# Patient Record
Sex: MALE | Race: WHITE | NOT HISPANIC OR LATINO | Employment: FULL TIME | URBAN - METROPOLITAN AREA
[De-identification: names, ages, dates, MRNs, and addresses within clinical notes are randomized per-mention and may not be internally consistent; named-entity substitution may affect disease eponyms.]

---

## 2015-03-17 LAB — CRC RECOMMENDATION EXT: NORMAL

## 2017-03-23 ENCOUNTER — HISTORICAL (OUTPATIENT)
Dept: ADMINISTRATIVE | Facility: HOSPITAL | Age: 67
End: 2017-03-23

## 2017-07-06 ENCOUNTER — HISTORICAL (OUTPATIENT)
Dept: RADIOLOGY | Facility: HOSPITAL | Age: 67
End: 2017-07-06

## 2017-07-06 LAB
ABS NEUT (OLG): 4.13 X10(3)/MCL (ref 2.1–9.2)
ALBUMIN SERPL-MCNC: 3.8 GM/DL (ref 3.4–5)
ALBUMIN/GLOB SERPL: 1.3 RATIO (ref 1.1–2)
ALP SERPL-CCNC: 29 UNIT/L (ref 50–136)
ALT SERPL-CCNC: 30 UNIT/L (ref 12–78)
AMYLASE SERPL-CCNC: 42 UNIT/L (ref 25–115)
AST SERPL-CCNC: 22 UNIT/L (ref 15–37)
BASOPHILS # BLD AUTO: 0 X10(3)/MCL (ref 0–0.2)
BASOPHILS NFR BLD AUTO: 0 %
BILIRUB SERPL-MCNC: 0.4 MG/DL (ref 0.2–1)
BILIRUBIN DIRECT+TOT PNL SERPL-MCNC: 0.1 MG/DL (ref 0–0.5)
BILIRUBIN DIRECT+TOT PNL SERPL-MCNC: 0.3 MG/DL (ref 0–0.8)
BUN SERPL-MCNC: 19 MG/DL (ref 7–18)
CALCIUM SERPL-MCNC: 9.2 MG/DL (ref 8.5–10.1)
CHLORIDE SERPL-SCNC: 105 MMOL/L (ref 98–107)
CO2 SERPL-SCNC: 29 MMOL/L (ref 21–32)
CREAT SERPL-MCNC: 0.84 MG/DL (ref 0.7–1.3)
EOSINOPHIL # BLD AUTO: 0.3 X10(3)/MCL (ref 0–0.9)
EOSINOPHIL NFR BLD AUTO: 4 %
ERYTHROCYTE [DISTWIDTH] IN BLOOD BY AUTOMATED COUNT: 13.2 % (ref 11.5–17)
GLOBULIN SER-MCNC: 3 GM/DL (ref 2.4–3.5)
GLUCOSE SERPL-MCNC: 122 MG/DL (ref 74–106)
HCT VFR BLD AUTO: 39.3 % (ref 42–52)
HGB BLD-MCNC: 12.8 GM/DL (ref 14–18)
LIPASE SERPL-CCNC: 159 UNIT/L (ref 73–393)
LYMPHOCYTES # BLD AUTO: 1.4 X10(3)/MCL (ref 0.6–4.6)
LYMPHOCYTES NFR BLD AUTO: 22 %
MCH RBC QN AUTO: 29.3 PG (ref 27–31)
MCHC RBC AUTO-ENTMCNC: 32.6 GM/DL (ref 33–36)
MCV RBC AUTO: 89.9 FL (ref 80–94)
MONOCYTES # BLD AUTO: 0.4 X10(3)/MCL (ref 0.1–1.3)
MONOCYTES NFR BLD AUTO: 6 %
NEUTROPHILS # BLD AUTO: 4.13 X10(3)/MCL (ref 1.4–7.9)
NEUTROPHILS NFR BLD AUTO: 66 %
PLATELET # BLD AUTO: 162 X10(3)/MCL (ref 130–400)
PMV BLD AUTO: 9.6 FL (ref 9.4–12.4)
POTASSIUM SERPL-SCNC: 4.4 MMOL/L (ref 3.5–5.1)
PROT SERPL-MCNC: 6.8 GM/DL (ref 6.4–8.2)
RBC # BLD AUTO: 4.37 X10(6)/MCL (ref 4.7–6.1)
SODIUM SERPL-SCNC: 142 MMOL/L (ref 136–145)
WBC # SPEC AUTO: 6.2 X10(3)/MCL (ref 4.5–11.5)

## 2017-07-20 ENCOUNTER — HISTORICAL (OUTPATIENT)
Dept: ADMINISTRATIVE | Facility: HOSPITAL | Age: 67
End: 2017-07-20

## 2017-07-20 LAB
APPEARANCE, UA: CLEAR
BACTERIA SPEC CULT: NORMAL /HPF
BILIRUB UR QL STRIP: NEGATIVE
CHOLEST SERPL-MCNC: 133 MG/DL (ref 0–200)
CHOLEST/HDLC SERPL: 4 {RATIO} (ref 0–5)
COLOR UR: YELLOW
CREAT UR-MCNC: 166 MG/DL
EST. AVERAGE GLUCOSE BLD GHB EST-MCNC: 140 MG/DL
GLUCOSE (UA): NEGATIVE
HBA1C MFR BLD: 6.5 % (ref 4.2–6.3)
HDLC SERPL-MCNC: 33 MG/DL (ref 35–60)
HGB UR QL STRIP: NEGATIVE
KETONES UR QL STRIP: NEGATIVE
LDLC SERPL CALC-MCNC: 67 MG/DL (ref 0–129)
LEUKOCYTE ESTERASE UR QL STRIP: NEGATIVE
MICROALBUMIN UR-MCNC: 7.8 MG/DL
MICROALBUMIN/CREAT RATIO PNL UR: 47 MG/GM CR (ref 0–30)
NITRITE UR QL STRIP: NEGATIVE
PH UR STRIP: 5 [PH] (ref 5–9)
PROT UR QL STRIP: NEGATIVE
PSA SERPL-MCNC: 0.63 NG/ML (ref 0–4)
RBC #/AREA URNS HPF: NORMAL /[HPF]
SP GR UR STRIP: 1.02 (ref 1–1.03)
SQUAMOUS EPITHELIAL, UA: NORMAL
TRIGL SERPL-MCNC: 167 MG/DL (ref 30–150)
TSH SERPL-ACNC: 1.1 MIU/ML (ref 0.36–3.74)
URATE SERPL-MCNC: 5.7 MG/DL (ref 2.6–7.2)
UROBILINOGEN UR STRIP-ACNC: 0.2
VLDLC SERPL CALC-MCNC: 33 MG/DL
WBC #/AREA URNS HPF: NORMAL /HPF

## 2017-11-22 ENCOUNTER — HISTORICAL (OUTPATIENT)
Dept: ADMINISTRATIVE | Facility: HOSPITAL | Age: 67
End: 2017-11-22

## 2017-11-22 LAB
ABS NEUT (OLG): 3.87 X10(3)/MCL (ref 2.1–9.2)
ALBUMIN SERPL-MCNC: 3.6 GM/DL (ref 3.4–5)
ALBUMIN/GLOB SERPL: 1.2 RATIO (ref 1.1–2)
ALP SERPL-CCNC: 32 UNIT/L (ref 50–136)
ALT SERPL-CCNC: 31 UNIT/L (ref 12–78)
APPEARANCE, UA: CLEAR
AST SERPL-CCNC: 18 UNIT/L (ref 15–37)
BACTERIA SPEC CULT: ABNORMAL /HPF
BASOPHILS # BLD AUTO: 0 X10(3)/MCL (ref 0–0.2)
BASOPHILS NFR BLD AUTO: 1 %
BILIRUB SERPL-MCNC: 0.5 MG/DL (ref 0.2–1)
BILIRUB UR QL STRIP: NEGATIVE
BILIRUBIN DIRECT+TOT PNL SERPL-MCNC: 0.1 MG/DL (ref 0–0.5)
BILIRUBIN DIRECT+TOT PNL SERPL-MCNC: 0.4 MG/DL (ref 0–0.8)
BUN SERPL-MCNC: 20 MG/DL (ref 7–18)
CALCIUM SERPL-MCNC: 8.9 MG/DL (ref 8.5–10.1)
CHLORIDE SERPL-SCNC: 102 MMOL/L (ref 98–107)
CHOLEST SERPL-MCNC: 133 MG/DL (ref 0–200)
CHOLEST/HDLC SERPL: 4 {RATIO} (ref 0–5)
CO2 SERPL-SCNC: 30 MMOL/L (ref 21–32)
COLOR UR: YELLOW
CREAT SERPL-MCNC: 0.81 MG/DL (ref 0.7–1.3)
CREAT UR-MCNC: 145 MG/DL
EOSINOPHIL # BLD AUTO: 0.3 X10(3)/MCL (ref 0–0.9)
EOSINOPHIL NFR BLD AUTO: 5 %
ERYTHROCYTE [DISTWIDTH] IN BLOOD BY AUTOMATED COUNT: 13.4 % (ref 11.5–17)
EST. AVERAGE GLUCOSE BLD GHB EST-MCNC: 134 MG/DL
GLOBULIN SER-MCNC: 3.1 GM/DL (ref 2.4–3.5)
GLUCOSE (UA): NEGATIVE
GLUCOSE SERPL-MCNC: 139 MG/DL (ref 74–106)
HBA1C MFR BLD: 6.3 % (ref 4.2–6.3)
HCT VFR BLD AUTO: 39.7 % (ref 42–52)
HDLC SERPL-MCNC: 33 MG/DL (ref 35–60)
HGB BLD-MCNC: 12.5 GM/DL (ref 14–18)
HGB UR QL STRIP: NEGATIVE
KETONES UR QL STRIP: NEGATIVE
LDLC SERPL CALC-MCNC: 71 MG/DL (ref 0–129)
LEUKOCYTE ESTERASE UR QL STRIP: NEGATIVE
LYMPHOCYTES # BLD AUTO: 1.4 X10(3)/MCL (ref 0.6–4.6)
LYMPHOCYTES NFR BLD AUTO: 22 %
MCH RBC QN AUTO: 28.5 PG (ref 27–31)
MCHC RBC AUTO-ENTMCNC: 31.5 GM/DL (ref 33–36)
MCV RBC AUTO: 90.4 FL (ref 80–94)
MICROALBUMIN UR-MCNC: 12.2 MG/DL
MICROALBUMIN/CREAT RATIO PNL UR: 84.1 MG/GM CR (ref 0–30)
MONOCYTES # BLD AUTO: 0.5 X10(3)/MCL (ref 0.1–1.3)
MONOCYTES NFR BLD AUTO: 8 %
NEUTROPHILS # BLD AUTO: 3.87 X10(3)/MCL (ref 1.4–7.9)
NEUTROPHILS NFR BLD AUTO: 63 %
NITRITE UR QL STRIP: NEGATIVE
PH UR STRIP: 5.5 [PH] (ref 5–9)
PLATELET # BLD AUTO: 159 X10(3)/MCL (ref 130–400)
PMV BLD AUTO: 9.9 FL (ref 9.4–12.4)
POTASSIUM SERPL-SCNC: 4.4 MMOL/L (ref 3.5–5.1)
PROT SERPL-MCNC: 6.7 GM/DL (ref 6.4–8.2)
PROT UR QL STRIP: ABNORMAL
RBC # BLD AUTO: 4.39 X10(6)/MCL (ref 4.7–6.1)
RBC #/AREA URNS HPF: ABNORMAL /[HPF]
SODIUM SERPL-SCNC: 141 MMOL/L (ref 136–145)
SP GR UR STRIP: 1.02 (ref 1–1.03)
SQUAMOUS EPITHELIAL, UA: ABNORMAL
TRIGL SERPL-MCNC: 144 MG/DL (ref 30–150)
TSH SERPL-ACNC: 0.29 MIU/ML (ref 0.36–3.74)
UA WBC MAN: ABNORMAL
UROBILINOGEN UR STRIP-ACNC: 0.2
VLDLC SERPL CALC-MCNC: 29 MG/DL
WBC # SPEC AUTO: 6.2 X10(3)/MCL (ref 4.5–11.5)

## 2018-02-19 ENCOUNTER — HISTORICAL (OUTPATIENT)
Dept: ADMINISTRATIVE | Facility: HOSPITAL | Age: 68
End: 2018-02-19

## 2018-02-19 LAB
ABS NEUT (OLG): 4.68 X10(3)/MCL (ref 2.1–9.2)
ALBUMIN SERPL-MCNC: 3.8 GM/DL (ref 3.4–5)
ALBUMIN/GLOB SERPL: 1.3 RATIO (ref 1.1–2)
ALP SERPL-CCNC: 32 UNIT/L (ref 50–136)
ALT SERPL-CCNC: 28 UNIT/L (ref 12–78)
APPEARANCE, UA: CLEAR
AST SERPL-CCNC: 22 UNIT/L (ref 15–37)
BACTERIA SPEC CULT: NORMAL /HPF
BASOPHILS # BLD AUTO: 0 X10(3)/MCL (ref 0–0.2)
BASOPHILS NFR BLD AUTO: 1 %
BILIRUB SERPL-MCNC: 0.4 MG/DL (ref 0.2–1)
BILIRUB UR QL STRIP: NEGATIVE
BILIRUBIN DIRECT+TOT PNL SERPL-MCNC: 0.1 MG/DL (ref 0–0.5)
BILIRUBIN DIRECT+TOT PNL SERPL-MCNC: 0.3 MG/DL (ref 0–0.8)
BUN SERPL-MCNC: 22 MG/DL (ref 7–18)
CALCIUM SERPL-MCNC: 8.6 MG/DL (ref 8.5–10.1)
CHLORIDE SERPL-SCNC: 104 MMOL/L (ref 98–107)
CHOLEST SERPL-MCNC: 129 MG/DL (ref 0–200)
CHOLEST/HDLC SERPL: 4.3 {RATIO} (ref 0–5)
CO2 SERPL-SCNC: 30 MMOL/L (ref 21–32)
COLOR UR: YELLOW
CREAT SERPL-MCNC: 0.84 MG/DL (ref 0.7–1.3)
CREAT UR-MCNC: 149 MG/DL
EOSINOPHIL # BLD AUTO: 0.4 X10(3)/MCL (ref 0–0.9)
EOSINOPHIL NFR BLD AUTO: 5 %
ERYTHROCYTE [DISTWIDTH] IN BLOOD BY AUTOMATED COUNT: 13.8 % (ref 11.5–17)
EST. AVERAGE GLUCOSE BLD GHB EST-MCNC: 137 MG/DL
GLOBULIN SER-MCNC: 3 GM/DL (ref 2.4–3.5)
GLUCOSE (UA): NEGATIVE
GLUCOSE SERPL-MCNC: 162 MG/DL (ref 74–106)
HBA1C MFR BLD: 6.4 % (ref 4.2–6.3)
HCT VFR BLD AUTO: 38.3 % (ref 42–52)
HDLC SERPL-MCNC: 30 MG/DL (ref 35–60)
HGB BLD-MCNC: 12.4 GM/DL (ref 14–18)
HGB UR QL STRIP: NEGATIVE
KETONES UR QL STRIP: NEGATIVE
LDLC SERPL CALC-MCNC: 55 MG/DL (ref 0–129)
LEUKOCYTE ESTERASE UR QL STRIP: NEGATIVE
LYMPHOCYTES # BLD AUTO: 1.2 X10(3)/MCL (ref 0.6–4.6)
LYMPHOCYTES NFR BLD AUTO: 18 %
MCH RBC QN AUTO: 29.6 PG (ref 27–31)
MCHC RBC AUTO-ENTMCNC: 32.4 GM/DL (ref 33–36)
MCV RBC AUTO: 91.4 FL (ref 80–94)
MICROALBUMIN UR-MCNC: 8.3 MG/DL
MICROALBUMIN/CREAT RATIO PNL UR: 55.8 MG/GM CR (ref 0–30)
MONOCYTES # BLD AUTO: 0.5 X10(3)/MCL (ref 0.1–1.3)
MONOCYTES NFR BLD AUTO: 7 %
NEUTROPHILS # BLD AUTO: 4.68 X10(3)/MCL (ref 2.1–9.2)
NEUTROPHILS NFR BLD AUTO: 68 %
NITRITE UR QL STRIP: NEGATIVE
PH UR STRIP: 5 [PH] (ref 5–9)
PLATELET # BLD AUTO: 174 X10(3)/MCL (ref 130–400)
PMV BLD AUTO: 10 FL (ref 9.4–12.4)
POTASSIUM SERPL-SCNC: 4.4 MMOL/L (ref 3.5–5.1)
PROT SERPL-MCNC: 6.8 GM/DL (ref 6.4–8.2)
PROT UR QL STRIP: NEGATIVE
RBC # BLD AUTO: 4.19 X10(6)/MCL (ref 4.7–6.1)
RBC #/AREA URNS HPF: NORMAL /[HPF]
SODIUM SERPL-SCNC: 140 MMOL/L (ref 136–145)
SP GR UR STRIP: 1.02 (ref 1–1.03)
SQUAMOUS EPITHELIAL, UA: NORMAL
TRIGL SERPL-MCNC: 220 MG/DL (ref 30–150)
TSH SERPL-ACNC: 0.5 MIU/ML (ref 0.36–3.74)
URATE SERPL-MCNC: 4.4 MG/DL (ref 2.6–7.2)
UROBILINOGEN UR STRIP-ACNC: 0.2
VLDLC SERPL CALC-MCNC: 44 MG/DL
WBC # SPEC AUTO: 6.9 X10(3)/MCL (ref 4.5–11.5)
WBC #/AREA URNS HPF: NORMAL /HPF

## 2018-07-24 ENCOUNTER — HISTORICAL (OUTPATIENT)
Dept: ADMINISTRATIVE | Facility: HOSPITAL | Age: 68
End: 2018-07-24

## 2018-08-02 ENCOUNTER — HISTORICAL (OUTPATIENT)
Dept: ADMINISTRATIVE | Facility: HOSPITAL | Age: 68
End: 2018-08-02

## 2018-08-02 LAB
ABS NEUT (OLG): 4.22 X10(3)/MCL (ref 2.1–9.2)
ALBUMIN SERPL-MCNC: 3.5 GM/DL (ref 3.4–5)
ALBUMIN/GLOB SERPL: 1.1 RATIO (ref 1.1–2)
ALP SERPL-CCNC: 33 UNIT/L (ref 50–136)
ALT SERPL-CCNC: 29 UNIT/L (ref 12–78)
APPEARANCE, UA: CLEAR
AST SERPL-CCNC: 18 UNIT/L (ref 15–37)
BACTERIA SPEC CULT: NORMAL /HPF
BASOPHILS # BLD AUTO: 0 X10(3)/MCL (ref 0–0.2)
BASOPHILS NFR BLD AUTO: 1 %
BILIRUB SERPL-MCNC: 0.2 MG/DL (ref 0.2–1)
BILIRUB UR QL STRIP: NEGATIVE
BILIRUBIN DIRECT+TOT PNL SERPL-MCNC: 0.1 MG/DL (ref 0–0.5)
BILIRUBIN DIRECT+TOT PNL SERPL-MCNC: 0.1 MG/DL (ref 0–0.8)
BUN SERPL-MCNC: 23 MG/DL (ref 7–18)
CALCIUM SERPL-MCNC: 8.7 MG/DL (ref 8.5–10.1)
CHLORIDE SERPL-SCNC: 105 MMOL/L (ref 98–107)
CHOLEST SERPL-MCNC: 136 MG/DL (ref 0–200)
CHOLEST/HDLC SERPL: 4.5 {RATIO} (ref 0–5)
CO2 SERPL-SCNC: 30 MMOL/L (ref 21–32)
COLOR UR: YELLOW
CREAT SERPL-MCNC: 0.94 MG/DL (ref 0.7–1.3)
CREAT UR-MCNC: 157 MG/DL
EOSINOPHIL # BLD AUTO: 0.3 X10(3)/MCL (ref 0–0.9)
EOSINOPHIL NFR BLD AUTO: 5 %
ERYTHROCYTE [DISTWIDTH] IN BLOOD BY AUTOMATED COUNT: 13.8 % (ref 11.5–17)
EST. AVERAGE GLUCOSE BLD GHB EST-MCNC: 131 MG/DL
GLOBULIN SER-MCNC: 3.3 GM/DL (ref 2.4–3.5)
GLUCOSE (UA): NEGATIVE
GLUCOSE SERPL-MCNC: 157 MG/DL (ref 74–106)
HBA1C MFR BLD: 6.2 % (ref 4.2–6.3)
HCT VFR BLD AUTO: 38.5 % (ref 42–52)
HDLC SERPL-MCNC: 30 MG/DL (ref 35–60)
HGB BLD-MCNC: 12 GM/DL (ref 14–18)
HGB UR QL STRIP: NEGATIVE
IRON SATN MFR SERPL: 17.4 % (ref 20–50)
IRON SERPL-MCNC: 55 MCG/DL (ref 50–175)
KETONES UR QL STRIP: NEGATIVE
LDLC SERPL CALC-MCNC: 69 MG/DL (ref 0–129)
LEUKOCYTE ESTERASE UR QL STRIP: NEGATIVE
LYMPHOCYTES # BLD AUTO: 1.1 X10(3)/MCL (ref 0.6–4.6)
LYMPHOCYTES NFR BLD AUTO: 17 %
MCH RBC QN AUTO: 28.8 PG (ref 27–31)
MCHC RBC AUTO-ENTMCNC: 31.2 GM/DL (ref 33–36)
MCV RBC AUTO: 92.5 FL (ref 80–94)
MICROALBUMIN UR-MCNC: 8.2 MG/DL
MICROALBUMIN/CREAT RATIO PNL UR: 52.2 MG/GM CR (ref 0–30)
MONOCYTES # BLD AUTO: 0.5 X10(3)/MCL (ref 0.1–1.3)
MONOCYTES NFR BLD AUTO: 9 %
NEUTROPHILS # BLD AUTO: 4.22 X10(3)/MCL (ref 1.4–7.9)
NEUTROPHILS NFR BLD AUTO: 67 %
NITRITE UR QL STRIP: NEGATIVE
PH UR STRIP: 5 [PH] (ref 5–9)
PLATELET # BLD AUTO: 159 X10(3)/MCL (ref 130–400)
PMV BLD AUTO: 10.1 FL (ref 9.4–12.4)
POTASSIUM SERPL-SCNC: 4.3 MMOL/L (ref 3.5–5.1)
PROT SERPL-MCNC: 6.8 GM/DL (ref 6.4–8.2)
PROT UR QL STRIP: NEGATIVE
PSA SERPL-MCNC: 1 NG/ML (ref 0–4)
RBC # BLD AUTO: 4.16 X10(6)/MCL (ref 4.7–6.1)
RBC #/AREA URNS HPF: NORMAL /[HPF]
SODIUM SERPL-SCNC: 140 MMOL/L (ref 136–145)
SP GR UR STRIP: 1.02 (ref 1–1.03)
SQUAMOUS EPITHELIAL, UA: NORMAL
TIBC SERPL-MCNC: 316 MCG/DL (ref 250–450)
TRANSFERRIN SERPL-MCNC: 263 MG/DL (ref 200–360)
TRIGL SERPL-MCNC: 184 MG/DL (ref 30–150)
TSH SERPL-ACNC: 1.3 MIU/L (ref 0.36–3.74)
UA WBC MAN: NORMAL
URATE SERPL-MCNC: 5.1 MG/DL (ref 2.6–7.2)
UROBILINOGEN UR STRIP-ACNC: 0.2
VLDLC SERPL CALC-MCNC: 37 MG/DL
WBC # SPEC AUTO: 6.3 X10(3)/MCL (ref 4.5–11.5)

## 2018-08-03 ENCOUNTER — HISTORICAL (OUTPATIENT)
Dept: RADIOLOGY | Facility: HOSPITAL | Age: 68
End: 2018-08-03

## 2019-02-06 ENCOUNTER — HISTORICAL (OUTPATIENT)
Dept: ADMINISTRATIVE | Facility: HOSPITAL | Age: 69
End: 2019-02-06

## 2019-02-06 LAB
ABS NEUT (OLG): 4.08 X10(3)/MCL (ref 2.1–9.2)
ALBUMIN SERPL-MCNC: 3.8 GM/DL (ref 3.4–5)
ALBUMIN/GLOB SERPL: 1.3 {RATIO}
ALP SERPL-CCNC: 33 UNIT/L (ref 50–136)
ALT SERPL-CCNC: 28 UNIT/L (ref 12–78)
APPEARANCE, UA: CLEAR
AST SERPL-CCNC: 18 UNIT/L (ref 15–37)
BACTERIA SPEC CULT: ABNORMAL /HPF
BASOPHILS # BLD AUTO: 0 X10(3)/MCL (ref 0–0.2)
BASOPHILS NFR BLD AUTO: 1 %
BILIRUB SERPL-MCNC: 0.4 MG/DL (ref 0.2–1)
BILIRUB UR QL STRIP: NEGATIVE
BILIRUBIN DIRECT+TOT PNL SERPL-MCNC: 0.1 MG/DL (ref 0–0.2)
BILIRUBIN DIRECT+TOT PNL SERPL-MCNC: 0.3 MG/DL (ref 0–0.8)
BUN SERPL-MCNC: 15 MG/DL (ref 7–18)
CALCIUM SERPL-MCNC: 8.7 MG/DL (ref 8.5–10.1)
CHLORIDE SERPL-SCNC: 101 MMOL/L (ref 98–107)
CHOLEST SERPL-MCNC: 121 MG/DL (ref 0–200)
CHOLEST/HDLC SERPL: 3.8 {RATIO} (ref 0–5)
CO2 SERPL-SCNC: 30 MMOL/L (ref 21–32)
COLOR UR: YELLOW
CREAT SERPL-MCNC: 0.91 MG/DL (ref 0.7–1.3)
CREAT UR-MCNC: 205 MG/DL
EOSINOPHIL # BLD AUTO: 0.3 X10(3)/MCL (ref 0–0.9)
EOSINOPHIL NFR BLD AUTO: 4 %
ERYTHROCYTE [DISTWIDTH] IN BLOOD BY AUTOMATED COUNT: 13.5 % (ref 11.5–17)
EST. AVERAGE GLUCOSE BLD GHB EST-MCNC: 151 MG/DL
GLOBULIN SER-MCNC: 2.9 GM/DL (ref 2.4–3.5)
GLUCOSE (UA): NEGATIVE
GLUCOSE SERPL-MCNC: 149 MG/DL (ref 74–106)
HBA1C MFR BLD: 6.9 % (ref 4.2–6.3)
HCT VFR BLD AUTO: 39.5 % (ref 42–52)
HDLC SERPL-MCNC: 32 MG/DL (ref 35–60)
HGB BLD-MCNC: 12.4 GM/DL (ref 14–18)
HGB UR QL STRIP: NEGATIVE
KETONES UR QL STRIP: NEGATIVE
LDLC SERPL CALC-MCNC: 67 MG/DL (ref 0–129)
LEUKOCYTE ESTERASE UR QL STRIP: NEGATIVE
LYMPHOCYTES # BLD AUTO: 1.2 X10(3)/MCL (ref 0.6–4.6)
LYMPHOCYTES NFR BLD AUTO: 20 %
MCH RBC QN AUTO: 28.8 PG (ref 27–31)
MCHC RBC AUTO-ENTMCNC: 31.4 GM/DL (ref 33–36)
MCV RBC AUTO: 91.9 FL (ref 80–94)
MICROALBUMIN UR-MCNC: 31.3 MG/DL
MICROALBUMIN/CREAT RATIO PNL UR: 152.7 MG/GM CR (ref 0–30)
MONOCYTES # BLD AUTO: 0.4 X10(3)/MCL (ref 0.1–1.3)
MONOCYTES NFR BLD AUTO: 7 %
NEUTROPHILS # BLD AUTO: 4.08 X10(3)/MCL (ref 2.1–9.2)
NEUTROPHILS NFR BLD AUTO: 68 %
NITRITE UR QL STRIP: NEGATIVE
PH UR STRIP: 5.5 [PH] (ref 5–9)
PLATELET # BLD AUTO: 177 X10(3)/MCL (ref 130–400)
PMV BLD AUTO: 9.8 FL (ref 9.4–12.4)
POTASSIUM SERPL-SCNC: 4.2 MMOL/L (ref 3.5–5.1)
PROT SERPL-MCNC: 6.7 GM/DL (ref 6.4–8.2)
PROT UR QL STRIP: ABNORMAL
RBC # BLD AUTO: 4.3 X10(6)/MCL (ref 4.7–6.1)
RBC #/AREA URNS HPF: ABNORMAL /[HPF]
SODIUM SERPL-SCNC: 139 MMOL/L (ref 136–145)
SP GR UR STRIP: 1.02 (ref 1–1.03)
SQUAMOUS EPITHELIAL, UA: ABNORMAL
TRIGL SERPL-MCNC: 111 MG/DL (ref 30–150)
TSH SERPL-ACNC: 2.61 MIU/L (ref 0.36–3.74)
URATE SERPL-MCNC: 4.7 MG/DL (ref 2.6–7.2)
UROBILINOGEN UR STRIP-ACNC: 0.2
VLDLC SERPL CALC-MCNC: 22 MG/DL
WBC # SPEC AUTO: 6 X10(3)/MCL (ref 4.5–11.5)
WBC #/AREA URNS HPF: ABNORMAL /HPF

## 2019-08-08 ENCOUNTER — HISTORICAL (OUTPATIENT)
Dept: ADMINISTRATIVE | Facility: HOSPITAL | Age: 69
End: 2019-08-08

## 2019-08-08 LAB
ABS NEUT (OLG): 3.8 X10(3)/MCL (ref 2.1–9.2)
ALBUMIN SERPL-MCNC: 3.7 GM/DL (ref 3.4–5)
ALBUMIN/GLOB SERPL: 1.3 {RATIO}
ALP SERPL-CCNC: 30 UNIT/L (ref 50–136)
ALT SERPL-CCNC: 25 UNIT/L (ref 12–78)
APPEARANCE, UA: CLEAR
AST SERPL-CCNC: 15 UNIT/L (ref 15–37)
BACTERIA SPEC CULT: NORMAL /HPF
BASOPHILS # BLD AUTO: 0.1 X10(3)/MCL (ref 0–0.2)
BASOPHILS NFR BLD AUTO: 1 %
BILIRUB SERPL-MCNC: 0.6 MG/DL (ref 0.2–1)
BILIRUB UR QL STRIP: NEGATIVE
BILIRUBIN DIRECT+TOT PNL SERPL-MCNC: 0.2 MG/DL (ref 0–0.2)
BILIRUBIN DIRECT+TOT PNL SERPL-MCNC: 0.4 MG/DL (ref 0–0.8)
BUN SERPL-MCNC: 15 MG/DL (ref 7–18)
CALCIUM SERPL-MCNC: 8.7 MG/DL (ref 8.5–10.1)
CHLORIDE SERPL-SCNC: 104 MMOL/L (ref 98–107)
CHOLEST SERPL-MCNC: 128 MG/DL (ref 0–200)
CHOLEST/HDLC SERPL: 3.9 {RATIO} (ref 0–5)
CO2 SERPL-SCNC: 29 MMOL/L (ref 21–32)
COLOR UR: YELLOW
CREAT SERPL-MCNC: 0.91 MG/DL (ref 0.7–1.3)
CREAT UR-MCNC: 183 MG/DL
EOSINOPHIL # BLD AUTO: 0.3 X10(3)/MCL (ref 0–0.9)
EOSINOPHIL NFR BLD AUTO: 5 %
ERYTHROCYTE [DISTWIDTH] IN BLOOD BY AUTOMATED COUNT: 14 % (ref 11.5–17)
EST. AVERAGE GLUCOSE BLD GHB EST-MCNC: 148 MG/DL
GLOBULIN SER-MCNC: 2.8 GM/DL (ref 2.4–3.5)
GLUCOSE (UA): NEGATIVE
GLUCOSE SERPL-MCNC: 151 MG/DL (ref 74–106)
HBA1C MFR BLD: 6.8 % (ref 4.2–6.3)
HCT VFR BLD AUTO: 39.4 % (ref 42–52)
HDLC SERPL-MCNC: 33 MG/DL (ref 35–60)
HGB BLD-MCNC: 12.6 GM/DL (ref 14–18)
HGB UR QL STRIP: NEGATIVE
KETONES UR QL STRIP: NEGATIVE
LDLC SERPL CALC-MCNC: 66 MG/DL (ref 0–129)
LEUKOCYTE ESTERASE UR QL STRIP: NEGATIVE
LYMPHOCYTES # BLD AUTO: 1.2 X10(3)/MCL (ref 0.6–4.6)
LYMPHOCYTES NFR BLD AUTO: 21 %
MCH RBC QN AUTO: 29.6 PG (ref 27–31)
MCHC RBC AUTO-ENTMCNC: 32 GM/DL (ref 33–36)
MCV RBC AUTO: 92.7 FL (ref 80–94)
MICROALBUMIN UR-MCNC: 13.8 MG/DL
MICROALBUMIN/CREAT RATIO PNL UR: 75.4 MG/GM CR (ref 0–30)
MONOCYTES # BLD AUTO: 0.4 X10(3)/MCL (ref 0.1–1.3)
MONOCYTES NFR BLD AUTO: 7 %
NEUTROPHILS # BLD AUTO: 3.8 X10(3)/MCL (ref 2.1–9.2)
NEUTROPHILS NFR BLD AUTO: 65 %
NITRITE UR QL STRIP: NEGATIVE
PH UR STRIP: 5 [PH] (ref 5–9)
PLATELET # BLD AUTO: 168 X10(3)/MCL (ref 130–400)
PMV BLD AUTO: 9.9 FL (ref 9.4–12.4)
POTASSIUM SERPL-SCNC: 4.1 MMOL/L (ref 3.5–5.1)
PROT SERPL-MCNC: 6.5 GM/DL (ref 6.4–8.2)
PROT UR QL STRIP: NEGATIVE
PSA SERPL-MCNC: 0.78 NG/ML (ref 0–4)
RBC # BLD AUTO: 4.25 X10(6)/MCL (ref 4.7–6.1)
RBC #/AREA URNS HPF: NORMAL /[HPF]
SODIUM SERPL-SCNC: 141 MMOL/L (ref 136–145)
SP GR UR STRIP: 1.02 (ref 1–1.03)
SQUAMOUS EPITHELIAL, UA: NORMAL
TRIGL SERPL-MCNC: 145 MG/DL (ref 30–150)
TSH SERPL-ACNC: 2.05 MIU/L (ref 0.36–3.74)
URATE SERPL-MCNC: 5.1 MG/DL (ref 2.6–7.2)
UROBILINOGEN UR STRIP-ACNC: 0.2
VLDLC SERPL CALC-MCNC: 29 MG/DL
WBC # SPEC AUTO: 5.9 X10(3)/MCL (ref 4.5–11.5)
WBC #/AREA URNS HPF: NORMAL /HPF

## 2020-02-10 ENCOUNTER — HISTORICAL (OUTPATIENT)
Dept: ADMINISTRATIVE | Facility: HOSPITAL | Age: 70
End: 2020-02-10

## 2020-02-10 LAB
ABS NEUT (OLG): 4.32 X10(3)/MCL (ref 2.1–9.2)
ALBUMIN SERPL-MCNC: 3.6 GM/DL (ref 3.4–5)
ALBUMIN/GLOB SERPL: 1.2 RATIO (ref 1.1–2)
ALP SERPL-CCNC: 31 UNIT/L (ref 50–136)
ALT SERPL-CCNC: 26 UNIT/L (ref 12–78)
APPEARANCE, UA: CLEAR
AST SERPL-CCNC: 19 UNIT/L (ref 15–37)
BACTERIA SPEC CULT: ABNORMAL /HPF
BASOPHILS # BLD AUTO: 0 X10(3)/MCL (ref 0–0.2)
BASOPHILS NFR BLD AUTO: 1 %
BILIRUB SERPL-MCNC: 0.4 MG/DL (ref 0.2–1)
BILIRUB UR QL STRIP: NEGATIVE
BILIRUBIN DIRECT+TOT PNL SERPL-MCNC: 0.1 MG/DL (ref 0–0.5)
BILIRUBIN DIRECT+TOT PNL SERPL-MCNC: 0.3 MG/DL (ref 0–0.8)
BUN SERPL-MCNC: 16 MG/DL (ref 7–18)
CALCIUM SERPL-MCNC: 9 MG/DL (ref 8.5–10.1)
CHLORIDE SERPL-SCNC: 105 MMOL/L (ref 98–107)
CHOLEST SERPL-MCNC: 137 MG/DL (ref 0–200)
CHOLEST/HDLC SERPL: 4.7 {RATIO} (ref 0–5)
CO2 SERPL-SCNC: 30 MMOL/L (ref 21–32)
COLOR UR: YELLOW
CREAT SERPL-MCNC: 0.92 MG/DL (ref 0.7–1.3)
CREAT UR-MCNC: 201 MG/DL
EOSINOPHIL # BLD AUTO: 0.3 X10(3)/MCL (ref 0–0.9)
EOSINOPHIL NFR BLD AUTO: 4 %
ERYTHROCYTE [DISTWIDTH] IN BLOOD BY AUTOMATED COUNT: 13.5 % (ref 11.5–17)
EST. AVERAGE GLUCOSE BLD GHB EST-MCNC: 146 MG/DL
GLOBULIN SER-MCNC: 3.1 GM/DL (ref 2.4–3.5)
GLUCOSE (UA): NEGATIVE
GLUCOSE SERPL-MCNC: 161 MG/DL (ref 74–106)
HBA1C MFR BLD: 6.7 % (ref 4.2–6.3)
HCT VFR BLD AUTO: 40.5 % (ref 42–52)
HDLC SERPL-MCNC: 29 MG/DL (ref 35–60)
HGB BLD-MCNC: 12.9 GM/DL (ref 14–18)
HGB UR QL STRIP: NEGATIVE
KETONES UR QL STRIP: NEGATIVE
LDLC SERPL CALC-MCNC: 75 MG/DL (ref 0–129)
LEUKOCYTE ESTERASE UR QL STRIP: NEGATIVE
LYMPHOCYTES # BLD AUTO: 1.3 X10(3)/MCL (ref 0.6–4.6)
LYMPHOCYTES NFR BLD AUTO: 21 %
MCH RBC QN AUTO: 29.2 PG (ref 27–31)
MCHC RBC AUTO-ENTMCNC: 31.9 GM/DL (ref 33–36)
MCV RBC AUTO: 91.6 FL (ref 80–94)
MICROALBUMIN UR-MCNC: 15.8 MG/DL
MICROALBUMIN/CREAT RATIO PNL UR: 78.6 MG/GM CR (ref 0–30)
MONOCYTES # BLD AUTO: 0.5 X10(3)/MCL (ref 0.1–1.3)
MONOCYTES NFR BLD AUTO: 7 %
NEUTROPHILS # BLD AUTO: 4.32 X10(3)/MCL (ref 2.1–9.2)
NEUTROPHILS NFR BLD AUTO: 66 %
NITRITE UR QL STRIP: NEGATIVE
PH UR STRIP: 5 [PH] (ref 5–9)
PLATELET # BLD AUTO: 155 X10(3)/MCL (ref 130–400)
PMV BLD AUTO: 10.1 FL (ref 9.4–12.4)
POTASSIUM SERPL-SCNC: 4.7 MMOL/L (ref 3.5–5.1)
PROT SERPL-MCNC: 6.7 GM/DL (ref 6.4–8.2)
PROT UR QL STRIP: ABNORMAL
RBC # BLD AUTO: 4.42 X10(6)/MCL (ref 4.7–6.1)
RBC #/AREA URNS HPF: ABNORMAL /[HPF]
SODIUM SERPL-SCNC: 141 MMOL/L (ref 136–145)
SP GR UR STRIP: 1.02 (ref 1–1.03)
SQUAMOUS EPITHELIAL, UA: ABNORMAL
TRIGL SERPL-MCNC: 164 MG/DL (ref 30–150)
TSH SERPL-ACNC: 1.99 MIU/L (ref 0.36–3.74)
URATE SERPL-MCNC: 5.4 MG/DL (ref 2.6–7.2)
UROBILINOGEN UR STRIP-ACNC: 0.2
VLDLC SERPL CALC-MCNC: 33 MG/DL
WBC # SPEC AUTO: 6.5 X10(3)/MCL (ref 4.5–11.5)
WBC #/AREA URNS HPF: ABNORMAL /HPF

## 2020-07-27 ENCOUNTER — HISTORICAL (OUTPATIENT)
Dept: ENDOSCOPY | Facility: HOSPITAL | Age: 70
End: 2020-07-27

## 2020-07-27 LAB — CRC RECOMMENDATION EXT: NORMAL

## 2020-08-13 ENCOUNTER — HISTORICAL (OUTPATIENT)
Dept: ADMINISTRATIVE | Facility: HOSPITAL | Age: 70
End: 2020-08-13

## 2020-08-13 LAB
ABS NEUT (OLG): 3.61 X10(3)/MCL (ref 2.1–9.2)
ALBUMIN SERPL-MCNC: 3.8 GM/DL (ref 3.4–4.8)
ALBUMIN/GLOB SERPL: 1.5 RATIO (ref 1.1–2)
ALP SERPL-CCNC: 30 UNIT/L (ref 40–150)
ALT SERPL-CCNC: 22 UNIT/L (ref 0–55)
APPEARANCE, UA: CLEAR
AST SERPL-CCNC: 21 UNIT/L (ref 5–34)
BACTERIA SPEC CULT: ABNORMAL /HPF
BASOPHILS # BLD AUTO: 0 X10(3)/MCL (ref 0–0.2)
BASOPHILS NFR BLD AUTO: 1 %
BILIRUB SERPL-MCNC: 0.5 MG/DL
BILIRUB UR QL STRIP: NEGATIVE
BILIRUBIN DIRECT+TOT PNL SERPL-MCNC: 0.2 MG/DL (ref 0–0.5)
BILIRUBIN DIRECT+TOT PNL SERPL-MCNC: 0.3 MG/DL (ref 0–0.8)
BUN SERPL-MCNC: 15.2 MG/DL (ref 8.4–25.7)
CALCIUM SERPL-MCNC: 9 MG/DL (ref 8.8–10)
CHLORIDE SERPL-SCNC: 105 MMOL/L (ref 98–107)
CHOLEST SERPL-MCNC: 124 MG/DL
CHOLEST/HDLC SERPL: 4 {RATIO} (ref 0–5)
CO2 SERPL-SCNC: 29 MMOL/L (ref 23–31)
COLOR UR: YELLOW
CREAT SERPL-MCNC: 0.87 MG/DL (ref 0.73–1.18)
CREAT UR-MCNC: 177.6 MG/DL (ref 58–161)
EOSINOPHIL # BLD AUTO: 0.3 X10(3)/MCL (ref 0–0.9)
EOSINOPHIL NFR BLD AUTO: 6 %
ERYTHROCYTE [DISTWIDTH] IN BLOOD BY AUTOMATED COUNT: 13.3 % (ref 11.5–17)
EST. AVERAGE GLUCOSE BLD GHB EST-MCNC: 125.5 MG/DL
GLOBULIN SER-MCNC: 2.6 GM/DL (ref 2.4–3.5)
GLUCOSE (UA): NEGATIVE
GLUCOSE SERPL-MCNC: 143 MG/DL (ref 82–115)
HBA1C MFR BLD: 6 %
HCT VFR BLD AUTO: 39.7 % (ref 42–52)
HDLC SERPL-MCNC: 28 MG/DL (ref 35–60)
HGB BLD-MCNC: 12.2 GM/DL (ref 14–18)
HGB UR QL STRIP: NEGATIVE
KETONES UR QL STRIP: NEGATIVE
LDLC SERPL CALC-MCNC: 70 MG/DL (ref 50–140)
LEUKOCYTE ESTERASE UR QL STRIP: NEGATIVE
LYMPHOCYTES # BLD AUTO: 1.2 X10(3)/MCL (ref 0.6–4.6)
LYMPHOCYTES NFR BLD AUTO: 21 %
MCH RBC QN AUTO: 28.8 PG (ref 27–31)
MCHC RBC AUTO-ENTMCNC: 30.7 GM/DL (ref 33–36)
MCV RBC AUTO: 93.9 FL (ref 80–94)
MICROALBUMIN UR-MCNC: 74.5 UG/ML
MICROALBUMIN/CREAT RATIO PNL UR: 41.9 MG/GM CR (ref 0–30)
MONOCYTES # BLD AUTO: 0.4 X10(3)/MCL (ref 0.1–1.3)
MONOCYTES NFR BLD AUTO: 7 %
NEUTROPHILS # BLD AUTO: 3.61 X10(3)/MCL (ref 2.1–9.2)
NEUTROPHILS NFR BLD AUTO: 65 %
NITRITE UR QL STRIP: NEGATIVE
PH UR STRIP: 5 [PH] (ref 5–9)
PLATELET # BLD AUTO: 168 X10(3)/MCL (ref 130–400)
PMV BLD AUTO: 10.2 FL (ref 9.4–12.4)
POTASSIUM SERPL-SCNC: 4.5 MMOL/L (ref 3.5–5.1)
PROT SERPL-MCNC: 6.4 GM/DL (ref 5.8–7.6)
PROT UR QL STRIP: ABNORMAL
PSA SERPL-MCNC: 0.65 NG/ML
RBC # BLD AUTO: 4.23 X10(6)/MCL (ref 4.7–6.1)
RBC #/AREA URNS HPF: ABNORMAL /[HPF]
SODIUM SERPL-SCNC: 142 MMOL/L (ref 136–145)
SP GR UR STRIP: 1.02 (ref 1–1.03)
SQUAMOUS EPITHELIAL, UA: ABNORMAL
TRIGL SERPL-MCNC: 128 MG/DL (ref 34–140)
TSH SERPL-ACNC: 1.03 UIU/ML (ref 0.35–4.94)
URATE SERPL-MCNC: 5.1 MG/DL (ref 3.8–7)
UROBILINOGEN UR STRIP-ACNC: 0.2
VLDLC SERPL CALC-MCNC: 26 MG/DL
WBC # SPEC AUTO: 5.6 X10(3)/MCL (ref 4.5–11.5)
WBC #/AREA URNS HPF: ABNORMAL /HPF

## 2021-02-17 ENCOUNTER — HISTORICAL (OUTPATIENT)
Dept: ADMINISTRATIVE | Facility: HOSPITAL | Age: 71
End: 2021-02-17

## 2021-02-17 LAB
ABS NEUT (OLG): 3.66 X10(3)/MCL (ref 2.1–9.2)
ALBUMIN SERPL-MCNC: 3.9 GM/DL (ref 3.4–4.8)
ALBUMIN/GLOB SERPL: 1.6 RATIO (ref 1.1–2)
ALP SERPL-CCNC: 34 UNIT/L (ref 40–150)
ALT SERPL-CCNC: 13 UNIT/L (ref 0–55)
APPEARANCE, UA: CLEAR
AST SERPL-CCNC: 17 UNIT/L (ref 5–34)
BACTERIA SPEC CULT: NORMAL /HPF
BASOPHILS # BLD AUTO: 0 X10(3)/MCL (ref 0–0.2)
BASOPHILS NFR BLD AUTO: 1 %
BILIRUB SERPL-MCNC: 0.4 MG/DL
BILIRUB UR QL STRIP: NEGATIVE
BILIRUBIN DIRECT+TOT PNL SERPL-MCNC: 0.2 MG/DL (ref 0–0.5)
BILIRUBIN DIRECT+TOT PNL SERPL-MCNC: 0.2 MG/DL (ref 0–0.8)
BUN SERPL-MCNC: 16.7 MG/DL (ref 8.4–25.7)
CALCIUM SERPL-MCNC: 9.2 MG/DL (ref 8.8–10)
CHLORIDE SERPL-SCNC: 104 MMOL/L (ref 98–107)
CHOLEST SERPL-MCNC: 153 MG/DL
CHOLEST/HDLC SERPL: 4 {RATIO} (ref 0–5)
CO2 SERPL-SCNC: 28 MMOL/L (ref 23–31)
COLOR UR: YELLOW
CREAT SERPL-MCNC: 0.99 MG/DL (ref 0.73–1.18)
CREAT UR-MCNC: 151.2 MG/DL (ref 58–161)
EOSINOPHIL # BLD AUTO: 0.3 X10(3)/MCL (ref 0–0.9)
EOSINOPHIL NFR BLD AUTO: 6 %
ERYTHROCYTE [DISTWIDTH] IN BLOOD BY AUTOMATED COUNT: 14.2 % (ref 11.5–17)
EST. AVERAGE GLUCOSE BLD GHB EST-MCNC: 99.7 MG/DL
GLOBULIN SER-MCNC: 2.5 GM/DL (ref 2.4–3.5)
GLUCOSE (UA): NEGATIVE
GLUCOSE SERPL-MCNC: 116 MG/DL (ref 82–115)
HBA1C MFR BLD: 5.1 %
HCT VFR BLD AUTO: 40.1 % (ref 42–52)
HDLC SERPL-MCNC: 36 MG/DL (ref 35–60)
HGB BLD-MCNC: 12.5 GM/DL (ref 14–18)
HGB UR QL STRIP: NEGATIVE
KETONES UR QL STRIP: NEGATIVE
LDLC SERPL CALC-MCNC: 96 MG/DL (ref 50–140)
LEUKOCYTE ESTERASE UR QL STRIP: NEGATIVE
LYMPHOCYTES # BLD AUTO: 1.3 X10(3)/MCL (ref 0.6–4.6)
LYMPHOCYTES NFR BLD AUTO: 23 %
MCH RBC QN AUTO: 29.7 PG (ref 27–31)
MCHC RBC AUTO-ENTMCNC: 31.2 GM/DL (ref 33–36)
MCV RBC AUTO: 95.2 FL (ref 80–94)
MICROALBUMIN UR-MCNC: 28.1 UG/ML
MICROALBUMIN/CREAT RATIO PNL UR: 18.6 MG/GM CR (ref 0–30)
MONOCYTES # BLD AUTO: 0.4 X10(3)/MCL (ref 0.1–1.3)
MONOCYTES NFR BLD AUTO: 7 %
NEUTROPHILS # BLD AUTO: 3.66 X10(3)/MCL (ref 2.1–9.2)
NEUTROPHILS NFR BLD AUTO: 63 %
NITRITE UR QL STRIP: NEGATIVE
PH UR STRIP: 5 [PH] (ref 5–9)
PLATELET # BLD AUTO: 180 X10(3)/MCL (ref 130–400)
PMV BLD AUTO: 10.9 FL (ref 9.4–12.4)
POTASSIUM SERPL-SCNC: 4.5 MMOL/L (ref 3.5–5.1)
PROT SERPL-MCNC: 6.4 GM/DL (ref 5.8–7.6)
PROT UR QL STRIP: NEGATIVE
RBC # BLD AUTO: 4.21 X10(6)/MCL (ref 4.7–6.1)
RBC #/AREA URNS HPF: NORMAL /[HPF]
SODIUM SERPL-SCNC: 141 MMOL/L (ref 136–145)
SP GR UR STRIP: 1.02 (ref 1–1.03)
SQUAMOUS EPITHELIAL, UA: NORMAL
TRIGL SERPL-MCNC: 105 MG/DL (ref 34–140)
TSH SERPL-ACNC: 8.46 UIU/ML (ref 0.35–4.94)
URATE SERPL-MCNC: 4.5 MG/DL (ref 3.5–7.2)
UROBILINOGEN UR STRIP-ACNC: 0.2
VLDLC SERPL CALC-MCNC: 21 MG/DL
WBC # SPEC AUTO: 5.8 X10(3)/MCL (ref 4.5–11.5)
WBC #/AREA URNS HPF: NORMAL /HPF

## 2021-08-19 ENCOUNTER — HISTORICAL (OUTPATIENT)
Dept: ADMINISTRATIVE | Facility: HOSPITAL | Age: 71
End: 2021-08-19

## 2021-08-19 LAB
ABS NEUT (OLG): 2.99 X10(3)/MCL (ref 2.1–9.2)
ALBUMIN SERPL-MCNC: 4.1 GM/DL (ref 3.4–4.8)
ALBUMIN/GLOB SERPL: 1.8 RATIO (ref 1.1–2)
ALP SERPL-CCNC: 28 UNIT/L (ref 40–150)
ALT SERPL-CCNC: 20 UNIT/L (ref 0–55)
APPEARANCE, UA: CLEAR
AST SERPL-CCNC: 26 UNIT/L (ref 5–34)
BACTERIA SPEC CULT: NORMAL /HPF
BASOPHILS # BLD AUTO: 0 X10(3)/MCL (ref 0–0.2)
BASOPHILS NFR BLD AUTO: 1 %
BILIRUB SERPL-MCNC: 0.6 MG/DL
BILIRUB UR QL STRIP: NEGATIVE
BILIRUBIN DIRECT+TOT PNL SERPL-MCNC: 0.2 MG/DL (ref 0–0.5)
BILIRUBIN DIRECT+TOT PNL SERPL-MCNC: 0.4 MG/DL (ref 0–0.8)
BUN SERPL-MCNC: 18.1 MG/DL (ref 8.4–25.7)
CALCIUM SERPL-MCNC: 9.7 MG/DL (ref 8.8–10)
CHLORIDE SERPL-SCNC: 105 MMOL/L (ref 98–107)
CHOLEST SERPL-MCNC: 120 MG/DL
CHOLEST/HDLC SERPL: 3 {RATIO} (ref 0–5)
CO2 SERPL-SCNC: 26 MMOL/L (ref 23–31)
COLOR UR: YELLOW
CREAT SERPL-MCNC: 0.85 MG/DL (ref 0.73–1.18)
CREAT UR-MCNC: 70.7 MG/DL (ref 58–161)
EOSINOPHIL # BLD AUTO: 0.2 X10(3)/MCL (ref 0–0.9)
EOSINOPHIL NFR BLD AUTO: 5 %
ERYTHROCYTE [DISTWIDTH] IN BLOOD BY AUTOMATED COUNT: 13.5 % (ref 11.5–17)
EST. AVERAGE GLUCOSE BLD GHB EST-MCNC: 79.6 MG/DL
GLOBULIN SER-MCNC: 2.3 GM/DL (ref 2.4–3.5)
GLUCOSE (UA): NEGATIVE
GLUCOSE SERPL-MCNC: 83 MG/DL (ref 82–115)
HBA1C MFR BLD: 4.4 %
HCT VFR BLD AUTO: 39.7 % (ref 42–52)
HDLC SERPL-MCNC: 45 MG/DL (ref 35–60)
HGB BLD-MCNC: 12.8 GM/DL (ref 14–18)
HGB UR QL STRIP: NEGATIVE
KETONES UR QL STRIP: NEGATIVE
LDLC SERPL CALC-MCNC: 65 MG/DL (ref 50–140)
LEUKOCYTE ESTERASE UR QL STRIP: NEGATIVE
LYMPHOCYTES # BLD AUTO: 1 X10(3)/MCL (ref 0.6–4.6)
LYMPHOCYTES NFR BLD AUTO: 22 %
MCH RBC QN AUTO: 30.8 PG (ref 27–31)
MCHC RBC AUTO-ENTMCNC: 32.2 GM/DL (ref 33–36)
MCV RBC AUTO: 95.4 FL (ref 80–94)
MICROALBUMIN UR-MCNC: 8.5 UG/ML
MICROALBUMIN/CREAT RATIO PNL UR: 12 MG/GM CR (ref 0–30)
MONOCYTES # BLD AUTO: 0.4 X10(3)/MCL (ref 0.1–1.3)
MONOCYTES NFR BLD AUTO: 9 %
NEUTROPHILS # BLD AUTO: 2.99 X10(3)/MCL (ref 2.1–9.2)
NEUTROPHILS NFR BLD AUTO: 63 %
NITRITE UR QL STRIP: NEGATIVE
PH UR STRIP: 5 [PH] (ref 5–9)
PLATELET # BLD AUTO: 172 X10(3)/MCL (ref 130–400)
PMV BLD AUTO: 11 FL (ref 9.4–12.4)
POTASSIUM SERPL-SCNC: 4.2 MMOL/L (ref 3.5–5.1)
PROT SERPL-MCNC: 6.4 GM/DL (ref 5.8–7.6)
PROT UR QL STRIP: NEGATIVE
PSA SERPL-MCNC: 1.19 NG/ML
RBC # BLD AUTO: 4.16 X10(6)/MCL (ref 4.7–6.1)
RBC #/AREA URNS HPF: NORMAL /[HPF]
SODIUM SERPL-SCNC: 140 MMOL/L (ref 136–145)
SP GR UR STRIP: 1.01 (ref 1–1.03)
SQUAMOUS EPITHELIAL, UA: NORMAL /HPF (ref 0–4)
TRIGL SERPL-MCNC: 48 MG/DL (ref 34–140)
TSH SERPL-ACNC: 5.84 UIU/ML (ref 0.35–4.94)
UROBILINOGEN UR STRIP-ACNC: 0.2
VLDLC SERPL CALC-MCNC: 10 MG/DL
WBC # SPEC AUTO: 4.7 X10(3)/MCL (ref 4.5–11.5)
WBC #/AREA URNS HPF: NORMAL /HPF

## 2021-09-20 LAB
LEFT EYE DM RETINOPATHY: NEGATIVE
LEFT EYE DM RETINOPATHY: NEGATIVE
RIGHT EYE DM RETINOPATHY: NEGATIVE
RIGHT EYE DM RETINOPATHY: NEGATIVE

## 2021-11-10 ENCOUNTER — HISTORICAL (OUTPATIENT)
Dept: ADMINISTRATIVE | Facility: HOSPITAL | Age: 71
End: 2021-11-10

## 2021-11-12 ENCOUNTER — HISTORICAL (OUTPATIENT)
Dept: ADMINISTRATIVE | Facility: HOSPITAL | Age: 71
End: 2021-11-12

## 2021-11-12 LAB
ABS NEUT (OLG): 2.95 X10(3)/MCL (ref 2.1–9.2)
BASOPHILS # BLD AUTO: 0 X10(3)/MCL (ref 0–0.2)
BASOPHILS NFR BLD AUTO: 1 %
EOSINOPHIL # BLD AUTO: 0.2 X10(3)/MCL (ref 0–0.9)
EOSINOPHIL NFR BLD AUTO: 5 %
ERYTHROCYTE [DISTWIDTH] IN BLOOD BY AUTOMATED COUNT: 13.6 % (ref 11.5–17)
HCT VFR BLD AUTO: 40.2 % (ref 42–52)
HGB BLD-MCNC: 12.8 GM/DL (ref 14–18)
LYMPHOCYTES # BLD AUTO: 1.3 X10(3)/MCL (ref 0.6–4.6)
LYMPHOCYTES NFR BLD AUTO: 26 %
MCH RBC QN AUTO: 30.3 PG (ref 27–31)
MCHC RBC AUTO-ENTMCNC: 31.8 GM/DL (ref 33–36)
MCV RBC AUTO: 95.3 FL (ref 80–94)
MONOCYTES # BLD AUTO: 0.4 X10(3)/MCL (ref 0.1–1.3)
MONOCYTES NFR BLD AUTO: 9 %
NEUTROPHILS # BLD AUTO: 2.95 X10(3)/MCL (ref 2.1–9.2)
NEUTROPHILS NFR BLD AUTO: 60 %
PLATELET # BLD AUTO: 181 X10(3)/MCL (ref 130–400)
PMV BLD AUTO: 10.3 FL (ref 9.4–12.4)
RBC # BLD AUTO: 4.22 X10(6)/MCL (ref 4.7–6.1)
TSH SERPL-ACNC: 6.58 UIU/ML (ref 0.35–4.94)
WBC # SPEC AUTO: 5 X10(3)/MCL (ref 4.5–11.5)

## 2021-12-10 ENCOUNTER — HISTORICAL (OUTPATIENT)
Dept: SURGERY | Facility: HOSPITAL | Age: 71
End: 2021-12-10

## 2022-02-23 ENCOUNTER — HISTORICAL (OUTPATIENT)
Dept: ADMINISTRATIVE | Facility: HOSPITAL | Age: 72
End: 2022-02-23

## 2022-02-23 LAB
ABS NEUT (OLG): 3.04 (ref 2.1–9.2)
ALBUMIN SERPL-MCNC: 4 G/DL (ref 3.4–4.8)
ALBUMIN/GLOB SERPL: 1.6 {RATIO} (ref 1.1–2)
ALP SERPL-CCNC: 28 U/L (ref 40–150)
ALT SERPL-CCNC: 15 U/L (ref 0–55)
APPEARANCE, UA: CLEAR
AST SERPL-CCNC: 19 U/L (ref 5–34)
BACTERIA SPEC CULT: NORMAL
BASOPHILS # BLD AUTO: 0 10*3/UL (ref 0–0.2)
BASOPHILS NFR BLD AUTO: 1 %
BILIRUB SERPL-MCNC: 0.5 MG/DL
BILIRUB UR QL STRIP: NEGATIVE
BILIRUBIN DIRECT+TOT PNL SERPL-MCNC: 0.2 (ref 0–0.5)
BILIRUBIN DIRECT+TOT PNL SERPL-MCNC: 0.3 (ref 0–0.8)
BUDDING YEAST: NORMAL
BUN SERPL-MCNC: 21.7 MG/DL (ref 8.4–25.7)
CALCIUM SERPL-MCNC: 9.3 MG/DL (ref 8.7–10.5)
CASTS, UA: NORMAL
CHLORIDE SERPL-SCNC: 103 MMOL/L (ref 98–107)
CHOLEST SERPL-MCNC: 133 MG/DL
CHOLEST/HDLC SERPL: 3 {RATIO} (ref 0–5)
CO2 SERPL-SCNC: 28 MMOL/L (ref 23–31)
COLOR UR: YELLOW
CREAT SERPL-MCNC: 0.87 MG/DL (ref 0.73–1.18)
CREAT UR-MCNC: 125.9 MG/DL (ref 58–161)
CRYSTALS: NORMAL
EOSINOPHIL # BLD AUTO: 0.3 10*3/UL (ref 0–0.9)
EOSINOPHIL NFR BLD AUTO: 5 %
ERYTHROCYTE [DISTWIDTH] IN BLOOD BY AUTOMATED COUNT: 13.4 % (ref 11.5–17)
EST. AVERAGE GLUCOSE BLD GHB EST-MCNC: 88.2 MG/DL
GLOBULIN SER-MCNC: 2.5 G/DL (ref 2.4–3.5)
GLUCOSE (UA): NEGATIVE
GLUCOSE SERPL-MCNC: 97 MG/DL (ref 82–115)
HBA1C MFR BLD: 4.7 %
HCT VFR BLD AUTO: 40.1 % (ref 42–52)
HDLC SERPL-MCNC: 49 MG/DL (ref 35–60)
HEMOLYSIS INTERF INDEX SERPL-ACNC: 1
HGB BLD-MCNC: 12.7 G/DL (ref 14–18)
HGB UR QL STRIP: NEGATIVE
ICTERIC INTERF INDEX SERPL-ACNC: 1
KETONES UR QL STRIP: NEGATIVE
LDLC SERPL CALC-MCNC: 75 MG/DL (ref 50–140)
LEUKOCYTE ESTERASE UR QL STRIP: NEGATIVE
LIPEMIC INTERF INDEX SERPL-ACNC: <0
LYMPHOCYTES # BLD AUTO: 1.1 10*3/UL (ref 0.6–4.6)
LYMPHOCYTES NFR BLD AUTO: 22 %
MANUAL DIFF? (OHS): NO
MCH RBC QN AUTO: 30.2 PG (ref 27–31)
MCHC RBC AUTO-ENTMCNC: 31.7 G/DL (ref 33–36)
MCV RBC AUTO: 95.5 FL (ref 80–94)
MICROALBUMIN UR-MCNC: 18.8
MICROALBUMIN/CREAT RATIO PNL UR: 14.9 (ref 0–30)
MONOCYTES # BLD AUTO: 0.4 10*3/UL (ref 0.1–1.3)
MONOCYTES NFR BLD AUTO: 8 %
NEUTROPHILS # BLD AUTO: 3.04 10*3/UL (ref 2.1–9.2)
NEUTROPHILS NFR BLD AUTO: 64 %
NITRITE UR QL STRIP: NEGATIVE
PH UR STRIP: 5 [PH] (ref 5–9)
PLATELET # BLD AUTO: 160 10*3/UL (ref 130–400)
PMV BLD AUTO: 10.2 FL (ref 9.4–12.4)
POTASSIUM SERPL-SCNC: 4.7 MMOL/L (ref 3.5–5.1)
PROT SERPL-MCNC: 6.5 G/DL (ref 5.8–7.6)
PROT UR QL STRIP: NEGATIVE
RBC # BLD AUTO: 4.2 10*6/UL (ref 4.7–6.1)
RBC #/AREA URNS HPF: NORMAL /[HPF] (ref 0–2)
SMALL ROUND CELLS, UA: NORMAL
SODIUM SERPL-SCNC: 141 MMOL/L (ref 136–145)
SP GR UR STRIP: 1.02 (ref 1–1.03)
SPERM URNS QL MICRO: NORMAL
SQUAMOUS EPITHELIAL, UA: NORMAL (ref 0–4)
TRIGL SERPL-MCNC: 47 MG/DL (ref 34–140)
TSH SERPL-ACNC: 10.16 M[IU]/L (ref 0.35–4.94)
UROBILINOGEN UR STRIP-ACNC: 0.2
VLDLC SERPL CALC-MCNC: 9 MG/DL
WBC # SPEC AUTO: 4.8 10*3/UL (ref 4.5–11.5)
WBC #/AREA URNS HPF: NORMAL /[HPF] (ref 0–2)

## 2022-04-11 ENCOUNTER — HISTORICAL (OUTPATIENT)
Dept: ADMINISTRATIVE | Facility: HOSPITAL | Age: 72
End: 2022-04-11
Payer: MEDICARE

## 2022-04-28 VITALS
BODY MASS INDEX: 32.65 KG/M2 | HEIGHT: 69 IN | DIASTOLIC BLOOD PRESSURE: 60 MMHG | WEIGHT: 220.44 LBS | OXYGEN SATURATION: 97 % | SYSTOLIC BLOOD PRESSURE: 130 MMHG

## 2022-04-30 NOTE — H&P
Patient:   Darwin Fletcher            MRN: [id number]            FIN: [id number]               Age:   69 years     Sex:  Male     :  1950   Associated Diagnoses:   None   Author:   SARAI Perry MD      Chief Complaint   Surveillance colonoscopy      History of Present Illness   69-year-old individual with a prior history of colon polyps with his last colonoscopy 5 years ago who comes in for surveillance.  He has a BMI in the mid 40s and sleep apnea.  He denies any abdominal pain change in bowel habits rectal bleeding constipation or diarrhea he is on no anticoagulants.      Health Status   Allergies:    Allergies (1) Active Reaction  No Known Allergies None Documented     Current medications:  (Selected)   Inpatient Medications  Ordered  Buffered Lidocaine 1% - 1mL syringe: 0.5 mL, 5 mg =, form: Injection, ID, As Directed PRN for other (see comment), first dose 20 7:59:00 CDT, May inject 0.5mL at IV site, if not allergic  Plasmalyte 1,000 mL: 1,000 mL, 1,000 mL, IV, 20 mL/hr, start date 20 7:59:00 CDT  Pending Complete  flurbiprofen ophthalmic: 1 drop(s), form: Soln-Opth, Eye-Left, q30min, Order duration: 4 dose(s), first dose 13 7:30:00 CST, stop date 13 9:29:00 CST, on arrival and every 30 minutes x 4 doses  flurbiprofen ophthalmic: 1 drop(s), form: Soln-Opth, Eye-Right, q30min, Order duration: 4 dose(s), first dose 13 10:00:00 CST, stop date 13 11:59:00 CST, on arrival and every 30 minutes x 4 doses  Prescriptions  Prescribed  BREEZE CONTOUR TRUEMETIX TEST STRIPS: BREEZE CONTOUR TRUEMETIX TEST STRIPS, See Instructions, CHECK CBG DAILY    DX: E11.9, # 100 EA, 11 Refill(s), Pharmacy: [place] [address] [id number]  BREEZE CONTOUR TRUEMETRIX LANCETS: BREEZE CONTOUR TRUEMETRIX LANCETS, See Instructions, CHECK CBG DAILY    DX: E11.9, # 100 EA, 11 Refill(s), Pharmacy: [facility]  BREEZE CONTOUR TRUEMETRIX METER: BREEZE CONTOUR TRUEMETRIX METER, See  Instructions, CHECK CBG DAILY    DX: E11.9, # 1 EA, 0 Refill(s), Pharmacy: Pending sale to [facility]  [facility] omeprazole 20 mg oral EC  (do not sub.): See Instructions, TAKE ONE CAPSULE BY MOUTH EVERY DAY, # 90 cap(s), 4 Refill(s), eRx: [place] [address] [id number]  Viagra 50 mg oral tablet: See Instructions, PRN for erectile dysfunction, 1 tab po   *1 hour before sexual activity*, # 5 tab(s), 15 Refill(s), Pharmacy: Pending sale to [facility], Patient Education Provided, Patient Verbalizes Understanding  allopurinol 300 mg oral tablet: See Instructions, TAKE 1 TABLET BY MOUTH ONCE DAILY, # 90 tab(s), 4 Refill(s), eRx: [place] [id number]  bisoprolol 5 mg oral tablet: See Instructions, TAKE ONE TABLET BY MOUTH EVERY DAY, # 90 tab(s), 3 Refill(s), Pharmacy: [place] [address] [facility], 175, cm, Height/Length Dosing, 02/13/20 8:05:00 CST, 121.1, kg, Weight Dosing, 02/13/20 8:05:00 CST  fenofibrate 160 mg oral tablet: See Instructions, Take 1 tablet by mouth once daily, # 90 EA, 4 Refill(s), Pharmacy: [facility], 175, cm, Height/Length Dosing, 02/13/20 8:05:00 CST, 121.1, kg, Weight Dosing, 02/13/20 8:05:00 CST  glimepiride 2 mg oral tablet: 2 mg = 1 tab(s), Oral, BID, # 180 tab(s), 4 Refill(s), Pharmacy: [place] [address] [facility], 175, cm, Height/Length Dosing, 02/13/20 8:05:00 CST, 121.1, kg, Weight Dosing, 02/13/20 8:05:00 CST  levothyroxine 200 mcg (0.2 mg) oral tablet: See Instructions, TAKE ONE TABLET BY MOUTH EVERY DAY, # 90 tab(s), 1 Refill(s), Pharmacy: [place] [address] [facility], 175, cm, Height/Length Dosing, 02/13/20 8:05:00 CST, 121.1, kg, Weight Dosing, 02/13/20 8:05:00 CST  metformin 500 mg oral tablet: See Instructions, TAKE ONE TABLET BY MOUTH TWICE A DAY, # 60 tab(s), 12 Refill(s), Pharmacy: [place] [address] [facility]  traZODONE 50 mg oral tablet ( Desyrel ): 50 mg = 1 tab(s), Oral, Once a day (at bedtime), # 90 tab(s), 1 Refill(s), Pharmacy: [place] [address] [facility], 175, cm, Height/Length Dosing, 02/13/20 8:05:00 CST, 121.1,  kg, Weight Dosing, 02/13/20 8:05:00 CST  Documented Medications  Documented  aspirin 81 mg oral Delayed Release (EC) tablet: 81 mg = 1 tab(s), Oral, Daily, 0 Refill(s)   Problem list:    Active Problems (7)  Coronary artery disease   Hypertension   Hyperuricemia without signs of inflammatory arthritis and tophaceous disease   Hypothyroidism   NIDDM   Seasonal allergies   Sleep apnea         Histories   Procedure history:    [facility] - Extracapsular cataract extraction ([id number]) on 1/23/2013 at 62 Years.  Comments:  2/23/2013 20:03 DAVID - Mariam PARRA , Kristie Conn  left  excision right arm lipoma on 4/13/2011 at 60 Years.  Angiography ([id number]) in 2004 at 54 Years.  Placement of stent in cardiac conduit ([id number]).  Comments:  1/21/2013 0:34 DAVID - Mariam PARRA , Kristie Conn  2007  Arthroscopy of knee joint ([id number]).  excision skin cancer on arm.  Vasectomy ([id number]).  Cataract ([id number]).      Physical Examination   General:  Alert and oriented, Increased BMI.    Eye:  Pupils are equal, round and reactive to light.    Respiratory:  Lungs are clear to auscultation.    Cardiovascular:  Normal rate.    Gastrointestinal:  Soft.    Vital Signs   7/27/2020 8:01 CDT       Temperature Temporal Artery               36.9 DegC                             Heart Rate Monitored      56 bpm  LOW                             Respiratory Rate          13 br/min                             SpO2                      97 %                             Oxygen Therapy            Room air                             Systolic Blood Pressure   165 mmHg  HI                             Diastolic Blood Pressure  75 mmHg                             Mean Arterial Pressure, Cuff              105 mmHg                             Blood Pressure Location   Left arm        Vital Signs (last 24 hrs)_____  Last Charted___________  Resp Rate         13 br/min  (JUL 27 08:01)  SBP      H 165mmHg  (JUL 27 08:01)  DBP      75 mmHg  (JUL 27 08:01)  SpO2      97  %  (JUL 27 08:01)  Weight      115 kg  (JUL 27 08:00)  Height      175 cm  (JUL 27 08:00)  BMI      37.55  (JUL 27 08:00)     Neurologic:  Alert.    Psychiatric:  Cooperative.       Review / Management   Results review:     No qualifying data available.       Impression and Plan   Surveillance colonoscopy    SARAI Perry M.D.

## 2022-05-14 RX ORDER — OMEPRAZOLE 40 MG/1
CAPSULE, DELAYED RELEASE ORAL
Qty: 90 CAPSULE | Refills: 0 | Status: SHIPPED | OUTPATIENT
Start: 2022-05-14 | End: 2022-08-14

## 2022-06-06 ENCOUNTER — OFFICE VISIT (OUTPATIENT)
Dept: INTERNAL MEDICINE | Facility: CLINIC | Age: 72
End: 2022-06-06
Payer: MEDICARE

## 2022-06-06 VITALS
WEIGHT: 229 LBS | BODY MASS INDEX: 33.92 KG/M2 | SYSTOLIC BLOOD PRESSURE: 138 MMHG | DIASTOLIC BLOOD PRESSURE: 64 MMHG | HEART RATE: 59 BPM | HEIGHT: 69 IN

## 2022-06-06 DIAGNOSIS — M79.605 MUSCULOSKELETAL LEG PAIN, LEFT: Primary | ICD-10-CM

## 2022-06-06 PROBLEM — E11.42 TYPE 2 DIABETES MELLITUS WITH DIABETIC POLYNEUROPATHY, WITHOUT LONG-TERM CURRENT USE OF INSULIN: Chronic | Status: ACTIVE | Noted: 2022-06-06

## 2022-06-06 PROBLEM — I10 HYPERTENSION: Chronic | Status: ACTIVE | Noted: 2022-06-06

## 2022-06-06 PROBLEM — J30.2 SEASONAL ALLERGIC RHINITIS: Status: ACTIVE | Noted: 2022-06-06

## 2022-06-06 PROBLEM — E11.42 TYPE 2 DIABETES MELLITUS WITH DIABETIC POLYNEUROPATHY, WITHOUT LONG-TERM CURRENT USE OF INSULIN: Status: ACTIVE | Noted: 2022-06-06

## 2022-06-06 PROBLEM — G47.30 SLEEP APNEA: Chronic | Status: ACTIVE | Noted: 2022-06-06

## 2022-06-06 PROBLEM — E03.9 HYPOTHYROIDISM: Chronic | Status: ACTIVE | Noted: 2022-06-06

## 2022-06-06 PROBLEM — G47.30 SLEEP APNEA: Status: ACTIVE | Noted: 2022-06-06

## 2022-06-06 PROBLEM — I27.29 OTHER SECONDARY PULMONARY HYPERTENSION: Status: ACTIVE | Noted: 2022-06-06

## 2022-06-06 PROBLEM — E03.9 HYPOTHYROIDISM: Status: ACTIVE | Noted: 2022-06-06

## 2022-06-06 PROBLEM — M10.9 GOUT: Chronic | Status: ACTIVE | Noted: 2022-06-06

## 2022-06-06 PROBLEM — I10 HYPERTENSION: Status: ACTIVE | Noted: 2022-06-06

## 2022-06-06 PROBLEM — I25.119 ATHEROSCLEROSIS OF NATIVE CORONARY ARTERY OF NATIVE HEART WITH ANGINA PECTORIS: Status: ACTIVE | Noted: 2022-06-06

## 2022-06-06 PROCEDURE — 99214 OFFICE O/P EST MOD 30 MIN: CPT | Mod: 25,,, | Performed by: NURSE PRACTITIONER

## 2022-06-06 PROCEDURE — 96372 THER/PROPH/DIAG INJ SC/IM: CPT | Mod: ,,, | Performed by: NURSE PRACTITIONER

## 2022-06-06 PROCEDURE — 99214 PR OFFICE/OUTPT VISIT, EST, LEVL IV, 30-39 MIN: ICD-10-PCS | Mod: 25,,, | Performed by: NURSE PRACTITIONER

## 2022-06-06 PROCEDURE — 96372 PR INJECTION,THERAP/PROPH/DIAG2ST, IM OR SUBCUT: ICD-10-PCS | Mod: ,,, | Performed by: NURSE PRACTITIONER

## 2022-06-06 RX ORDER — KETOROLAC TROMETHAMINE 10 MG/1
10 TABLET, FILM COATED ORAL EVERY 6 HOURS PRN
Qty: 20 TABLET | Refills: 0 | Status: SHIPPED | OUTPATIENT
Start: 2022-06-06 | End: 2022-06-11

## 2022-06-06 RX ORDER — LISINOPRIL 10 MG/1
10 TABLET ORAL 2 TIMES DAILY
COMMUNITY
Start: 2022-01-05 | End: 2023-01-23 | Stop reason: SDUPTHER

## 2022-06-06 RX ORDER — DEXAMETHASONE SODIUM PHOSPHATE 4 MG/ML
4 INJECTION, SOLUTION INTRA-ARTICULAR; INTRALESIONAL; INTRAMUSCULAR; INTRAVENOUS; SOFT TISSUE
Status: COMPLETED | OUTPATIENT
Start: 2022-06-06 | End: 2022-06-06

## 2022-06-06 RX ORDER — KETOROLAC TROMETHAMINE 30 MG/ML
30 INJECTION, SOLUTION INTRAMUSCULAR; INTRAVENOUS
Status: COMPLETED | OUTPATIENT
Start: 2022-06-06 | End: 2022-06-06

## 2022-06-06 RX ORDER — GABAPENTIN 300 MG/1
300 CAPSULE ORAL 3 TIMES DAILY
Qty: 90 CAPSULE | Refills: 0 | Status: SHIPPED | OUTPATIENT
Start: 2022-06-06 | End: 2022-09-13

## 2022-06-06 RX ORDER — BISACODYL 5 MG/1
TABLET, SUGAR COATED ORAL
COMMUNITY
End: 2022-09-13

## 2022-06-06 RX ORDER — FENOFIBRATE 160 MG/1
160 TABLET ORAL DAILY
COMMUNITY
Start: 2022-03-16 | End: 2022-12-13 | Stop reason: SDUPTHER

## 2022-06-06 RX ORDER — ALLOPURINOL 300 MG/1
300 TABLET ORAL DAILY
COMMUNITY
Start: 2022-04-25 | End: 2023-01-17 | Stop reason: SDUPTHER

## 2022-06-06 RX ORDER — TRAZODONE HYDROCHLORIDE 50 MG/1
50 TABLET ORAL NIGHTLY
COMMUNITY
Start: 2021-10-14 | End: 2022-10-26 | Stop reason: SDUPTHER

## 2022-06-06 RX ORDER — AMLODIPINE BESYLATE 10 MG/1
10 TABLET ORAL DAILY
COMMUNITY
Start: 2022-01-06 | End: 2022-12-28

## 2022-06-06 RX ADMIN — DEXAMETHASONE SODIUM PHOSPHATE 4 MG: 4 INJECTION, SOLUTION INTRA-ARTICULAR; INTRALESIONAL; INTRAMUSCULAR; INTRAVENOUS; SOFT TISSUE at 03:06

## 2022-06-06 RX ADMIN — KETOROLAC TROMETHAMINE 30 MG: 30 INJECTION, SOLUTION INTRAMUSCULAR; INTRAVENOUS at 03:06

## 2022-06-06 NOTE — PROGRESS NOTES
Patient ID: [id number]     Chief Complaint: No chief complaint on file.        HPI:     Darwin Fletcher is a 71 y.o. male patient of Dr. Smith  Who presents to clinic today with c/o left leg pain x 6- 8 weeks  Denies lower back pain  Pain agg by sitting,inactivity  Made better with activity, walking  Pain rated 8/10, describes as burning/nerve pain  Has tried tylenol, ibuprofen without relief of symptoms   Has surgery on left foot in dec 2021, had to wear a boot on left foot after for a few weeks, states his left leg may be weaker since he was in the boot for a while      Past Medical History:  Gout  Hypertension  Hypothyroidism  Sleep apnea      Comment:  uses CPAP uses CPAP  Type 2 diabetes mellitus with diabetic polyneuropathy, without long-  term current use of insulin     History reviewed. No pertinent surgical history.    Review of patient's allergies indicates:  No Known Allergies    Outpatient Medications Marked as Taking for the 6/6/22 encounter (Office Visit) with AMY Traore   Medication Sig Dispense Refill    allopurinoL (ZYLOPRIM) 300 MG tablet Take 300 mg by mouth once daily.      amLODIPine (NORVASC) 5 MG tablet   See Instructions, TAKE TWO TABLETS BY MOUTH EVERY DAY, # 60 tab(s), 11 Refill(s), Pharmacy: [place] [address] [facility], 175, cm, Height/Length Dosing, 12/10/21 6:00:00 CST, 100, kg, Weight Dosing, 12/10/21 6:00:00 CST      aspirin 81 mg Cap       vujmcdlcqoje-EF-ddouhzjpqyv-GG (SINUS RELIEF MAX STR DAY-NIGHT) 5-325 mg(d)/ 12.5-5-325mg(n) TbSQ       fenofibrate 160 MG Tab Take 160 mg by mouth once daily.      levothyroxine 137 mcg/mL Soln       lisinopriL 10 MG tablet Take 10 mg by mouth.      metFORMIN (GLUCOPHAGE) 500 MG tablet TAKE ONE TABLET BY MOUTH TWICE A DAY 60 tablet 12    omeprazole (PRILOSEC) 40 MG capsule TAKE ONE CAPSULE BY MOUTH EVERY DAY 90 capsule 0    traZODone (DESYREL) 50 MG tablet   See Instructions, TAKE ONE TABLET BY MOUTH AT BEDTIME, # 90 tab(s), 3  "Refill(s), Pharmacy: [facility], 175, cm, Height/Length Dosing, 08/24/21 8:43:00 CDT, 96.2, kg, Weight Dosing, 08/24/21 8:43:00 CDT       Current Facility-Administered Medications for the 6/6/22 encounter (Office Visit) with Fred Hay AMY   Medication Dose Route Frequency Provider Last Rate Last Admin    [COMPLETED] dexamethasone injection 4 mg  4 mg Intramuscular 1 time in Clinic/HOD Fred Hay, FNP   4 mg at 06/06/22 1511    [COMPLETED] ketorolac injection 30 mg  30 mg Intramuscular 1 time in Clinic/HOD Fred Hay, FNP   30 mg at 06/06/22 1512       Social History     Socioeconomic History    Marital status:    Tobacco Use    Smoking status: Never Smoker    Smokeless tobacco: Never Used        History reviewed. No pertinent family history.     Subjective:     ROS      See HPI for details    Constitutional: Denies Change in appetite. Denies Chills. Denies Fever. Denies Night sweats.  Musculoskeletal: + left leg pain.  Denies Weakness.  Integumentary: Denies Rash. Denies Itching. Denies Dry skin.    All Other ROS: Negative except as stated in HPI.       Objective:     /64 (BP Location: Left arm, Patient Position: Sitting, BP Method: Medium (Automatic))   Pulse (!) 59   Ht 5' 9" (1.753 m)   Wt 103.9 kg (229 lb)   BMI 33.82 kg/m²     Physical Exam    General: Alert and oriented, No acute distress.  Head: Normocephalic,  Respiratory:  Symmetrical chest wall expansion.  Musculoskeletal:  Lumbar spine nontender to palpation.  Sciatic notch bilaterally nontender to palpation.  Negative straight leg raise bilaterally.  Normal external rotation of hips bilaterally.  Hamstrings are nontender to palpation bilaterally  Integumentary: Warm, Dry, Intact,   Neurologic: No focal deficits,   Psychiatric: Normal interaction, Appropriate affect         Assessment:       ICD-10-CM ICD-9-CM   1. Musculoskeletal leg pain, left  M79.605 729.5        Plan:     1. Musculoskeletal leg pain, " left  - ketorolac injection 30 mg  - ketorolac (TORADOL) 10 mg tablet; Take 1 tablet (10 mg total) by mouth every 6 (six) hours as needed for Pain.  Dispense: 20 tablet; Refill: 0  - dexamethasone injection 4 mg  - gabapentin (NEURONTIN) 300 MG capsule; Take 1 capsule (300 mg total) by mouth 3 (three) times daily. Prn left leg pain  Dispense: 90 capsule; Refill: 0  - Ambulatory referral/consult to Physical/Occupational Therapy; Future    toradol and decadron im x 1  toradol po prn  Discussed rotating ice and heat  Referral to PT to eval and treat  Gabapentin prn pain, can cause drowsiness, start at bedtime          E/M level based on time:   5 minutes spent reviewing the patient chart, interpreting labs, tests.  5 minutes spent in the room with the patient performing a history and exam as well as reviewing the lab/test results  10 minutes was spent counseling/educating the patient/caregiver  5 minutes spent ordering medications, tests, or procedures  5 minutes spent documenting clinical information in the electronic health record.  Total Time Spent: 30 minutes    Medication List with Changes/Refills   New Medications    GABAPENTIN (NEURONTIN) 300 MG CAPSULE    Take 1 capsule (300 mg total) by mouth 3 (three) times daily. Prn left leg pain       Start Date: 6/6/2022  End Date: 6/6/2023    KETOROLAC (TORADOL) 10 MG TABLET    Take 1 tablet (10 mg total) by mouth every 6 (six) hours as needed for Pain.       Start Date: 6/6/2022  End Date: 6/11/2022   Current Medications    ALLOPURINOL (ZYLOPRIM) 300 MG TABLET    Take 300 mg by mouth once daily.       Start Date: 4/25/2022 End Date: --    AMLODIPINE (NORVASC) 5 MG TABLET      See Instructions, TAKE TWO TABLETS BY MOUTH EVERY DAY, # 60 tab(s), 11 Refill(s), Pharmacy: [place] [address] [facility], 175, cm, Height/Length Dosing, 12/10/21 6:00:00 CST, 100, kg, Weight Dosing, 12/10/21 6:00:00 CST       Start Date: 1/6/2022  End Date: --    ASPIRIN 81 MG CAP           Start Date: --         End Date: --    MVXHOZEYZSEX-YM-MBWKSYRKNFJ-GG (SINUS RELIEF MAX STR DAY-NIGHT) 5-325 MG(D)/ 12.5-5-325MG(N) TBSQ           Start Date: --        End Date: --    FENOFIBRATE 160 MG TAB    Take 160 mg by mouth once daily.       Start Date: 3/16/2022 End Date: --    LEVOTHYROXINE 137 MCG/ML SOLN           Start Date: --        End Date: --    LISINOPRIL 10 MG TABLET    Take 10 mg by mouth.       Start Date: 1/5/2022  End Date: --    METFORMIN (GLUCOPHAGE) 500 MG TABLET    TAKE ONE TABLET BY MOUTH TWICE A DAY       Start Date: 5/22/2022 End Date: --    OMEPRAZOLE (PRILOSEC) 40 MG CAPSULE    TAKE ONE CAPSULE BY MOUTH EVERY DAY       Start Date: 5/14/2022 End Date: --    TRAZODONE (DESYREL) 50 MG TABLET      See Instructions, TAKE ONE TABLET BY MOUTH AT BEDTIME, # 90 tab(s), 3 Refill(s), Pharmacy: [place] [address] [facility], 175, cm, Height/Length Dosing, 08/24/21 8:43:00 CDT, 96.2, kg, Weight Dosing, 08/24/21 8:43:00 CDT       Start Date: 10/14/2021End Date: --          Follow up if symptoms worsen or fail to improve.

## 2022-06-08 ENCOUNTER — TELEPHONE (OUTPATIENT)
Dept: INTERNAL MEDICINE | Facility: CLINIC | Age: 72
End: 2022-06-08
Payer: MEDICARE

## 2022-06-08 NOTE — TELEPHONE ENCOUNTER
----- Message from Natalie Gatica sent at 6/8/2022 11:04 AM CDT -----  Regarding: Advice  Type:  Needs Medical Advice    Who Called: Patient  Symptoms (please be specific):    How long has patient had these symptoms:    Pharmacy name and phone #:    Would the patient rather a call back or a response via [facility]?   Best Call Back Number: [phone]  Additional Information: [facility] still has not given him a call about his referral..please advise

## 2022-06-13 ENCOUNTER — TELEPHONE (OUTPATIENT)
Dept: INTERNAL MEDICINE | Facility: CLINIC | Age: 72
End: 2022-06-13
Payer: MEDICARE

## 2022-06-13 NOTE — TELEPHONE ENCOUNTER
----- Message from Que Madden sent at 6/13/2022  1:27 PM CDT -----  .Type:  Needs Medical Advice    Who Called: Darwin  Symptoms (please be specific):    How long has patient had these symptoms:    Pharmacy name and phone #:    Would the patient rather a call back or a response via [facility]?   Best Call Back Number: [phone]  Additional Information: re: a referral for Physical Therapy [facility], they have not received anything yet.

## 2022-06-29 NOTE — TELEPHONE ENCOUNTER
Patient stated that he has been getting Amlodipine 5mg bid and Amlodipine 10mg daily advised to try the 10mg and monitor if that doesn't help to let us know  ----- Message from Anali Abreu sent at 6/29/2022  1:38 PM CDT -----  Has questions about his BP medications  and dosage  [phone]

## 2022-07-13 ENCOUNTER — OFFICE VISIT (OUTPATIENT)
Dept: INTERNAL MEDICINE | Facility: CLINIC | Age: 72
End: 2022-07-13
Payer: MEDICARE

## 2022-07-13 VITALS
WEIGHT: 226 LBS | DIASTOLIC BLOOD PRESSURE: 80 MMHG | HEIGHT: 69 IN | OXYGEN SATURATION: 96 % | SYSTOLIC BLOOD PRESSURE: 120 MMHG | HEART RATE: 63 BPM | BODY MASS INDEX: 33.47 KG/M2

## 2022-07-13 DIAGNOSIS — M54.30 SCIATIC NERVE PAIN, UNSPECIFIED LATERALITY: ICD-10-CM

## 2022-07-13 DIAGNOSIS — M79.605 MUSCULOSKELETAL LEG PAIN, LEFT: Primary | ICD-10-CM

## 2022-07-13 PROCEDURE — 99214 OFFICE O/P EST MOD 30 MIN: CPT | Mod: ,,, | Performed by: INTERNAL MEDICINE

## 2022-07-13 PROCEDURE — 99214 PR OFFICE/OUTPT VISIT, EST, LEVL IV, 30-39 MIN: ICD-10-PCS | Mod: ,,, | Performed by: INTERNAL MEDICINE

## 2022-07-13 RX ORDER — METHYLPREDNISOLONE 4 MG/1
TABLET ORAL
Qty: 21 EACH | Refills: 0 | Status: SHIPPED | OUTPATIENT
Start: 2022-07-13 | End: 2022-08-03

## 2022-07-13 RX ORDER — KETOROLAC TROMETHAMINE 10 MG/1
10 TABLET, FILM COATED ORAL 3 TIMES DAILY
Qty: 15 TABLET | Refills: 0 | Status: SHIPPED | OUTPATIENT
Start: 2022-07-13 | End: 2022-07-18

## 2022-07-13 NOTE — PROGRESS NOTES
Subjective:      Patient ID: Darwin Fletcher is a 71 y.o. male.    Chief Complaint: Leg Pain      HPI:  The patient is a 71-year-old established patient who has developed symptoms of sciatica involving his left buttock, and his left lower leg.  He remembers lifting heavy objects over the weekend, and it appears that this problem was created at that time.  He was recently given corticosteroids and Toradol, and he seemed to improve, but his symptoms returned, after the 1 or 2 days of medication that he used.  He has a limp in the left leg when he walks, and he and I discussed sciatica and its related symptoms in detail during his visit.     The patient's Health Maintenance was reviewed and the following appears to be due at this time:   Health Maintenance Due   Topic Date Due    Hepatitis C Screening  Never done    TETANUS VACCINE  Never done    High Dose Statin  Never done    Shingles Vaccine (2 of 2) 12/23/2020        Recent Labwork  No visits with results within 1 Month(s) from this visit.   Latest known visit with results is:   Historical on 02/23/2022   Component Date Value Ref Range Status    Hemoglobin A1c 02/23/2022 4.7  <=7.0 Final    Estimated Average Glucose 02/23/2022 88.2   Final    WBC 02/23/2022 4.8  4.5 - 11.5 Final    RBC 02/23/2022 4.20  4.70 - 6.10 Final    Hgb 02/23/2022 12.7  14.0 - 18.0 Final    Hct 02/23/2022 40.1  42.0 - 52.0 Final    MCV 02/23/2022 95.5  80.0 - 94.0 Final    MCH 02/23/2022 30.2  27.0 - 31.0 Final    MCHC 02/23/2022 31.7  33.0 - 36.0 Final    RDW 02/23/2022 13.4  11.5 - 17.0 Final    Platelet 02/23/2022 160  130 - 400 Final    MPV 02/23/2022 10.2  9.4 - 12.4 Final    Abs Neut 02/23/2022 3.04  2.10 - 9.20 Final    Manual Diff? 02/23/2022 No   Final    Neut % 02/23/2022 64   Final    Lymph % 02/23/2022 22   Final    Mono % 02/23/2022 8   Final    Eos % 02/23/2022 5   Final    Basophil % 02/23/2022 1   Final    Lymph # 02/23/2022 1.1  0.6 - 4.6 Final     Neut # 02/23/2022 3.04  2.10 - 9.20 Final    Mono # 02/23/2022 0.4  0.1 - 1.3 Final    Eos # 02/23/2022 0.3  0.0 - 0.9 Final    Baso # 02/23/2022 0.0  0.0 - 0.2 Final    Color, UA 02/23/2022 YELLOW  YELLOW Final    Appearance, UA 02/23/2022 CLEAR  Clear Final    Specific Gravity,UA 02/23/2022 1.023  1.000 - 1.032 Final    pH, UA 02/23/2022 5.0  5.0 - 9.0 Final    Protein, UA 02/23/2022 Negative  Negative Final    Glucose, UA 02/23/2022 Negative  Negative Final    Ketones, UA 02/23/2022 Negative  Negative Final    Bilirubin (UA) 02/23/2022 Negative  Negative Final    Occult Blood UA 02/23/2022 Negative  Negative Final    Urobilinogen, UA 02/23/2022 0.2   Final    Nitrite, UA 02/23/2022 Negative  Negative Final    Leukocytes, UA 02/23/2022 Negative  Negative Final    WBC, UA 02/23/2022 NONE SEEN  0 - 2 Final    RBC, UA 02/23/2022 NONE SEEN  0 - 2 Final    Squam Epithel, UA 02/23/2022 NONE SEEN  0 - 4 Final    Bacteria 02/23/2022 NONE SEEN  None Seen Final    Yeast, UA 02/23/2022 NOT PRESENT   Final    Sperm, UA 02/23/2022 NOT PRESENT   Final    Crystals 02/23/2022 NOT PRESENT   Final    Small Round Cells, UA 02/23/2022 NOT PRESENT   Final    CASTS, UA 02/23/2022 NONE SEEN   Final    Thyroid Stimulating Hormone 02/23/2022 10.1647  0.3500 - 4.9400 Final    Urine Microalbumin 02/23/2022 18.8  <=30.0 Final    Urine Creatinine 02/23/2022 125.9  58.0 - 161.0 Final    Microalbumin Creatinine Ratio 02/23/2022 14.9  0.0 - 30.0 Final    Estimated GFR- 02/23/2022 >60   Final    Estimated GFR-Non  02/23/2022 >60   Final    Cholesterol Total 02/23/2022 133  <=200 Final    HDL Cholesterol 02/23/2022 49  35 - 60 Final    Triglyceride 02/23/2022 47  34 - 140 Final    Very Low Density Lipoprotein 02/23/2022 9   Final    Cholesterol/HDL Ratio 02/23/2022 3  0 - 5 Final    LDL Cholesterol 02/23/2022 75.00  50.00 - 140.00 Final    Sodium Level 02/23/2022 141  136 - 145  Final    Potassium Level 02/23/2022 4.7  3.5 - 5.1 Final    Chloride 02/23/2022 103  98 - 107 Final    Carbon Dioxide 02/23/2022 28  23 - 31 Final    Glucose Level 02/23/2022 97  82 - 115 Final    Blood Urea Nitrogen 02/23/2022 21.7  8.4 - 25.7 Final    Creatinine 02/23/2022 0.87  0.73 - 1.18 Final    Calcium Level Total 02/23/2022 9.3  8.7 - 10.5 Final    Albumin Level 02/23/2022 4.0  3.4 - 4.8 Final    Protein Total 02/23/2022 6.5  5.8 - 7.6 Final    Globulin 02/23/2022 2.5  2.4 - 3.5 Final    Albumin/Globulin Ratio 02/23/2022 1.6  1.1 - 2.0 Final    Alkaline Phosphatase 02/23/2022 28  40 - 150 Final    Bilirubin Total 02/23/2022 0.5  <=1.5 Final    Bilirubin Direct 02/23/2022 0.2  0.0 - 0.5 Final    Bilirubin Indirect 02/23/2022 0.30  0.00 - 0.80 Final    Aspartate Aminotransferase 02/23/2022 19  5 - 34 Final    Alanine Aminotransferase 02/23/2022 15  0 - 55 Final    Hemolysis 02/23/2022 1   Final    Icterus 02/23/2022 1   Final    Lipemia 02/23/2022 <0   Final       Past Medical History:  Past Medical History:   Diagnosis Date    Gout 6/6/2022    Hypertension 6/6/2022    Hypothyroidism 6/6/2022    Sleep apnea 6/6/2022    uses CPAP uses CPAP    Type 2 diabetes mellitus with diabetic polyneuropathy, without long-term current use of insulin 6/6/2022     Past Surgical History:   Procedure Laterality Date    CORONARY ANGIOPLASTY WITH STENT PLACEMENT N/A     Excision of Skin Cancer (Arm)  N/A     Right Knee Arthroscopy N/A     VASECTOMY N/A      Review of patient's allergies indicates:  No Known Allergies  Current Outpatient Medications on File Prior to Visit   Medication Sig Dispense Refill    allopurinoL (ZYLOPRIM) 300 MG tablet Take 300 mg by mouth once daily.      amLODIPine (NORVASC) 5 MG tablet   See Instructions, TAKE TWO TABLETS BY MOUTH EVERY DAY, # 60 tab(s), 11 Refill(s), Pharmacy: [id number], 175, cm, Height/Length Dosing, 12/10/21 6:00:00 CST, 100, kg, Weight  Dosing, 12/10/21 6:00:00 CST      aspirin 81 mg Cap       calcitonin, salmon, (FORTICAL) 200 unit/actuation nasal spray 1 spray once daily.      cyanocobalamin, vitamin B-12, 50 mcg tablet Take 50 mcg by mouth.      suptjxqyswhg-IV-gnfywwxssyt-GG (SINUS RELIEF MAX STR DAY-NIGHT) 5-325 mg(d)/ 12.5-5-325mg(n) TbSQ       fenofibrate 160 MG Tab Take 160 mg by mouth once daily.      fluticasone propionate (FLONASE) 50 mcg/actuation nasal spray       gabapentin (NEURONTIN) 300 MG capsule Take 1 capsule (300 mg total) by mouth 3 (three) times daily. Prn left leg pain 90 capsule 0    glimepiride (AMARYL) 2 MG tablet       glucosamine-chondroitin 250-200 mg Tab       iron-vit c-b12-folic acid (IRON 100 PLUS) Tab       levothyroxine 137 mcg/mL Soln       levothyroxine 175 mcg Cap Take 175 mcg by mouth.      lisinopriL 10 MG tablet Take 10 mg by mouth.      metFORMIN (GLUCOPHAGE) 500 MG tablet TAKE ONE TABLET BY MOUTH TWICE A DAY 60 tablet 12    omeprazole (PRILOSEC) 40 MG capsule TAKE ONE CAPSULE BY MOUTH EVERY DAY 90 capsule 0    sildenafiL (VIAGRA) 50 MG tablet Take 50 mg by mouth.      terbinafine HCL (LAMISIL) 250 mg tablet       traZODone (DESYREL) 50 MG tablet   See Instructions, TAKE ONE TABLET BY MOUTH AT BEDTIME, # 90 tab(s), 3 Refill(s), Pharmacy: [facility] [id number] [id number], 175, cm, Height/Length Dosing, 08/24/21 8:43:00 CDT, 96.2, kg, Weight Dosing, 08/24/21 8:43:00 CDT      vitamin D3-folic acid 125 mcg (5,000 unit)-1 mg Tab        No current facility-administered medications on file prior to visit.     Social History     Socioeconomic History    Marital status:    Tobacco Use    Smoking status: Never Smoker    Smokeless tobacco: Never Used   Substance and Sexual Activity    Alcohol use: Never    Drug use: Never    Sexual activity: Yes     History reviewed. No pertinent family history.    Review of Systems  Review of Systems   Constitutional: Negative.    HENT: Negative.    Eyes:  "Negative.    Respiratory: Negative.    Cardiovascular: Negative.    Gastrointestinal: Negative.    Genitourinary: Negative.    Musculoskeletal: Negative.    Neurological: Negative.    Endo/Heme/Allergies: Negative.    Psychiatric/Behavioral: Negative.    Objective:   /80   Pulse 63   Ht 5' 9" (1.753 m)   Wt 102.5 kg (226 lb)   SpO2 96%   BMI 33.37 kg/m²         Physical Exam  Vitals and nursing note reviewed.   Constitutional:       General: He is not in acute distress.     Appearance: Normal appearance.   HENT:      Head: Normocephalic and atraumatic.      Right Ear: Tympanic membrane and ear canal normal.      Left Ear: Tympanic membrane and ear canal normal.      Nose: Nose normal.      Mouth/Throat:      Pharynx: Oropharynx is clear. No oropharyngeal exudate or posterior oropharyngeal erythema.   Eyes:      Extraocular Movements: Extraocular movements intact.      Pupils: Pupils are equal, round, and reactive to light.   Cardiovascular:      Rate and Rhythm: Normal rate and regular rhythm.      Pulses: Normal pulses.      Heart sounds: Normal heart sounds. No murmur heard.    No friction rub. No gallop.   Pulmonary:      Effort: Pulmonary effort is normal. No respiratory distress.      Breath sounds: Normal breath sounds.   Abdominal:      General: Abdomen is flat. Bowel sounds are normal.      Palpations: Abdomen is soft.   Genitourinary:     Rectum: Normal.   Musculoskeletal:         General: Normal range of motion.      Cervical back: Normal range of motion and neck supple.   Skin:     General: Skin is warm.      Capillary Refill: Capillary refill takes less than 2 seconds.   Neurological:      General:  Sciatica involving the lower lumbar nerve roots on the left.       Mental Status: He is alert and oriented to person, place, and time.   Psychiatric:         Mood and Affect: Mood normal.         Behavior: Behavior normal.         Thought Content: Thought content normal.         Judgment: Judgment " normal.   Assessment:     1. Musculoskeletal leg pain, left    2. Sciatic nerve pain, unspecified laterality      Plan:   I am having Darwin Fletcher start on ketorolac and methylPREDNISolone. I am also having him maintain his omeprazole, metFORMIN, allopurinoL, amLODIPine, aspirin, SINUS RELIEF MAX STR DAY-NIGHT, fenofibrate, levothyroxine, lisinopriL, traZODone, gabapentin, calcitonin (salmon), cyanocobalamin (vitamin B-12), fluticasone propionate, glimepiride, glucosamine-chondroitin, IRON 100 PLUS, sildenafiL, terbinafine HCL, vitamin D3-folic acid, and levothyroxine.  Clinically, I have gone over sciatica with this patient, following his examination.  He will be started on a combination of Toradol and corticosteroids, in an effort to resolve this problem.  If we do not see an objective improvement, x-rays of the lumbar spine will be obtained.  Problem List Items Addressed This Visit    None     Visit Diagnoses     Musculoskeletal leg pain, left    -  Primary    Relevant Medications    ketorolac (TORADOL) 10 mg tablet    methylPREDNISolone (MEDROL DOSEPACK) 4 mg tablet    Sciatic nerve pain, unspecified laterality              Darwin was seen today for leg pain.    Diagnoses and all orders for this visit:    Musculoskeletal leg pain, left  -     ketorolac (TORADOL) 10 mg tablet; Take 1 tablet (10 mg total) by mouth 3 (three) times daily. for 5 days  -     methylPREDNISolone (MEDROL DOSEPACK) 4 mg tablet; use as directed    Sciatic nerve pain, unspecified laterality

## 2022-07-18 DIAGNOSIS — U07.1 COVID: Primary | ICD-10-CM

## 2022-07-18 RX ORDER — PREDNISONE 10 MG/1
10 TABLET ORAL 2 TIMES DAILY
Qty: 14 TABLET | Refills: 0 | Status: SHIPPED | OUTPATIENT
Start: 2022-07-18 | End: 2022-07-25

## 2022-07-18 NOTE — TELEPHONE ENCOUNTER
Advised prednisone and paxlovid will be sent out at this time, also to take sinus pe otc  ----- Message from Velia Mckeon, Patient Care Assistant sent at 7/18/2022  8:51 AM CDT -----  Regarding: advice  Type:  Needs Medical Advice    Who Called: pt  Symptoms (please be specific): chest congestion, sob, no fever but did have chills, dizziness   How long has patient had these symptoms: 7/14/22 tested positive this morning 7/18/22  Pharmacy name and phone #:  [facility] in [place] on [address]  Would the patient rather a call back or a response via [facility]? C/b  Best Call Back Number: [phone]  Additional Information: pt is req something for his symptoms wife is wondering about the antiviral medicine and monoclonal antibodies

## 2022-07-20 ENCOUNTER — HOSPITAL ENCOUNTER (OUTPATIENT)
Dept: RADIOLOGY | Facility: HOSPITAL | Age: 72
Discharge: HOME | End: 2022-07-20
Attending: INTERNAL MEDICINE
Payer: MEDICARE

## 2022-07-20 DIAGNOSIS — U07.1 COVID: ICD-10-CM

## 2022-07-20 PROCEDURE — 71046 X-RAY EXAM CHEST 2 VIEWS: CPT | Mod: TC

## 2022-08-14 RX ORDER — OMEPRAZOLE 40 MG/1
CAPSULE, DELAYED RELEASE ORAL
Qty: 90 CAPSULE | Refills: 0 | Status: SHIPPED | OUTPATIENT
Start: 2022-08-14 | End: 2022-11-30 | Stop reason: SDUPTHER

## 2022-09-08 ENCOUNTER — LAB VISIT (OUTPATIENT)
Dept: LAB | Facility: HOSPITAL | Age: 72
End: 2022-09-08
Attending: INTERNAL MEDICINE
Payer: MEDICARE

## 2022-09-08 DIAGNOSIS — Z12.5 SCREENING FOR PROSTATE CANCER: ICD-10-CM

## 2022-09-08 DIAGNOSIS — I10 HYPERTENSION, UNSPECIFIED TYPE: ICD-10-CM

## 2022-09-08 DIAGNOSIS — Z00.00 WELL ADULT EXAM: ICD-10-CM

## 2022-09-08 DIAGNOSIS — E11.9 DM (DIABETES MELLITUS): Primary | ICD-10-CM

## 2022-09-08 DIAGNOSIS — E03.9 HYPOTHYROIDISM, UNSPECIFIED TYPE: ICD-10-CM

## 2022-09-08 LAB
ALBUMIN SERPL-MCNC: 4.1 GM/DL (ref 3.4–4.8)
ALBUMIN/GLOB SERPL: 1.6 RATIO (ref 1.1–2)
ALP SERPL-CCNC: 28 UNIT/L (ref 40–150)
ALT SERPL-CCNC: 16 UNIT/L (ref 0–55)
AST SERPL-CCNC: 18 UNIT/L (ref 5–34)
BASOPHILS # BLD AUTO: 0.04 X10(3)/MCL (ref 0–0.2)
BASOPHILS NFR BLD AUTO: 0.7 %
BILIRUBIN DIRECT+TOT PNL SERPL-MCNC: 0.7 MG/DL
BUN SERPL-MCNC: 23.9 MG/DL (ref 8.4–25.7)
CALCIUM SERPL-MCNC: 10.1 MG/DL (ref 8.8–10)
CHLORIDE SERPL-SCNC: 106 MMOL/L (ref 98–107)
CHOLEST SERPL-MCNC: 141 MG/DL
CHOLEST/HDLC SERPL: 3 {RATIO} (ref 0–5)
CO2 SERPL-SCNC: 28 MMOL/L (ref 23–31)
CREAT SERPL-MCNC: 0.9 MG/DL (ref 0.73–1.18)
CREAT UR-MCNC: 152.4 MG/DL (ref 63–166)
EOSINOPHIL # BLD AUTO: 0.29 X10(3)/MCL (ref 0–0.9)
EOSINOPHIL NFR BLD AUTO: 4.8 %
ERYTHROCYTE [DISTWIDTH] IN BLOOD BY AUTOMATED COUNT: 13.2 % (ref 11.5–17)
EST. AVERAGE GLUCOSE BLD GHB EST-MCNC: 93.9 MG/DL
GFR SERPLBLD CREATININE-BSD FMLA CKD-EPI: >60 MLS/MIN/1.73/M2
GLOBULIN SER-MCNC: 2.5 GM/DL (ref 2.4–3.5)
GLUCOSE SERPL-MCNC: 132 MG/DL (ref 82–115)
HBA1C MFR BLD: 4.9 %
HCT VFR BLD AUTO: 40.5 % (ref 42–52)
HDLC SERPL-MCNC: 41 MG/DL (ref 35–60)
HGB BLD-MCNC: 12.9 GM/DL (ref 14–18)
IMM GRANULOCYTES # BLD AUTO: 0.06 X10(3)/MCL (ref 0–0.04)
IMM GRANULOCYTES NFR BLD AUTO: 1 %
LDLC SERPL CALC-MCNC: 87 MG/DL (ref 50–140)
LYMPHOCYTES # BLD AUTO: 1.19 X10(3)/MCL (ref 0.6–4.6)
LYMPHOCYTES NFR BLD AUTO: 19.7 %
MCH RBC QN AUTO: 30.6 PG (ref 27–31)
MCHC RBC AUTO-ENTMCNC: 31.9 MG/DL (ref 33–36)
MCV RBC AUTO: 96.2 FL (ref 80–94)
MICROALBUMIN UR-MCNC: 13.3 UG/ML
MICROALBUMIN/CREAT RATIO PNL UR: 8.7 MG/GM CR (ref 0–30)
MONOCYTES # BLD AUTO: 0.45 X10(3)/MCL (ref 0.1–1.3)
MONOCYTES NFR BLD AUTO: 7.5 %
NEUTROPHILS # BLD AUTO: 4 X10(3)/MCL (ref 2.1–9.2)
NEUTROPHILS NFR BLD AUTO: 66.3 %
NRBC BLD AUTO-RTO: 0 %
PLATELET # BLD AUTO: 183 X10(3)/MCL (ref 130–400)
PMV BLD AUTO: 10.1 FL (ref 7.4–10.4)
POTASSIUM SERPL-SCNC: 4.2 MMOL/L (ref 3.5–5.1)
PROT SERPL-MCNC: 6.6 GM/DL (ref 5.8–7.6)
PSA SERPL-MCNC: 2.79 NG/ML
RBC # BLD AUTO: 4.21 X10(6)/MCL (ref 4.7–6.1)
SODIUM SERPL-SCNC: 139 MMOL/L (ref 136–145)
TRIGL SERPL-MCNC: 63 MG/DL (ref 34–140)
TSH SERPL-ACNC: 2.64 UIU/ML (ref 0.35–4.94)
VLDLC SERPL CALC-MCNC: 13 MG/DL
WBC # SPEC AUTO: 6 X10(3)/MCL (ref 4.5–11.5)

## 2022-09-08 PROCEDURE — 82043 UR ALBUMIN QUANTITATIVE: CPT

## 2022-09-08 PROCEDURE — 84443 ASSAY THYROID STIM HORMONE: CPT

## 2022-09-08 PROCEDURE — 80053 COMPREHEN METABOLIC PANEL: CPT

## 2022-09-08 PROCEDURE — 80061 LIPID PANEL: CPT

## 2022-09-08 PROCEDURE — 85025 COMPLETE CBC W/AUTO DIFF WBC: CPT

## 2022-09-08 PROCEDURE — 83036 HEMOGLOBIN GLYCOSYLATED A1C: CPT

## 2022-09-08 PROCEDURE — 84153 ASSAY OF PSA TOTAL: CPT

## 2022-09-08 PROCEDURE — 36415 COLL VENOUS BLD VENIPUNCTURE: CPT

## 2022-09-10 ENCOUNTER — PATIENT MESSAGE (OUTPATIENT)
Dept: INTERNAL MEDICINE | Facility: CLINIC | Age: 72
End: 2022-09-10
Payer: MEDICARE

## 2022-09-12 DIAGNOSIS — E11.42 TYPE 2 DIABETES MELLITUS WITH DIABETIC POLYNEUROPATHY, WITHOUT LONG-TERM CURRENT USE OF INSULIN: Primary | Chronic | ICD-10-CM

## 2022-09-12 RX ORDER — METFORMIN HYDROCHLORIDE 500 MG/1
TABLET ORAL
Qty: 30 TABLET | Refills: 12 | Status: SHIPPED | OUTPATIENT
Start: 2022-09-12 | End: 2023-09-19 | Stop reason: SDUPTHER

## 2022-09-12 RX ORDER — GLIMEPIRIDE 2 MG/1
1 TABLET ORAL 2 TIMES DAILY
Qty: 30 TABLET | Refills: 11 | Status: SHIPPED | OUTPATIENT
Start: 2022-09-12 | End: 2023-12-11

## 2022-09-13 ENCOUNTER — OFFICE VISIT (OUTPATIENT)
Dept: INTERNAL MEDICINE | Facility: CLINIC | Age: 72
End: 2022-09-13
Payer: MEDICARE

## 2022-09-13 VITALS — OXYGEN SATURATION: 96 % | HEIGHT: 69 IN | WEIGHT: 230 LBS | HEART RATE: 68 BPM | BODY MASS INDEX: 34.07 KG/M2

## 2022-09-13 DIAGNOSIS — E03.9 HYPOTHYROIDISM, UNSPECIFIED TYPE: Chronic | ICD-10-CM

## 2022-09-13 DIAGNOSIS — I10 HYPERTENSION, UNSPECIFIED TYPE: Chronic | ICD-10-CM

## 2022-09-13 DIAGNOSIS — M25.569 KNEE PAIN, UNSPECIFIED CHRONICITY, UNSPECIFIED LATERALITY: ICD-10-CM

## 2022-09-13 DIAGNOSIS — E11.42 TYPE 2 DIABETES MELLITUS WITH DIABETIC POLYNEUROPATHY, WITHOUT LONG-TERM CURRENT USE OF INSULIN: ICD-10-CM

## 2022-09-13 DIAGNOSIS — Z00.00 WELL ADULT EXAM: Primary | ICD-10-CM

## 2022-09-13 DIAGNOSIS — M54.30 SCIATIC NERVE PAIN, UNSPECIFIED LATERALITY: ICD-10-CM

## 2022-09-13 DIAGNOSIS — M54.9 DORSALGIA, UNSPECIFIED: ICD-10-CM

## 2022-09-13 DIAGNOSIS — Z23 NEED FOR TDAP VACCINATION: ICD-10-CM

## 2022-09-13 DIAGNOSIS — M10.9 GOUT, UNSPECIFIED CAUSE, UNSPECIFIED CHRONICITY, UNSPECIFIED SITE: Chronic | ICD-10-CM

## 2022-09-13 DIAGNOSIS — M54.16 LUMBAR RADICULOPATHY, CHRONIC: ICD-10-CM

## 2022-09-13 PROCEDURE — G0438 PPPS, INITIAL VISIT: HCPCS | Mod: ,,, | Performed by: INTERNAL MEDICINE

## 2022-09-13 PROCEDURE — G0438 PR WELCOME MEDICARE ANNUAL WELLNESS INITIAL VISIT: ICD-10-PCS | Mod: ,,, | Performed by: INTERNAL MEDICINE

## 2022-09-13 RX ORDER — DICLOFENAC SODIUM 75 MG/1
75 TABLET, DELAYED RELEASE ORAL 2 TIMES DAILY
Qty: 28 TABLET | Refills: 1 | Status: SHIPPED | OUTPATIENT
Start: 2022-09-13 | End: 2022-09-27

## 2022-09-13 NOTE — PROGRESS NOTES
Patient ID: [id number]     Chief Complaint: Medicare AWV      HPI:     Darwin Fletcher is a 71 y.o. male here today for a Medicare Wellness. This patient is an established patient. His active problem list and current medication will be reviewed at the time of this visit. This patient's medical illnesses have been well recognized and documented in his chart. A review of systems will be taken at the time of his visit, and any new information necessary for care will be documented. This patient has done well since his last visit to the office.  He has been compliant in taking medication, and refilling his medication on a regular schedule. He had laboratory studies drawn prior to this office visit, and I took the liberty to review these results with him in detail. I have given him a hand written explanation of the results for his records.  This patient has medical problems that have a well recognized in the past, and treated.  Within the last 2 years, he has had COVID at least twice, with the 1st infection resulting in a hospital stay of 5-7 days.  He has done well, and he tries to maintain an active lifestyle, but he is burdened by knee pain involving his left knee, and lower back pain, for which no x-rays have been taken in the past.  He has come in today for a wellness examination.  He is compliant in taking medication.  He tries to exercise, but he is limited because of back and knee pain.  He has not had any side effects from the medication that he chronically takes.          Opioid Screening: Patient medication list reviewed, patient is not taking prescription opioids. Patient is not using additional opioids than prescribed. Patient is at low risk of substance abuse based on this opioid use history.       ----------------------------  Gout  Hypertension  Hypothyroidism  Sleep apnea      Comment:  uses CPAP uses CPAP  Type 2 diabetes mellitus with diabetic polyneuropathy, without long-  term current use of insulin      Past Surgical History:   Procedure Laterality Date    CHOLECYSTECTOMY      CORONARY ANGIOPLASTY WITH STENT PLACEMENT N/A     Excision of Skin Cancer (Arm)  N/A     EYE SURGERY      Right Knee Arthroscopy N/A     VASECTOMY N/A        Review of patient's allergies indicates:  No Known Allergies    Outpatient Medications Marked as Taking for the 9/13/22 encounter (Office Visit) with Kana Smith Jr., MD   Medication Sig Dispense Refill    allopurinoL (ZYLOPRIM) 300 MG tablet Take 300 mg by mouth once daily.      amLODIPine (NORVASC) 5 MG tablet   See Instructions, TAKE TWO TABLETS BY MOUTH EVERY DAY, # 60 tab(s), 11 Refill(s), Pharmacy: [id number], 175, cm, Height/Length Dosing, 12/10/21 6:00:00 CST, 100, kg, Weight Dosing, 12/10/21 6:00:00 CST      aspirin 81 mg Cap       fenofibrate 160 MG Tab Take 160 mg by mouth once daily.      glimepiride (AMARYL) 2 MG tablet Take 0.5 tablets (1 mg total) by mouth 2 (two) times a day. 30 tablet 11    glucosamine-chondroitin 250-200 mg Tab       iron-vit c-b12-folic acid (IRON 100 PLUS) Tab       levothyroxine (SYNTHROID, LEVOTHROID) 175 MCG tablet TAKE ONE TABLET BY MOUTH EVERY DAY 30 tablet 6    lisinopriL 10 MG tablet Take 10 mg by mouth.      metFORMIN (GLUCOPHAGE) 500 MG tablet Take 1/2 tablet bid 30 tablet 12    omeprazole (PRILOSEC) 40 MG capsule TAKE ONE CAPSULE BY MOUTH EVERY DAY 90 capsule 0    traZODone (DESYREL) 50 MG tablet   See Instructions, TAKE ONE TABLET BY MOUTH AT BEDTIME, # 90 tab(s), 3 Refill(s), Pharmacy: [id number], 175, cm, Height/Length Dosing, 08/24/21 8:43:00 CDT, 96.2, kg, Weight Dosing, 08/24/21 8:43:00 CDT         Social History     Socioeconomic History    Marital status:    Tobacco Use    Smoking status: Former     Packs/day: 2.00     Years: 35.00     Pack years: 70.00     Types: Cigarettes    Smokeless tobacco: Never   Substance and Sexual Activity    Alcohol use: Yes     Alcohol/week: 9.0 standard drinks      Types: 2 Glasses of wine, 2 Cans of beer, 3 Shots of liquor, 2 Drinks containing 0.5 oz of alcohol per week    Drug use: Never    Sexual activity: Yes        History reviewed. No pertinent family history.     Patient Care Team:  Kana Smith Jr., MD as PCP - General (Internal Medicine)  Kana Smith Jr., MD       Subjective:     Review of Systems   Constitutional: Negative.    HENT: Negative.     Eyes: Negative.    Respiratory: Negative.     Cardiovascular: Negative.    Gastrointestinal: Negative.    Genitourinary: Negative.    Musculoskeletal: Negative.    Skin: Negative.    Neurological: Negative.    Endo/Heme/Allergies: Negative.    Psychiatric/Behavioral: Negative.     All other systems reviewed and are negative.      Patient Reported Health Risk Assessment  [age]  Are you male or female?: Male  During the past four weeks, how much have you been bothered by emotional problems such as feeling anxious, depressed, irritable, sad, or downhearted and blue?: Not at all  During the past five weeks, has your physical and/or emotional health limited your social activities with family, friends, neighbors, or groups?: Not at all  During the past four weeks, how much bodily pain have you generally had?: Mild pain  During the past four weeks, was someone available to help if you needed and wanted help?: Yes, as much as I wanted  During the past four weeks, what was the hardest physical activity you could do for at least two minutes?: Moderate  Can you get to places out of walking distance without help?  (For example, can you travel alone on buses or taxis, or drive your own car?): Yes  Can you go shopping for groceries or clothes without someone's help?: Yes  Can you prepare your own meals?: Yes  Can you do your own housework without help?: Yes  Because of any health problems, do you need the help of another person with your personal care needs such as eating, bathing, dressing, or getting  "around the house?: No  Can you handle your own money without help?: Yes  During the past four weeks, how would you rate your health in general?: Excellent  How have things been going for you during the past four weeks?: Pretty well  Are you having difficulties driving your car?: No  Do you always fasten your seat belt when you are in a car?: Yes, usually  How often in the past four weeks have you been bothered by falling or dizzy when standing up?: Never  How often in the past four weeks have you been bothered by sexual problems?: Never  How often in the past four weeks have you been bothered by trouble eating well?: Never  How often in the past four weeks have you been bothered by teeth or denture problems?: Never  How often in the past four weeks have you been bothered with problems using the telephone?: Never  How often in the past four weeks have you been bothered by tiredness or fatigue?: Never  Have you fallen two or more times in the past year?: No  Are you afraid of falling?: No  Are you a smoker?: No  During the past four weeks, how many drinks of wine, beer, or other alcoholic beverages did you have?: No alcohol at all  Do you exercise for about 20 minutes three or more days a week?: Yes, some of the time  Have you been given any information to help you with hazards in your house that might hurt you?: Yes  Have you been given any information to help you with keeping track of your medications?: Yes  How often do you have trouble taking medicines the way you've been told to take them?: I always take them as prescribed  How confident are you that you can control and manage most of your health problems?: Very confident  What is your race? (Check all that apply.):     Objective:     Pulse 68   Ht 5' 9" (1.753 m)   Wt 104.3 kg (230 lb)   SpO2 96%   BMI 33.97 kg/m²     Physical Exam  Vitals and nursing note reviewed.   Constitutional:       Appearance: Normal appearance. He is normal weight. "   Cardiovascular:      Rate and Rhythm: Normal rate and regular rhythm.      Pulses: Normal pulses.      Heart sounds: Normal heart sounds.   Pulmonary:      Effort: Pulmonary effort is normal.      Breath sounds: Normal breath sounds.   Abdominal:      General: Abdomen is flat. Bowel sounds are normal.      Palpations: Abdomen is soft.   Musculoskeletal:         General: Normal range of motion.      Cervical back: Normal range of motion.   Skin:     General: Skin is warm.   Neurological:      General: No focal deficit present.      Mental Status: He is alert and oriented to person, place, and time.   Psychiatric:         Mood and Affect: Mood normal.         Behavior: Behavior normal.         Thought Content: Thought content normal.         Judgment: Judgment normal.       No flowsheet data found.  Fall Risk Assessment - Outpatient 9/13/2022 6/6/2022   Mobility Status Ambulatory Ambulatory   Number of falls 0 0   Identified as fall risk 0 0           Depression Screening  Over the past two weeks, has the patient felt down, depressed, or hopeless?: No  Over the past two weeks, has the patient felt little interest or pleasure in doing things?: No  Functional Ability/Safety Screening  Was the patient's timed Up & Go test unsteady or longer than 30 seconds?: No  Does the patient need help with phone, transportation, shopping, preparing meals, housework, laundry, meds, or managing money?: No  Does the patient's home have rugs in the hallway, lack grab bars in the bathroom, lack handrails on the stairs or have poor lighting?: No  Have you noticed any hearing difficulties?: No  Cognitive Function (Assessed through direct observation with due consideration of information obtained by way of patient reports and/or concerns raised by family, friends, caretakers, or others)    Does the patient repeat questions/statements in the same day?: No  Does the patient have trouble remembering the date, year, and time?: No  Does the  patient have difficulty managing finances?: No  Does the patient have a decreased sense of direction?: No  Assessment/Plan:     1. Well adult exam    2. Type 2 diabetes mellitus with diabetic polyneuropathy, without long-term current use of insulin    3. Sciatic nerve pain, unspecified laterality    4. Hypertension, unspecified type    5. Hypothyroidism, unspecified type    6. Gout, unspecified cause, unspecified chronicity, unspecified site    7. Need for Tdap vaccination    8. Dorsalgia, unspecified  -     CT Lumbar Spine Without Contrast    9. Lumbar radiculopathy, chronic  -     CT Lumbar Spine Without Contrast     This patient is an established patient who has come in for an examination. He has done well since his last visit to the office. He has a negative review of systems, except as recorded above. He has not had any new problems to develop in the recent past. I was able to review his laboratory studies with him, as mentioned in the history of present illness. I will make medication changes as needed, and his follow-up will continue as before.  Fortunately, he will not need to make any medication changes from which he is now own, but I will give him a prescription of diclofenac to take 75 mg b.i.d. for at least 2 weeks, with 1 refill.  X-rays of his lower back will be done, and I will order a CT of his lower back, along with x-rays of both knees.  He will probably need an orthopedic referral for the latter problems.    This patient should continue to take all medication chronically given for known medical illnesses.    I discussed blood pressure management strategies with the patient today. I also recommend a low salt and low pork diet. We discussed antihypertensive medication. I have stressed an increase in physical activity and exercise.    I have made recommendations regarding better glycemic control with this patient today. The goal is to keep the blood sugar under a hemoglobin A1c of 7. Diet,  medication, an increase in exercise or physical activity has been stressed.    Exercise is defined as 30 minutes of sustained activity performed at least 3 or 4 days each week.    The health maintenance protocol has been reviewed and updated, and may include a diabetic foot inspection.    30 minutes were needed to perform an H and P, and to review this patient's test that were taken. It also required an additional 10 minutes to complete this electronic health record, which totaled 40 minutes of time and spent with the patient.    Continue physical therapy if possible , and I will review the x-rays of both knees and of his lower back, when they become available.        Medicare Annual Wellness and Personalized Prevention Plan:   Fall Risk + Home Safety + Hearing Impairment + Depression Screen + Opioid and Substance Abuse Screening + Cognitive Impairment Screen + Health Risk Assessment all reviewed.     Health Maintenance Topics with due status: Not Due       Topic Last Completion Date    Colorectal Cancer Screening 07/27/2020    Lipid Panel 09/08/2022      The patient's Health Maintenance was reviewed and the following appears to be due at this time:   Health Maintenance Due   Topic Date Due    Hepatitis C Screening  Never done    TETANUS VACCINE  Never done    High Dose Statin  Never done    Shingles Vaccine (2 of 2) 12/23/2020    Influenza Vaccine (1) 09/01/2022       Advance Care Planning   I attest to discussing Advance Care Planning with patient and/or family member.  Education was provided including the importance of the Health Care Power of , Advance Directives, and/or [place] documentation.  The patient expressed understanding to the importance of this information and discussion.         No follow-ups on file. In addition to their scheduled follow up, the patient has also been instructed to follow up on as needed basis.

## 2022-09-14 ENCOUNTER — DOCUMENTATION ONLY (OUTPATIENT)
Dept: ADMINISTRATIVE | Facility: HOSPITAL | Age: 72
End: 2022-09-14
Payer: MEDICARE

## 2022-09-15 ENCOUNTER — DOCUMENTATION ONLY (OUTPATIENT)
Dept: ADMINISTRATIVE | Facility: HOSPITAL | Age: 72
End: 2022-09-15
Payer: MEDICARE

## 2022-09-26 LAB
LEFT EYE DM RETINOPATHY: NEGATIVE
RIGHT EYE DM RETINOPATHY: NEGATIVE

## 2022-09-27 ENCOUNTER — HOSPITAL ENCOUNTER (OUTPATIENT)
Dept: RADIOLOGY | Facility: HOSPITAL | Age: 72
Discharge: HOME | End: 2022-09-27
Attending: INTERNAL MEDICINE
Payer: MEDICARE

## 2022-09-27 DIAGNOSIS — M54.9 DORSALGIA, UNSPECIFIED: ICD-10-CM

## 2022-09-27 DIAGNOSIS — M25.569 KNEE PAIN, UNSPECIFIED CHRONICITY, UNSPECIFIED LATERALITY: ICD-10-CM

## 2022-09-27 DIAGNOSIS — M54.16 LUMBAR RADICULOPATHY, CHRONIC: ICD-10-CM

## 2022-09-27 PROCEDURE — 73560 X-RAY EXAM OF KNEE 1 OR 2: CPT | Mod: TC,50

## 2022-09-27 PROCEDURE — 72131 CT LUMBAR SPINE W/O DYE: CPT | Mod: TC

## 2022-09-28 ENCOUNTER — TELEPHONE (OUTPATIENT)
Dept: INTERNAL MEDICINE | Facility: CLINIC | Age: 72
End: 2022-09-28
Payer: MEDICARE

## 2022-09-28 DIAGNOSIS — M54.30 SCIATIC NERVE PAIN, UNSPECIFIED LATERALITY: Primary | ICD-10-CM

## 2022-09-28 NOTE — TELEPHONE ENCOUNTER
Dr. Smith spoke to patient we will send a referral to Dr. Archibald.     ----- Message from Anali Abreu sent at 9/28/2022  1:37 PM CDT -----  [phone]  Mr Granados is calling for results of CT and X-ray he recently did

## 2022-09-29 ENCOUNTER — TELEPHONE (OUTPATIENT)
Dept: INTERNAL MEDICINE | Facility: CLINIC | Age: 72
End: 2022-09-29
Payer: MEDICARE

## 2022-09-29 NOTE — TELEPHONE ENCOUNTER
Sent to Dr. Chandler    ----- Message from Heena May sent at 9/29/2022  9:14 AM CDT -----  Regarding: dani Tinoco/Dr. Cheng office called to let you know their office is not currently seeing new patients.  [phone]

## 2022-10-05 NOTE — PROGRESS NOTES
Dictation #1  MRN:[id number]  CSN:[id number] Subjective:      Patient ID: Darwin Fletcher is a 71 y.o. male.    Chief Complaint: Back Pain (Sciatic nerve pain. States pain is 8/10. Pain worsen when sitting down. States can't walk as long as he used to. )    Referred by: Kana Smith Jr.*     HPI  New consult low back pain, symptoms persist with > 6wks conservative treatment. 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 Pain curently limits his ability  to walk and has but on weight. Pain intensified w/prolonged sitting and wakes him up at night. Pain originates from bilateral buttock and radiates on left  to back of thigh and crosses over to anterior left shin. Right buttock pain locals w/out radiation    Pain is sharp. Symptoms persist with conservative treatment. PT completed and  helped a lot until pain started worsening the sciatica and made pain unbearable. No interventional pain procedures.  OTC meds trialed ASA Tylenol, Gabapentin, trialed Tizanidine. None of these meds helped his pain.Pertinent PMH DMII (controlled). Bunion removal left foot.       CT Lumbar Spine: 09/2022:  FINDINGS:  There are 5 non-rib-bearing lumbar type vertebral bodies.  There is a leftward curvature of the lumbar spine centered at L3.  There is grade 1 retrolisthesis of L2, L3 and L4.  The vertebral heights are maintained.  There is no acute fracture identified.     There are multilevel degenerative changes with disc height loss, marginal osteophyte formation and facet arthropathy.  There is multilevel degenerative narrowing of the spinal canal, severe at L2-L3 and L3-L4, moderate at L4-L5.  There are multilevel neural foraminal stenoses, severe on the left at L4-L5, moderate on the right at L2-L5 and on the left at L2-L4 and L5-S1.     The paraspinal soft tissues are unremarkable.     Impression:  1. No acute fracture identified.  2. Multilevel degenerative changes  of the lumbar spine.    Pertinent Labs 2022:  A1c 4.9  Crea/GFR normal          ROS      As noted in HPI  Objective:          Physical Exam  General: Well developed; overweight; A&O x 3; No anxiety/depression; NAD  Mental Status: Oriented to person, palce and time. Displays appropriate mood & affect.  Head: Norm cephalic and atraumatic  Neck: Midline trachea  Eyes: Eyes midline with conjugate gaze. PERRLA  ENT and mouth: normal external ear, nose, and no lesions noted on the lips.  Respiratory: Symmetrical, Unlabored  Abdomen: Non-distended + bowel sounds    Extremities:  Gen: No cyanosis or tenderness to palpation bilateral LE  Skin: Warm, pink, dry, no rashes, no lesions on the lumbar spine  Strength: 5/5 motor strength bilateral LE  ROM: Full ROM in bilateral knees . Slight instability to left knee. Bilateral Ankles without pain or instability.    Neuro:  Gait: Independent Ambulator. No altalgic lean, normal toe and heel raise  DTR's: 2+ in bilateral patellar, and ankle  Sensory: Intact to light touch bilateral lower extremities    Spine: +tenderness to palpation over bilateral buttocks L>R.     L-spine ROM: Full ROM to flexion, extension, bilateral rotation normal.  Straight Leg Raise: normal bilaterally.              Assessment:       Encounter Diagnoses   Name Primary?    Dorsalgia Yes    Lumbar radiculopathy     Sciatic nerve pain, unspecified laterality          Plan:       Darwin was seen today for back pain.    Diagnoses and all orders for this visit:    Dorsalgia    Lumbar radiculopathy    Sciatic nerve pain, unspecified laterality  -     Ambulatory referral/consult to Anesthesiology       Left MBB L4/L5  Hold ASA for one week prior to proceure  Follow up after MBB  Patient instructions printed.   CC send to Dr. Sarah Hamilton

## 2022-10-06 ENCOUNTER — OFFICE VISIT (OUTPATIENT)
Dept: ORTHOPEDICS | Facility: CLINIC | Age: 72
End: 2022-10-06
Payer: MEDICARE

## 2022-10-06 VITALS
DIASTOLIC BLOOD PRESSURE: 83 MMHG | BODY MASS INDEX: 34.07 KG/M2 | HEIGHT: 69 IN | WEIGHT: 230 LBS | HEART RATE: 63 BPM | SYSTOLIC BLOOD PRESSURE: 157 MMHG

## 2022-10-06 DIAGNOSIS — M47.816 LUMBAR SPONDYLOSIS: ICD-10-CM

## 2022-10-06 DIAGNOSIS — M54.9 DORSALGIA: Primary | ICD-10-CM

## 2022-10-06 DIAGNOSIS — M54.30 SCIATIC NERVE PAIN, UNSPECIFIED LATERALITY: ICD-10-CM

## 2022-10-06 DIAGNOSIS — M54.16 LUMBAR RADICULOPATHY: ICD-10-CM

## 2022-10-06 PROCEDURE — 99213 OFFICE O/P EST LOW 20 MIN: CPT | Mod: ,,, | Performed by: NURSE PRACTITIONER

## 2022-10-06 PROCEDURE — 99213 PR OFFICE/OUTPT VISIT, EST, LEVL III, 20-29 MIN: ICD-10-PCS | Mod: ,,, | Performed by: NURSE PRACTITIONER

## 2022-10-06 NOTE — Clinical Note
Please see attached eval & plan of care for interventional pain. Thank you for your referral. Very pleasant patient. Wandy Bryan FNP-BC

## 2022-10-08 ENCOUNTER — DOCUMENTATION ONLY (OUTPATIENT)
Dept: INTERNAL MEDICINE | Facility: CLINIC | Age: 72
End: 2022-10-08
Payer: MEDICARE

## 2022-10-23 ENCOUNTER — PATIENT MESSAGE (OUTPATIENT)
Dept: ADMINISTRATIVE | Facility: OTHER | Age: 72
End: 2022-10-23
Payer: MEDICARE

## 2022-10-24 ENCOUNTER — DOCUMENTATION ONLY (OUTPATIENT)
Dept: INTERNAL MEDICINE | Facility: CLINIC | Age: 72
End: 2022-10-24
Payer: MEDICARE

## 2022-10-24 ENCOUNTER — PATIENT MESSAGE (OUTPATIENT)
Dept: ADMINISTRATIVE | Facility: OTHER | Age: 72
End: 2022-10-24
Payer: MEDICARE

## 2022-10-25 ENCOUNTER — ANESTHESIA EVENT (OUTPATIENT)
Dept: SURGERY | Facility: HOSPITAL | Age: 72
End: 2022-10-25
Payer: MEDICARE

## 2022-10-26 ENCOUNTER — ANESTHESIA (OUTPATIENT)
Dept: SURGERY | Facility: HOSPITAL | Age: 72
End: 2022-10-26
Payer: MEDICARE

## 2022-10-26 ENCOUNTER — HOSPITAL ENCOUNTER (OUTPATIENT)
Facility: HOSPITAL | Age: 72
Discharge: HOME | End: 2022-10-26
Attending: ANESTHESIOLOGY | Admitting: ANESTHESIOLOGY
Payer: MEDICARE

## 2022-10-26 ENCOUNTER — TELEPHONE (OUTPATIENT)
Dept: INTERNAL MEDICINE | Facility: CLINIC | Age: 72
End: 2022-10-26
Payer: MEDICARE

## 2022-10-26 DIAGNOSIS — G89.29 CHRONIC BACK PAIN GREATER THAN 3 MONTHS DURATION: Primary | ICD-10-CM

## 2022-10-26 DIAGNOSIS — M54.9 CHRONIC BACK PAIN GREATER THAN 3 MONTHS DURATION: Primary | ICD-10-CM

## 2022-10-26 DIAGNOSIS — G47.00 INSOMNIA, UNSPECIFIED TYPE: Primary | ICD-10-CM

## 2022-10-26 LAB — POCT GLUCOSE: 83 MG/DL (ref 70–110)

## 2022-10-26 PROCEDURE — 63600175 PHARM REV CODE 636 W HCPCS: Performed by: NURSE ANESTHETIST, CERTIFIED REGISTERED

## 2022-10-26 PROCEDURE — 64483 NJX AA&/STRD TFRM EPI L/S 1: CPT | Performed by: ANESTHESIOLOGY

## 2022-10-26 PROCEDURE — 64484 NJX AA&/STRD TFRM EPI L/S EA: CPT | Performed by: ANESTHESIOLOGY

## 2022-10-26 PROCEDURE — 25000003 PHARM REV CODE 250: Performed by: NURSE ANESTHETIST, CERTIFIED REGISTERED

## 2022-10-26 PROCEDURE — 82962 GLUCOSE BLOOD TEST: CPT | Performed by: ANESTHESIOLOGY

## 2022-10-26 PROCEDURE — 63600175 PHARM REV CODE 636 W HCPCS: Performed by: ANESTHESIOLOGY

## 2022-10-26 PROCEDURE — 64493 PR INJ DX/THER AGNT PARAVERT FACET JOINT,IMG GUIDE,LUMBAR/SAC,1ST LVL: ICD-10-PCS | Mod: 50,,, | Performed by: ANESTHESIOLOGY

## 2022-10-26 PROCEDURE — 64493 INJ PARAVERT F JNT L/S 1 LEV: CPT | Mod: 50 | Performed by: ANESTHESIOLOGY

## 2022-10-26 PROCEDURE — 25000003 PHARM REV CODE 250: Performed by: ANESTHESIOLOGY

## 2022-10-26 PROCEDURE — 37000008 HC ANESTHESIA 1ST 15 MINUTES: Performed by: ANESTHESIOLOGY

## 2022-10-26 PROCEDURE — 64493 INJ PARAVERT F JNT L/S 1 LEV: CPT | Mod: 50,,, | Performed by: ANESTHESIOLOGY

## 2022-10-26 RX ORDER — LIDOCAINE HYDROCHLORIDE 10 MG/ML
1 INJECTION, SOLUTION EPIDURAL; INFILTRATION; INTRACAUDAL; PERINEURAL ONCE
Status: CANCELLED | OUTPATIENT
Start: 2022-10-26 | End: 2022-10-26

## 2022-10-26 RX ORDER — TRIAMCINOLONE ACETONIDE 40 MG/ML
INJECTION, SUSPENSION INTRA-ARTICULAR; INTRAMUSCULAR
Status: DISCONTINUED | OUTPATIENT
Start: 2022-10-26 | End: 2022-10-26 | Stop reason: HOSPADM

## 2022-10-26 RX ORDER — LIDOCAINE HYDROCHLORIDE 10 MG/ML
INJECTION, SOLUTION EPIDURAL; INFILTRATION; INTRACAUDAL; PERINEURAL
Status: COMPLETED
Start: 2022-10-26 | End: 2022-10-26

## 2022-10-26 RX ORDER — PROPOFOL 10 MG/ML
VIAL (ML) INTRAVENOUS
Status: DISCONTINUED | OUTPATIENT
Start: 2022-10-26 | End: 2022-10-26

## 2022-10-26 RX ORDER — TRAZODONE HYDROCHLORIDE 50 MG/1
50 TABLET ORAL NIGHTLY
Qty: 30 TABLET | Refills: 5 | Status: SHIPPED | OUTPATIENT
Start: 2022-10-26 | End: 2023-04-27 | Stop reason: SDUPTHER

## 2022-10-26 RX ORDER — LIDOCAINE HYDROCHLORIDE 10 MG/ML
INJECTION, SOLUTION EPIDURAL; INFILTRATION; INTRACAUDAL; PERINEURAL
Status: DISCONTINUED | OUTPATIENT
Start: 2022-10-26 | End: 2022-10-26 | Stop reason: HOSPADM

## 2022-10-26 RX ORDER — LIDOCAINE HYDROCHLORIDE 10 MG/ML
INJECTION, SOLUTION EPIDURAL; INFILTRATION; INTRACAUDAL; PERINEURAL
Status: DISCONTINUED | OUTPATIENT
Start: 2022-10-26 | End: 2022-10-26

## 2022-10-26 RX ORDER — BUPIVACAINE HYDROCHLORIDE 2.5 MG/ML
INJECTION, SOLUTION EPIDURAL; INFILTRATION; INTRACAUDAL
Status: DISCONTINUED | OUTPATIENT
Start: 2022-10-26 | End: 2022-10-26 | Stop reason: HOSPADM

## 2022-10-26 RX ORDER — TRIAMCINOLONE ACETONIDE 40 MG/ML
INJECTION, SUSPENSION INTRA-ARTICULAR; INTRAMUSCULAR
Status: DISCONTINUED
Start: 2022-10-26 | End: 2022-10-26 | Stop reason: HOSPADM

## 2022-10-26 RX ORDER — SODIUM CHLORIDE, SODIUM GLUCONATE, SODIUM ACETATE, POTASSIUM CHLORIDE AND MAGNESIUM CHLORIDE 30; 37; 368; 526; 502 MG/100ML; MG/100ML; MG/100ML; MG/100ML; MG/100ML
INJECTION, SOLUTION INTRAVENOUS CONTINUOUS
Status: CANCELLED | OUTPATIENT
Start: 2022-10-26 | End: 2022-11-25

## 2022-10-26 RX ADMIN — PROPOFOL 100 MG: 10 INJECTION, EMULSION INTRAVENOUS at 12:10

## 2022-10-26 RX ADMIN — LIDOCAINE HYDROCHLORIDE 4 ML: 10 INJECTION, SOLUTION EPIDURAL; INFILTRATION; INTRACAUDAL; PERINEURAL at 12:10

## 2022-10-26 NOTE — OP NOTE
Bilateral L4-5 medial branch blocks    Pre-Procedure Diagnoses:  1. Chronic pain syndrome  2. Low back pain  3. Lumbar facet arthropathy    Post-Procedure Diagnoses:  1. Chronic pain syndrome  2. Low back pain  3. Lumbar facet arthropathy    Anesthesia:  Local and MAC    Estimated Blood Loss:  None    Complications:  None    Informed Consent:  The procedure, risks, benefits, and alternatives were discussed with the patient. There were no contraindications to the procedure. The patient expressed understanding and agreed to proceed. Fully informed written consent was obtained.     Description of the Procedure:  The patient was taken to the operating room. IV access was obtained prior to the start of the procedure. The patient was positioned prone on the fluoroscopy table. Continuous hemodynamic monitoring was initiated and continued throughout the duration of the procedure. The skin overlying the lumbosacral spine was prepped with Chloraprep and draped into a sterile field. Fluoroscopy was used to identify the locations of the left side L4, and L5 medial branch nerves at the junctions of the superior articular processes and transverse processes of L5, and the sacral ala, respectively. Skin anesthesia was achieved using 0.5 mL of 1% lidocaine over each injection site. A 22-gauge 3.5-inch Quinke spinal needle was slowly inserted and advanced under intermittent fluoroscopic guidance until it made bony contact at the target sites. Proper needle position was confirmed under AP, oblique, and lateral fluoroscopic views. Negative aspiration for blood or CSF was confirmed. Then a combination of 5 mg of Kenalog and 0.5 mL of 0.25% bupivacaine was easily injected at each level. There was no pain on injection. The needles were removed intact and bleeding was nil. The same procedure was repeated in identical fashion on the right side. Sterile bandages were applied. The patient was taken to the recovery room for further observation  in stable condition. The patient was then discharged home without any complications.

## 2022-10-26 NOTE — ANESTHESIA PREPROCEDURE EVALUATION
10/26/2022  Darwin Fletcher is a 72 y.o., male with HTN, Sleep Apnea and other medical problems listed in the EMR      Pre-op Assessment    I have reviewed the Patient Summary Reports.     I have reviewed the Nursing Notes. I have reviewed the NPO Status.   I have reviewed the Medications.     Review of Systems      Physical Exam  General: Well nourished and Cooperative    Airway:  Mallampati: II   Mouth Opening: Normal  TM Distance: Normal  Tongue: Normal  Neck ROM: Normal ROM    Chest/Lungs:  Clear to auscultation        Anesthesia Plan  Type of Anesthesia, risks & benefits discussed:    Anesthesia Type: Gen Natural Airway  Intra-op Monitoring Plan: Standard ASA Monitors  Induction:  IV  Informed Consent: Informed consent signed with the Patient and all parties understand the risks and agree with anesthesia plan.  All questions answered.   ASA Score: 3  Day of Surgery Review of History & Physical: H&P Update referred to the surgeon/provider.    Ready For Surgery From Anesthesia Perspective.     .   I explained anesthesia plan to patient/responsbile party if available.  Anesthesia consent done going over the material facts, risks, complications & alternatives, obtained which includes the possibility of altering the anesthesia plan.  I reviewed problem list, appropriate labs, any workup, Xray, EKG etc noted below.  Patients condition is satisfactory to proceed with anesthesia plan unless otherwise noted (see anesthesia chart for details of the anesthesia plan carried out).  Pt is here for a pain injection.  These usually only require MAC anesthesia.  Pain doctor requests IV GA.  Will provide this using IV propofol as needed.  The pain doctor has reviewed the patients meds including any anticoagulant meds.    Pre-operative evaluation for Procedure(s) (LRB):  Block-nerve-medial branch-lumbar (N/A)    Ht 5'  "9.02" (1.753 m)   Wt 104.3 kg (230 lb)   BMI 33.95 kg/m²     Patient Active Problem List   Diagnosis    Type 2 diabetes mellitus with diabetic polyneuropathy, without long-term current use of insulin    Other secondary pulmonary hypertension    Atherosclerosis of native coronary artery of native heart with angina pectoris    Hypothyroidism    Seasonal allergic rhinitis    Sleep apnea    Hypertension    Gout       Review of patient's allergies indicates:  No Known Allergies    Current Outpatient Medications   Medication Instructions    allopurinoL (ZYLOPRIM) 300 mg, Oral, Daily    amLODIPine (NORVASC) 10 mg, Oral, Daily    aspirin 81 mg Cap 1 tablet, Oral, Daily    fenofibrate 160 mg, Oral, Daily    glimepiride (AMARYL) 1 mg, Oral, 2 times daily    glucosamine-chondroitin 250-200 mg Tab 1 tablet, Oral, Daily    iron-vit c-b12-folic acid (IRON 100 PLUS) Tab 1 tablet, Daily    levothyroxine (SYNTHROID, LEVOTHROID) 175 MCG tablet TAKE ONE TABLET BY MOUTH EVERY DAY    lisinopriL 10 mg, Oral, 2 times daily    metFORMIN (GLUCOPHAGE) 500 MG tablet Take 1/2 tablet bid    omeprazole (PRILOSEC) 40 MG capsule TAKE ONE CAPSULE BY MOUTH EVERY DAY    traZODone (DESYREL) 50 mg, Oral, Nightly       Past Surgical History:   Procedure Laterality Date    CHOLECYSTECTOMY      COLONOSCOPY  03/17/2015    COLONOSCOPY Bilateral 07/20/2020    CORONARY ANGIOPLASTY WITH STENT PLACEMENT N/A     Excision of Skin Cancer (Arm)  N/A     EYE SURGERY      KNEE ARTHROSCOPY Left     VASECTOMY N/A        Social History     Socioeconomic History    Marital status:    Tobacco Use    Smoking status: Former     Packs/day: 2.00     Years: 35.00     Pack years: 70.00     Types: Cigarettes    Smokeless tobacco: Never   Substance and Sexual Activity    Alcohol use: Yes     Alcohol/week: 9.0 standard drinks     Types: 2 Glasses of wine, 2 Cans of beer, 3 Shots of liquor, 2 Drinks containing 0.5 oz of alcohol per week    " Drug use: Never    Sexual activity: Yes       Lab Results   Component Value Date    WBC 6.0 09/08/2022    HGB 12.9 (L) 09/08/2022    HCT 40.5 (L) 09/08/2022    MCV 96.2 (H) 09/08/2022     09/08/2022          BMP  Lab Results   Component Value Date    HCT 40.5 (L) 09/08/2022     09/08/2022    K 4.2 09/08/2022    BUN 23.9 09/08/2022    CREATININE 0.90 09/08/2022    CALCIUM 10.1 (H) 09/08/2022        INR  No results for input(s): PT, INR, PROTIME, APTT in the last 72 hours.        Diagnostic Studies:      EKG:  No results found for this or any previous visit.

## 2022-10-26 NOTE — DISCHARGE SUMMARY
[facility] - Periop Services  Discharge Note  Short Stay    Procedure(s) (LRB):  Block-nerve-medial branch-lumbar (N/A)      OUTCOME: Patient tolerated treatment/procedure well without complication and is now ready for discharge.    DISPOSITION: Home or Self Care    FINAL DIAGNOSIS:  <principal problem not specified>    FOLLOWUP: In clinic    DISCHARGE INSTRUCTIONS:  No discharge procedures on file.     TIME SPENT ON DISCHARGE: 5 minutes

## 2022-10-26 NOTE — H&P
Patient ID: Darwin Fletcher is a 71 y.o. male.     Chief Complaint: Back Pain (Sciatic nerve pain. States pain is 8/10. Pain worsen when sitting down. States can't walk as long as he used to. )     Referred by: Kana Smith Jr.*      HPI  New consult low back pain, symptoms persist with > 6wks conservative treatment. 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 Pain curently limits his ability  to walk and has but on weight. Pain intensified w/prolonged sitting and wakes him up at night. Pain originates from bilateral buttock and radiates on left  to back of thigh and crosses over to anterior left shin. Right buttock pain locals w/out radiation     Pain is sharp. Symptoms persist with conservative treatment. PT completed and  helped a lot until pain started worsening the sciatica and made pain unbearable. No interventional pain procedures.  OTC meds trialed ASA Tylenol, Gabapentin, trialed Tizanidine. None of these meds helped his pain.Pertinent PMH DMII (controlled). Bunion removal left foot.         CT Lumbar Spine: 09/2022:  FINDINGS:  There are 5 non-rib-bearing lumbar type vertebral bodies.  There is a leftward curvature of the lumbar spine centered at L3.  There is grade 1 retrolisthesis of L2, L3 and L4.  The vertebral heights are maintained.  There is no acute fracture identified.     There are multilevel degenerative changes with disc height loss, marginal osteophyte formation and facet arthropathy.  There is multilevel degenerative narrowing of the spinal canal, severe at L2-L3 and L3-L4, moderate at L4-L5.  There are multilevel neural foraminal stenoses, severe on the left at L4-L5, moderate on the right at L2-L5 and on the left at L2-L4 and L5-S1.     The paraspinal soft tissues are unremarkable.     Impression:  1. No acute fracture identified.  2. Multilevel degenerative changes of the lumbar spine.     Pertinent Labs 2022:  A1c  4.9  Crea/GFR normal              ROS       As noted in HPI  Objective:         Physical Exam  General: Well developed; overweight; A&O x 3; No anxiety/depression; NAD  Mental Status: Oriented to person, palce and time. Displays appropriate mood & affect.  Head: Norm cephalic and atraumatic  Neck: Midline trachea  Eyes: Eyes midline with conjugate gaze. PERRLA  ENT and mouth: normal external ear, nose, and no lesions noted on the lips.  Respiratory: Symmetrical, Unlabored  Abdomen: Non-distended + bowel sounds     Extremities:  Gen: No cyanosis or tenderness to palpation bilateral LE  Skin: Warm, pink, dry, no rashes, no lesions on the lumbar spine  Strength: 5/5 motor strength bilateral LE  ROM: Full ROM in bilateral knees . Slight instability to left knee. Bilateral Ankles without pain or instability.     Neuro:  Gait: Independent Ambulator. No altalgic lean, normal toe and heel raise  DTR's: 2+ in bilateral patellar, and ankle  Sensory: Intact to light touch bilateral lower extremities     Spine: +tenderness to palpation over bilateral buttocks L>R.      L-spine ROM: Full ROM to flexion, extension, bilateral rotation normal.  Straight Leg Raise: normal bilaterally.                Assessment:            Encounter Diagnoses   Name Primary?    Dorsalgia Yes    Lumbar radiculopathy      Sciatic nerve pain, unspecified laterality            Plan:       Darwin was seen today for back pain.     Diagnoses and all orders for this visit:     Dorsalgia     Lumbar radiculopathy     Sciatic nerve pain, unspecified laterality  -     Ambulatory referral/consult to Anesthesiology        Left MBB L4/L5  Hold ASA for one week prior to proceure  Follow up after MBB  Patient instructions printed.   CC send to Dr. Sarah Hamilton    negative - no vomiting, no diarrhea

## 2022-10-26 NOTE — ANESTHESIA POSTPROCEDURE EVALUATION
Anesthesia Post Evaluation    Patient: Darwin Fletcher    Procedure(s) Performed: Procedure(s) (LRB):  Block-nerve-medial branch-lumbar (N/A)    Final Anesthesia Type: general (/Regional//MAC)      Patient location during evaluation: PACU  Post-procedure mental status: @ basline.  Post-procedure vital signs: reviewed and stable  Pain management: adequate    PONV status: See postop meds for drugs used to control n/v if any.  Anesthetic complications: no      Cardiovascular status: blood pressure returned to baseline  Respiratory status: @ baseline.  Hydration status: euvolemic            Vitals Value Taken Time   /81 10/26/22 1305   Temp 98 10/26/22 1437   Pulse 61 10/26/22 1316   Resp 20 10/26/22 1305   SpO2 98 % 10/26/22 1316   Vitals shown include unvalidated device data.      No case tracking events are documented in the log.      Pain/Lisandro Score: No data recorded

## 2022-10-26 NOTE — TELEPHONE ENCOUNTER
----- Message from Velia Mckeon Patient Care Assistant sent at 10/26/2022  9:08 AM CDT -----  Regarding: med refill  Type:  RX Refill Request    RX Name and Strength: traZODone (DESYREL) 50 MG tablet    How is the patient currently taking it? (ex. 1XDay): Take 50 mg by mouth every evening    Preferred Pharmacy with phone number: [place] [address] [facility] [address]    Best Call Back Number: [phone]    Additional Information: pt contacted the pharmacy and the pharmacy sent over a req and pt is still waiting to get his script refilled. Could you please call the pt back to let him know if you are waiting on doc signature or what you can do to get this script refilled for him. Pt has been waiting almost a week now to get this refill I did inform the pt Dr mSith was out sick. Can you help him refill this med please?

## 2022-10-27 VITALS
WEIGHT: 235 LBS | HEART RATE: 67 BPM | HEIGHT: 69 IN | DIASTOLIC BLOOD PRESSURE: 81 MMHG | TEMPERATURE: 98 F | OXYGEN SATURATION: 99 % | SYSTOLIC BLOOD PRESSURE: 129 MMHG | BODY MASS INDEX: 34.8 KG/M2 | RESPIRATION RATE: 20 BRPM

## 2022-11-08 NOTE — PROGRESS NOTES
Subjective:      Patient ID: Darwin Fletcher is a 72 y.o. male.    Chief Complaint: Follow-up (Post op injections, Left MBB L4/L5. Patient states that the injections gave very little relief. Patient states pain today is 2/10. )    Referred by: No ref. provider found     HPI Pt presents as a follow up post op injections to left L4/L5 on 10/26/2022. Reports  the MBB to L4/L5 did not work originally or afirst couple of days after  procedure.  Pain was reduced only  when he did nothing over the weekend, which is not sustainable. Sitting in the chair causes pain to radiate down left leg to foot described as a dull ache. Pain curently limits his ability  to walk . Pain intensified w/prolonged sitting and wakes him up at night. Pain originates from bilateral buttock and radiates on left  to back of thigh and crosses over to anterior left shin. Right buttock pain localizes  w/out radiation     Pain is sharp and ach yand rated as a 2/10 and elevated much highter with prolonged sitting and walking. Symptoms persist with conservative treatment. PT completed and  helped a lot until pain started worsening the sciatica and made pain unbearable. Patient has taken Gabapentin in the past that helped and is not sure why he stopped taking this. He has not had a recent MRI of his L Spine, only a CT Scan.     Interventional Pain History  10/26/2022: MBBLeft L4/L5      ROS  As noted in HPI      Objective:          Physical Exam      General: Well developed; overweight; A&O x 3; No anxiety/depression; NAD  Mental Status: Oriented to person, palce and time. Displays appropriate mood & affect.  Head: Norm cephalic and atraumatic  Neck: Midline trachea  Eyes: Eyes midline with conjugate gaze. PERRLA  ENT and mouth: normal external ear, nose, and no lesions noted on the lips.  Respiratory: Symmetrical, Unlabored  Abdomen: Non-distended + bowel sounds     Extremities:  Gen: No cyanosis or tenderness to palpation bilateral LE  Skin: Warm, pink,  dry, no rashes, no lesions on the lumbar spine  Strength: 5/5 motor strength bilateral LE  ROM: Full ROM in bilateral knees . Slight instability to left knee. Bilateral Ankles without pain or instability.     Neuro:  Gait: Independent Ambulator. No altalgic lean, normal toe and heel raise  DTR's: 2+ in bilateral patellar, and ankle  Sensory: Intact to light touch bilateral lower extremities     Spine: +tenderness to palpation over bilateral buttocks L>R.      L-spine ROM: Full ROM to flexion, extension, bilateral rotation normal.  Straight Leg Raise: normal bilaterally.      Assessment:       Encounter Diagnoses   Name Primary?    Lumbar radiculopathy Yes    Dorsalgia, unspecified     Lumbar radiculopathy, chronic          Plan:       Darwin was seen today for follow-up.    Diagnoses and all orders for this visit:    Lumbar radiculopathy  -     gabapentin (NEURONTIN) 300 MG capsule; Take 1 capsule (300 mg total) by mouth 2 (two) times daily.    Dorsalgia, unspecified  -     MRI Lumbar Spine Without Contrast; Future    Lumbar radiculopathy, chronic  -     MRI Lumbar Spine Without Contrast; Future     Follow up with me in 3 weeks to go over MRI L Spine results and plan of care + effectiveness of Gabapentin.

## 2022-11-09 ENCOUNTER — OFFICE VISIT (OUTPATIENT)
Dept: ORTHOPEDICS | Facility: CLINIC | Age: 72
End: 2022-11-09
Payer: MEDICARE

## 2022-11-09 VITALS
HEIGHT: 69 IN | HEART RATE: 59 BPM | DIASTOLIC BLOOD PRESSURE: 71 MMHG | WEIGHT: 235 LBS | SYSTOLIC BLOOD PRESSURE: 168 MMHG | BODY MASS INDEX: 34.8 KG/M2

## 2022-11-09 DIAGNOSIS — M54.16 LUMBAR RADICULOPATHY, CHRONIC: ICD-10-CM

## 2022-11-09 DIAGNOSIS — M54.9 DORSALGIA, UNSPECIFIED: ICD-10-CM

## 2022-11-09 DIAGNOSIS — M54.16 LUMBAR RADICULOPATHY: Primary | ICD-10-CM

## 2022-11-09 PROCEDURE — 99214 OFFICE O/P EST MOD 30 MIN: CPT | Mod: ,,, | Performed by: NURSE PRACTITIONER

## 2022-11-09 PROCEDURE — 99214 PR OFFICE/OUTPT VISIT, EST, LEVL IV, 30-39 MIN: ICD-10-PCS | Mod: ,,, | Performed by: NURSE PRACTITIONER

## 2022-11-09 RX ORDER — GABAPENTIN 300 MG/1
300 CAPSULE ORAL 2 TIMES DAILY
Qty: 60 CAPSULE | Refills: 3 | Status: SHIPPED | OUTPATIENT
Start: 2022-11-09 | End: 2023-07-05

## 2022-11-21 ENCOUNTER — TELEPHONE (OUTPATIENT)
Dept: INTERNAL MEDICINE | Facility: CLINIC | Age: 72
End: 2022-11-21
Payer: MEDICARE

## 2022-11-21 DIAGNOSIS — M10.9 GOUT, UNSPECIFIED CAUSE, UNSPECIFIED CHRONICITY, UNSPECIFIED SITE: Primary | Chronic | ICD-10-CM

## 2022-11-21 RX ORDER — DICLOFENAC SODIUM 75 MG/1
75 TABLET, DELAYED RELEASE ORAL 2 TIMES DAILY
Qty: 60 TABLET | Refills: 2 | Status: SHIPPED | OUTPATIENT
Start: 2022-11-21 | End: 2023-03-13 | Stop reason: SDUPTHER

## 2022-11-21 RX ORDER — DICLOFENAC SODIUM 75 MG/1
75 TABLET, DELAYED RELEASE ORAL 2 TIMES DAILY
COMMUNITY
Start: 2022-10-13 | End: 2022-11-21 | Stop reason: SDUPTHER

## 2022-11-21 NOTE — TELEPHONE ENCOUNTER
----- Message from Velia Mckeon Patient Care Assistant sent at 11/21/2022  3:30 PM CST -----  Regarding: med refill  Type:  RX Refill Request    RX Name and Strength: diclofenac (VOLTAREN) 75 MG EC tablet    How is the patient currently taking it? (ex. 1XDay): Take 1 tablet (75 mg total) by mouth 2 (two) times daily. for 14 days     Is this a 30 day or 90 day RX: 28 tablets    Preferred Pharmacy with phone number: [place] [address] [facility] [id number] Call Back Number: [phone]    Additional Information: pt is needing a refill on this med he said he hasn't had to req in a while but he had no refills

## 2022-11-30 ENCOUNTER — TELEPHONE (OUTPATIENT)
Dept: INTERNAL MEDICINE | Facility: CLINIC | Age: 72
End: 2022-11-30
Payer: MEDICARE

## 2022-11-30 DIAGNOSIS — K21.9 GASTROESOPHAGEAL REFLUX DISEASE, UNSPECIFIED WHETHER ESOPHAGITIS PRESENT: Primary | ICD-10-CM

## 2022-11-30 RX ORDER — OMEPRAZOLE 40 MG/1
40 CAPSULE, DELAYED RELEASE ORAL DAILY
Qty: 90 CAPSULE | Refills: 3 | Status: SHIPPED | OUTPATIENT
Start: 2022-11-30 | End: 2023-09-19

## 2022-11-30 NOTE — TELEPHONE ENCOUNTER
----- Message from Velia Mckeon Patient Care Assistant sent at 11/30/2022 10:58 AM CST -----  Regarding: med refill  Type:  RX Refill Request    RX Name and Strength: omeprazole (PRILOSEC) 40 MG capsule    How is the patient currently taking it? (ex. 1XDay):  TAKE ONE CAPSULE BY MOUTH EVERY DAY    Is this a 30 day or 90 day RX: 90 capsules    Preferred Pharmacy with phone number: [place] [address] [facility] [id number] Call Back Number: [phone]    Additional Information: pt needs refill on med please

## 2022-12-01 ENCOUNTER — APPOINTMENT (OUTPATIENT)
Dept: RADIOLOGY | Facility: HOSPITAL | Age: 72
End: 2022-12-01
Attending: NURSE PRACTITIONER
Payer: MEDICARE

## 2022-12-01 DIAGNOSIS — M54.16 LUMBAR RADICULOPATHY, CHRONIC: ICD-10-CM

## 2022-12-01 DIAGNOSIS — M54.9 DORSALGIA, UNSPECIFIED: ICD-10-CM

## 2022-12-01 PROCEDURE — 72148 MRI LUMBAR SPINE W/O DYE: CPT | Mod: TC

## 2022-12-01 NOTE — PROGRESS NOTES
Subjective:      Patient ID: Darwin Fletcher is a 72 y.o. male.    Chief Complaint: Results (Follow up visit to discuss MRI results. Pain is a 2/10 today 8/10 on worse day. Taking prescription medications for pain. States does exercises daily.  )    Referred by: No ref. provider found     HPI patient presents as a follow-up to go over lumbar MRI results.  He has history of lumbar radiculopathy, dorsalgia, during his last follow-up appointment on 11/09 seen for postop injections to left medial branch block L4-5 with a injection giving him very little relief.  He  tried Gabapentin 300 mg TID, stated this did not help. He is taking Diclofenac 75 mg daily for 3-4 days, then he has to skip 2-3 days in between as it affects his stomach. States this med takes away 80-90% of pain and allows him to perform normal accutivies.   .      Currently has pain is located to low back and left low back, buttock and radiates down left to calf. Once he reaches 1,000 steps he will fill the shooting pain. Reports Diclofenac reduces 80-90% of the pain. Patient is satisified with pain control, not the side effects.     MRI Lumbar Spine 2022:  FINDINGS:  For the purpose of this report, the most inferior well developed intervertebral disc space is presumed to represent L5-S1.  There is lumbar levoscoliotic curvature.  There is grade 1 retrolisthesis of L2 on L3 and L4 on L5.  Schmorl node defect contiguous endplates of L1 and L2.  Lumbar vertebrae stature is preserved.  No acute marrow edematous signal.  The visualized thoracic cord is unremarkable. The conus medullaris terminates at L1.  Right kidney T2 weighted hyperintense cystic structure.  Sacral Tarlov cyst.  Disc segmental analysis is given below:     At L1-L2, there is broad disc bulge which flattens and mildly compresses the ventral thecal sac.  Bilateral facet arthropathy.  These findings combine to cause mild central canal stenosis.  There are bilateral mild spondylotic narrowings  of the neural foramen     At L2-L3, there is broad osteophyte disc complex which compresses the ventral thecal sac.  Bilateral facet arthropathy which is more pronounced on the right.  These findings cause moderate to marked central canal stenosis.  There is marked narrowing of the right neural foramen which dorsally is encroached by facet arthropathy.  There is also moderate narrowing of the left neural foramen.     At L3-L4, there is broad osteophyte disc complex which compresses the ventral thecal sac.  Central canal stenosis is marked and is also caused by facet arthropathy.  Bilateral lateral recesses are effaced.  There is severe spondylotic narrowing of the right neural foramen.  There is also moderate to marked narrowing of the left neural canal.     At L4-L5, there is osteophyte disc complex.  Left paracentral posterior vertebral spondylosis is more pronounced.  There is also prominent left uncovertebral prominent spur like formation.  Bilateral facet arthropathy.  These findings combine to cause moderate to marked central canal stenosis.  There is mild narrowing of the right neural foramen.  The left lateral recess is effaced and there is marked narrowing of the left neural foramen.     At L5-S1, there is osteophyte disc complex which slightly indents the ventral thecal sac bilateral facet arthropathy.  There is no significant central canal stenosis.  There is mild spondylotic narrowing of the right neural foramen and moderate narrowing of the left neural canal.     Impression:     Lumbar degenerative disc disease and spondylosis level by level discussed above.          Interventional Pain History      ROS  As noted in HPI      Objective:          Physical Exam   General: Well developed; overweight; A&O x 3; No anxiety/depression; NAD  Mental Status: Oriented to person, palce and time. Displays appropriate mood & affect.  Head: Norm cephalic and atraumatic  Neck: Midline trachea  Eyes: Eyes midline with  conjugate gaze. PERRLA  ENT and mouth: normal external ear, nose, and no lesions noted on the lips.  Respiratory: Symmetrical, Unlabored  Abdomen: Non-distended + bowel sounds     Extremities:  Gen: No cyanosis or tenderness to palpation bilateral LE  Skin: Warm, pink, dry, no rashes, no lesions on the lumbar spine  Strength: 5/5 motor strength bilateral LE  ROM: Full ROM in bilateral knees . Slight instability to left knee. Bilateral Ankles without pain or instability.     Neuro:  Gait: Independent Ambulator. No altalgic lean, normal toe and heel raise  DTR's: 2+ in bilateral patellar, and ankle  Sensory: Intact to light touch bilateral lower extremities     Spine: +tenderness to palpation over bilateral buttocks L>R.      L-spine ROM:limited ROM to   extension, bilateral rotation normal.  Straight Leg Raise: normal bilaterally.       Assessment:       Encounter Diagnoses   Name Primary?    Lumbar radiculopathy Yes    Dorsalgia          Plan:       Darwin was seen today for results.    Diagnoses and all orders for this visit:    Lumbar radiculopathy    Dorsalgia       TFESI Left L5  Hold diclofenac X 1 week  Follow up post op

## 2022-12-05 ENCOUNTER — OFFICE VISIT (OUTPATIENT)
Dept: ORTHOPEDICS | Facility: CLINIC | Age: 72
End: 2022-12-05
Payer: MEDICARE

## 2022-12-05 VITALS
HEART RATE: 67 BPM | DIASTOLIC BLOOD PRESSURE: 77 MMHG | HEIGHT: 69 IN | BODY MASS INDEX: 34.8 KG/M2 | WEIGHT: 235 LBS | SYSTOLIC BLOOD PRESSURE: 146 MMHG

## 2022-12-05 DIAGNOSIS — M54.9 DORSALGIA: ICD-10-CM

## 2022-12-05 DIAGNOSIS — M54.16 LUMBAR RADICULOPATHY: Primary | ICD-10-CM

## 2022-12-05 DIAGNOSIS — M54.16 LUMBAR RADICULOPATHY, CHRONIC: ICD-10-CM

## 2022-12-05 PROCEDURE — 99213 OFFICE O/P EST LOW 20 MIN: CPT | Mod: ,,, | Performed by: NURSE PRACTITIONER

## 2022-12-05 PROCEDURE — 99213 PR OFFICE/OUTPT VISIT, EST, LEVL III, 20-29 MIN: ICD-10-PCS | Mod: ,,, | Performed by: NURSE PRACTITIONER

## 2022-12-13 ENCOUNTER — TELEPHONE (OUTPATIENT)
Dept: INTERNAL MEDICINE | Facility: CLINIC | Age: 72
End: 2022-12-13
Payer: MEDICARE

## 2022-12-13 DIAGNOSIS — E78.5 HYPERLIPIDEMIA, UNSPECIFIED HYPERLIPIDEMIA TYPE: Primary | ICD-10-CM

## 2022-12-13 RX ORDER — FENOFIBRATE 160 MG/1
160 TABLET ORAL DAILY
Qty: 90 TABLET | Refills: 3 | Status: SHIPPED | OUTPATIENT
Start: 2022-12-13 | End: 2023-09-19

## 2022-12-13 NOTE — TELEPHONE ENCOUNTER
Sent at this time    ----- Message from Anali Abreu sent at 12/13/2022  9:05 AM CST -----  Need refills on Fenosibrate  Uses [facility] on [address]

## 2023-01-04 ENCOUNTER — ANESTHESIA EVENT (OUTPATIENT)
Dept: SURGERY | Facility: HOSPITAL | Age: 73
End: 2023-01-04
Payer: MEDICARE

## 2023-01-04 RX ORDER — LIDOCAINE HYDROCHLORIDE 10 MG/ML
1 INJECTION, SOLUTION EPIDURAL; INFILTRATION; INTRACAUDAL; PERINEURAL ONCE
Status: CANCELLED | OUTPATIENT
Start: 2023-01-04 | End: 2023-01-04

## 2023-01-04 RX ORDER — SODIUM CHLORIDE 0.9 % (FLUSH) 0.9 %
10 SYRINGE (ML) INJECTION
Status: CANCELLED | OUTPATIENT
Start: 2023-01-05

## 2023-01-05 ENCOUNTER — HOSPITAL ENCOUNTER (OUTPATIENT)
Facility: HOSPITAL | Age: 73
Discharge: HOME | End: 2023-01-05
Attending: ANESTHESIOLOGY | Admitting: ANESTHESIOLOGY
Payer: MEDICARE

## 2023-01-05 ENCOUNTER — ANESTHESIA (OUTPATIENT)
Dept: SURGERY | Facility: HOSPITAL | Age: 73
End: 2023-01-05
Payer: MEDICARE

## 2023-01-05 DIAGNOSIS — M54.9 CHRONIC BACK PAIN GREATER THAN 3 MONTHS DURATION: ICD-10-CM

## 2023-01-05 DIAGNOSIS — G89.29 CHRONIC BACK PAIN GREATER THAN 3 MONTHS DURATION: ICD-10-CM

## 2023-01-05 LAB — POCT GLUCOSE: 106 MG/DL (ref 70–110)

## 2023-01-05 PROCEDURE — 82962 GLUCOSE BLOOD TEST: CPT | Performed by: ANESTHESIOLOGY

## 2023-01-05 PROCEDURE — 64483 PR EPIDURAL INJ, ANES/STEROID, TRANSFORAMINAL, LUMB/SACR, SNGL LEVL: ICD-10-PCS | Mod: LT,,, | Performed by: ANESTHESIOLOGY

## 2023-01-05 PROCEDURE — 37000008 HC ANESTHESIA 1ST 15 MINUTES: Performed by: ANESTHESIOLOGY

## 2023-01-05 PROCEDURE — 25000003 PHARM REV CODE 250: Performed by: NURSE ANESTHETIST, CERTIFIED REGISTERED

## 2023-01-05 PROCEDURE — 64483 NJX AA&/STRD TFRM EPI L/S 1: CPT | Mod: LT | Performed by: ANESTHESIOLOGY

## 2023-01-05 PROCEDURE — 64483 NJX AA&/STRD TFRM EPI L/S 1: CPT | Mod: LT,,, | Performed by: ANESTHESIOLOGY

## 2023-01-05 PROCEDURE — 25000003 PHARM REV CODE 250: Performed by: ANESTHESIOLOGY

## 2023-01-05 PROCEDURE — 63600175 PHARM REV CODE 636 W HCPCS: Performed by: NURSE ANESTHETIST, CERTIFIED REGISTERED

## 2023-01-05 PROCEDURE — 63600175 PHARM REV CODE 636 W HCPCS: Performed by: ANESTHESIOLOGY

## 2023-01-05 RX ORDER — BUPIVACAINE HYDROCHLORIDE 2.5 MG/ML
INJECTION, SOLUTION EPIDURAL; INFILTRATION; INTRACAUDAL
Status: DISCONTINUED
Start: 2023-01-05 | End: 2023-01-05 | Stop reason: HOSPADM

## 2023-01-05 RX ORDER — DEXMEDETOMIDINE HYDROCHLORIDE 100 UG/ML
INJECTION, SOLUTION INTRAVENOUS
Status: DISCONTINUED | OUTPATIENT
Start: 2023-01-05 | End: 2023-01-05

## 2023-01-05 RX ORDER — LIDOCAINE HYDROCHLORIDE 10 MG/ML
INJECTION, SOLUTION EPIDURAL; INFILTRATION; INTRACAUDAL; PERINEURAL
Status: DISCONTINUED
Start: 2023-01-05 | End: 2023-01-05 | Stop reason: HOSPADM

## 2023-01-05 RX ORDER — PROPOFOL 10 MG/ML
VIAL (ML) INTRAVENOUS
Status: DISCONTINUED | OUTPATIENT
Start: 2023-01-05 | End: 2023-01-05

## 2023-01-05 RX ORDER — DEXAMETHASONE SODIUM PHOSPHATE 10 MG/ML
INJECTION INTRAMUSCULAR; INTRAVENOUS
Status: DISCONTINUED | OUTPATIENT
Start: 2023-01-05 | End: 2023-01-05 | Stop reason: HOSPADM

## 2023-01-05 RX ORDER — BUPIVACAINE HYDROCHLORIDE 2.5 MG/ML
INJECTION, SOLUTION EPIDURAL; INFILTRATION; INTRACAUDAL
Status: DISCONTINUED | OUTPATIENT
Start: 2023-01-05 | End: 2023-01-05 | Stop reason: HOSPADM

## 2023-01-05 RX ORDER — LIDOCAINE HYDROCHLORIDE 10 MG/ML
INJECTION, SOLUTION EPIDURAL; INFILTRATION; INTRACAUDAL; PERINEURAL
Status: DISCONTINUED | OUTPATIENT
Start: 2023-01-05 | End: 2023-01-05

## 2023-01-05 RX ORDER — DEXAMETHASONE SODIUM PHOSPHATE 10 MG/ML
INJECTION INTRAMUSCULAR; INTRAVENOUS
Status: DISCONTINUED
Start: 2023-01-05 | End: 2023-01-05 | Stop reason: HOSPADM

## 2023-01-05 RX ORDER — LIDOCAINE HYDROCHLORIDE 10 MG/ML
INJECTION INFILTRATION; PERINEURAL
Status: DISCONTINUED | OUTPATIENT
Start: 2023-01-05 | End: 2023-01-05 | Stop reason: HOSPADM

## 2023-01-05 RX ADMIN — LIDOCAINE HYDROCHLORIDE 25 MG: 10 INJECTION, SOLUTION EPIDURAL; INFILTRATION; INTRACAUDAL; PERINEURAL at 11:01

## 2023-01-05 RX ADMIN — PROPOFOL 70 MG: 10 INJECTION, EMULSION INTRAVENOUS at 11:01

## 2023-01-05 RX ADMIN — DEXMEDETOMIDINE 4 MCG: 200 INJECTION, SOLUTION INTRAVENOUS at 11:01

## 2023-01-05 RX ADMIN — DEXMEDETOMIDINE 8 MCG: 200 INJECTION, SOLUTION INTRAVENOUS at 11:01

## 2023-01-05 NOTE — ANESTHESIA POSTPROCEDURE EVALUATION
Anesthesia Post Evaluation    Patient: Darwin Fletcher    Procedure(s) Performed: Procedure(s) (LRB):  INJECTION, STEROID, EPIDURAL, TRANSFORAMINAL APPROACH Left L5 (Left)    Final Anesthesia Type: MAC      Patient location during evaluation: OPS  Patient participation: Yes- Able to Participate  Level of consciousness: awake and alert  Post-procedure vital signs: reviewed and stable  Pain management: adequate  Airway patency: patent  ARY mitigation strategies: Preoperative initiation of continuous positive airway pressure (CPAP) or non-invasive positive pressure ventilation (NIPPV)  PONV status at discharge: No PONV  Anesthetic complications: no      Cardiovascular status: blood pressure returned to baseline  Respiratory status: unassisted and spontaneous ventilation  Hydration status: euvolemic  Follow-up not needed.            No case tracking events are documented in the log.      Pain/Lisandro Score: Lisandro Score: 10 (1/5/2023 11:20 AM)

## 2023-01-05 NOTE — ANESTHESIA PREPROCEDURE EVALUATION
01/04/2023  Darwin Fletcher is a 72 y.o., male , who presents for the following:    Procedure: INJECTION, STEROID, EPIDURAL, TRANSFORAMINAL APPROACH Left L5 (Left: Back) - L5   Anesthesia type: Local MAC   Diagnosis:        Lumbar radiculopathy [M54.16]       Lumbar radiculopathy, chronic [M54.16]   Pre-op diagnosis:        Lumbar radiculopathy [M54.16]       Lumbar radiculopathy, chronic [M54.16]   Location: [facility] VIRTUAL PROCEDURAL ROOM / [facility] OR   Surgeons: Samreen Horne MD       Pre-op Assessment    I have reviewed the Patient Summary Reports.     I have reviewed the Nursing Notes. I have reviewed the NPO Status.   I have reviewed the Medications.     Review of Systems  Anesthesia Hx:  No problems with previous Anesthesia  Denies Family Hx of Anesthesia complications.   Denies Personal Hx of Anesthesia complications.   Social:  Former Smoker    Cardiovascular:   Hypertension CAD   Angina CAD, remote H/O  stents x 3   Pulmonary:   Sleep Apnea    Neurological:   DM Polyneuropathy   Endocrine:   Diabetes, type 2 Hypothyroidism        Physical Exam  General: Alert and Oriented    Airway:  Mallampati: III   Mouth Opening: Normal  TM Distance: Normal  Tongue: Normal  Neck ROM: Normal ROM    Dental:  Intact    Chest/Lungs:  Normal Respiratory Rate    Heart:  Rate: Normal  Rhythm: Regular Rhythm        Anesthesia Plan  Type of Anesthesia, risks & benefits discussed:    Anesthesia Type: Gen Natural Airway  Intra-op Monitoring Plan: Standard ASA Monitors  Post Op Pain Control Plan: IV/PO Opioids PRN  Induction:  IV  Airway Plan: Direct  Informed Consent: Informed consent signed with the Patient and all parties understand the risks and agree with anesthesia plan.  All questions answered. Patient consented to blood products? No  ASA Score: 3  Day of Surgery Review of History & Physical: H&P Update referred to the  surgeon/provider.  Anesthesia Plan Notes: Nasal cannula vs facemask supplemental oxygenation   For patients with ARY/obesity, may consider SuperNoval Nasal CPAP      Ready For Surgery From Anesthesia Perspective.     .

## 2023-01-05 NOTE — DISCHARGE SUMMARY
[facility] - Periop Services  Discharge Note  Short Stay    Procedure(s) (LRB):  INJECTION, STEROID, EPIDURAL, TRANSFORAMINAL APPROACH Left L5 (Left)      OUTCOME: Patient tolerated treatment/procedure well without complication and is now ready for discharge.    DISPOSITION: Home or Self Care    FINAL DIAGNOSIS:  <principal problem not specified>    FOLLOWUP: In clinic    DISCHARGE INSTRUCTIONS:  No discharge procedures on file.     TIME SPENT ON DISCHARGE: 5 minutes

## 2023-01-05 NOTE — OP NOTE
Procedure:    Left L5  transforaminal epidural steroid injection    Pre-Procedure Diagnoses:  Chronic back pain greater than 3 months  Lumbar degenerative disc disease  Lumbar radiculitis  Lumbar disc displacement    Post-Procedure Diagnoses:  Chronic back pain greater than 3 months  Lumbar degenerative disc disease  Lumbar radiculitis  Lumbar disc displacement    Anesthesia:  Local and MAC    Estimated Blood Loss:  < 2 ML    Consent:  The procedure, risks, benefits, and alternatives were discussed with the patient.  The patient voiced understanding and fully informed written consent was obtained.    Description of the Procedure:  The patient was taken to the operating room and placed in the prone position. The skin overlying the lumbar spine was prepped with Chloraprep and draped in the usual sterile fashion.  An oblique fluoroscopic view was obtained on the left side at L5, with the superior articular process of the sacral ala aligned with the pedicle.  Skin anesthesia was achieved using 2 mL of lidocaine 1%.  A 22-gauge 5-inch Quinke spinal needle was inserted and advanced under intermittent fluoroscopic views into the epidural space. Proper needle position was confirmed under AP, oblique, and lateral fluoroscopic views.  Negative aspiration for blood or CSF was confirmed. 1 mL of contrast was injected, which revealed spread into the epidural space.  Then a combination of 5 mg of dexamethasone with 1 mL of 0.25% bupivacaine was easily injected.   There was no pain on injection. The needle was removed intact and bleeding was nil.  Sterile bandages were applied. The patient was taken to the recovery room for further observation in stable condition. The patient was then discharged home with no complications.

## 2023-01-05 NOTE — H&P
Patient ID: Darwin Fletcher is a 72 y.o. male.     Chief Complaint: Results (Follow up visit to discuss MRI results. Pain is a 2/10 today 8/10 on worse day. Taking prescription medications for pain. States does exercises daily.  )     Referred by: No ref. provider found      HPI patient presents as a follow-up to go over lumbar MRI results.  He has history of lumbar radiculopathy, dorsalgia, during his last follow-up appointment on 11/09 seen for postop injections to left medial branch block L4-5 with a injection giving him very little relief.  He  tried Gabapentin 300 mg TID, stated this did not help. He is taking Diclofenac 75 mg daily for 3-4 days, then he has to skip 2-3 days in between as it affects his stomach. States this med takes away 80-90% of pain and allows him to perform normal accutivies.   .      Currently has pain is located to low back and left low back, buttock and radiates down left to calf. Once he reaches 1,000 steps he will fill the shooting pain. Reports Diclofenac reduces 80-90% of the pain. Patient is satisified with pain control, not the side effects.      MRI Lumbar Spine 2022:  FINDINGS:  For the purpose of this report, the most inferior well developed intervertebral disc space is presumed to represent L5-S1.  There is lumbar levoscoliotic curvature.  There is grade 1 retrolisthesis of L2 on L3 and L4 on L5.  Schmorl node defect contiguous endplates of L1 and L2.  Lumbar vertebrae stature is preserved.  No acute marrow edematous signal.  The visualized thoracic cord is unremarkable. The conus medullaris terminates at L1.  Right kidney T2 weighted hyperintense cystic structure.  Sacral Tarlov cyst.  Disc segmental analysis is given below:     At L1-L2, there is broad disc bulge which flattens and mildly compresses the ventral thecal sac.  Bilateral facet arthropathy.  These findings combine to cause mild central canal stenosis.  There are bilateral mild spondylotic narrowings of the neural  foramen     At L2-L3, there is broad osteophyte disc complex which compresses the ventral thecal sac.  Bilateral facet arthropathy which is more pronounced on the right.  These findings cause moderate to marked central canal stenosis.  There is marked narrowing of the right neural foramen which dorsally is encroached by facet arthropathy.  There is also moderate narrowing of the left neural foramen.     At L3-L4, there is broad osteophyte disc complex which compresses the ventral thecal sac.  Central canal stenosis is marked and is also caused by facet arthropathy.  Bilateral lateral recesses are effaced.  There is severe spondylotic narrowing of the right neural foramen.  There is also moderate to marked narrowing of the left neural canal.     At L4-L5, there is osteophyte disc complex.  Left paracentral posterior vertebral spondylosis is more pronounced.  There is also prominent left uncovertebral prominent spur like formation.  Bilateral facet arthropathy.  These findings combine to cause moderate to marked central canal stenosis.  There is mild narrowing of the right neural foramen.  The left lateral recess is effaced and there is marked narrowing of the left neural foramen.     At L5-S1, there is osteophyte disc complex which slightly indents the ventral thecal sac bilateral facet arthropathy.  There is no significant central canal stenosis.  There is mild spondylotic narrowing of the right neural foramen and moderate narrowing of the left neural canal.     Impression:     Lumbar degenerative disc disease and spondylosis level by level discussed above.           Interventional Pain History        ROS  As noted in HPI      Objective:         Physical Exam   General: Well developed; overweight; A&O x 3; No anxiety/depression; NAD  Mental Status: Oriented to person, palce and time. Displays appropriate mood & affect.  Head: Norm cephalic and atraumatic  Neck: Midline trachea  Eyes: Eyes midline with conjugate  gaze. PERRLA  ENT and mouth: normal external ear, nose, and no lesions noted on the lips.  Respiratory: Symmetrical, Unlabored  Abdomen: Non-distended + bowel sounds     Extremities:  Gen: No cyanosis or tenderness to palpation bilateral LE  Skin: Warm, pink, dry, no rashes, no lesions on the lumbar spine  Strength: 5/5 motor strength bilateral LE  ROM: Full ROM in bilateral knees . Slight instability to left knee. Bilateral Ankles without pain or instability.     Neuro:  Gait: Independent Ambulator. No altalgic lean, normal toe and heel raise  DTR's: 2+ in bilateral patellar, and ankle  Sensory: Intact to light touch bilateral lower extremities     Spine: +tenderness to palpation over bilateral buttocks L>R.      L-spine ROM:limited ROM to   extension, bilateral rotation normal.  Straight Leg Raise: normal bilaterally.       Assessment:            Encounter Diagnoses   Name Primary?    Lumbar radiculopathy Yes    Dorsalgia            Plan:       Darwin was seen today for results.     Diagnoses and all orders for this visit:     Lumbar radiculopathy     Dorsalgia        TFESI Left L5  Hold diclofenac X 1 week  Follow up post op

## 2023-01-05 NOTE — TRANSFER OF CARE
"Anesthesia Transfer of Care Note    Patient: Darwin Fletcher    Procedure(s) Performed: Procedure(s) (LRB):  INJECTION, STEROID, EPIDURAL, TRANSFORAMINAL APPROACH Left L5 (Left)    Patient location: PACU    Anesthesia Type: general    Transport from OR: Transported from OR on room air with adequate spontaneous ventilation    Post pain: adequate analgesia    Post assessment: no apparent anesthetic complications    Post vital signs: stable    Level of consciousness: awake    Nausea/Vomiting: no nausea/vomiting    Complications: none    Transfer of care protocol was followed      Last vitals:   Visit Vitals  BP (!) 167/84   Pulse 67   Temp 36.7 °C (98 °F)   Resp 20   Ht 5' 9" (1.753 m)   Wt 104.3 kg (230 lb)   SpO2 (!) 94%   BMI 33.97 kg/m²     "

## 2023-01-06 VITALS
HEART RATE: 61 BPM | WEIGHT: 230 LBS | DIASTOLIC BLOOD PRESSURE: 84 MMHG | BODY MASS INDEX: 34.07 KG/M2 | HEIGHT: 69 IN | OXYGEN SATURATION: 98 % | RESPIRATION RATE: 18 BRPM | SYSTOLIC BLOOD PRESSURE: 144 MMHG | TEMPERATURE: 98 F

## 2023-01-17 ENCOUNTER — TELEPHONE (OUTPATIENT)
Dept: INTERNAL MEDICINE | Facility: CLINIC | Age: 73
End: 2023-01-17
Payer: MEDICARE

## 2023-01-17 DIAGNOSIS — M10.9 GOUT, UNSPECIFIED CAUSE, UNSPECIFIED CHRONICITY, UNSPECIFIED SITE: Primary | ICD-10-CM

## 2023-01-17 RX ORDER — ALLOPURINOL 300 MG/1
300 TABLET ORAL DAILY
Qty: 90 TABLET | Refills: 3 | Status: SHIPPED | OUTPATIENT
Start: 2023-01-17 | End: 2023-12-14

## 2023-01-17 NOTE — TELEPHONE ENCOUNTER
sent  ----- Message from Heena May sent at 1/17/2023  9:27 AM CST -----  Regarding: med  Pt is req Ref of allopurinol  [facility]  [phone]

## 2023-01-18 NOTE — PROGRESS NOTES
Subjective:      Patient ID: Darwin Fletcher is a 72 y.o. male.    Chief Complaint: Post-op Evaluation (Follow up post op injections. States his pain is gone in his left side of his lower back but his left leg is still in a lot of pain. He is still having pains on the right side of his lower back. Patient states the injections did help about 50%. Pain is a 4/10 today 7/10 on worse day. Not taking anything for pain./)    Referred by: No ref. provider found     HPI: Patient presents as a follow-up with pain after receiving left transforaminal epidural steroid to left L5. Overall his pain is better as he is able to perform normal household chores . Reports easier to get out of bed and moving. He is able to attain goal of 6,000 steps. Pain increases with prolonged sitting. Pain to low back on right and slight radiation down to right hip and upper thigh. This pain is manageable. Overall he is pleased with pain control.      Patient has no pain on the left side of his low back; however, he continues having shooting pain down the left leg along with pain to the low back on the right.  Current pain today as 4/10 and 7/10 on worst day.He has reduced NSAIDs to 2 Aleeve's total since  his last vist.     . Reports Diclofenac reduces 80-90% of the pain    Interventional Pain History  01/05/2023:  TFESI left L5  10/26/2022:  MBB Left L4-L5 (no pain relief)    MRI L Spine 2022:  At L1-L2, there is broad disc bulge which flattens and mildly compresses the ventral thecal sac.  Bilateral facet arthropathy.  These findings combine to cause mild central canal stenosis.  There are bilateral mild spondylotic narrowings of the neural foramen     At L2-L3, there is broad osteophyte disc complex which compresses the ventral thecal sac.  Bilateral facet arthropathy which is more pronounced on the right.  These findings cause moderate to marked central canal stenosis.  There is marked narrowing of the right neural foramen which dorsally is  encroached by facet arthropathy.  There is also moderate narrowing of the left neural foramen.     At L3-L4, there is broad osteophyte disc complex which compresses the ventral thecal sac.  Central canal stenosis is marked and is also caused by facet arthropathy.  Bilateral lateral recesses are effaced.  There is severe spondylotic narrowing of the right neural foramen.  There is also moderate to marked narrowing of the left neural canal.     At L4-L5, there is osteophyte disc complex.  Left paracentral posterior vertebral spondylosis is more pronounced.  There is also prominent left uncovertebral prominent spur like formation.  Bilateral facet arthropathy.  These findings combine to cause moderate to marked central canal stenosis.  There is mild narrowing of the right neural foramen.  The left lateral recess is effaced and there is marked narrowing of the left neural foramen.     At L5-S1, there is osteophyte disc complex which slightly indents the ventral thecal sac bilateral facet arthropathy.  There is no significant central canal stenosis.  There is mild spondylotic narrowing of the right neural foramen and moderate narrowing of the left neural canal.     Impression:     Lumbar degenerative disc disease and spondylosis level by level discussed above.      ROS  Low back pain with sciatica on left      Objective:          Physical Exam      General: Well developed; overweight; A&O x 3; No anxiety/depression; NAD  Mental Status: Oriented to person, palce and time. Displays appropriate mood & affect.  Head: Norm cephalic and atraumatic  Neck: Midline trachea  Eyes: Eyes midline with conjugate gaze. PERRLA  ENT and mouth: normal external ear, nose, and no lesions noted on the lips.  Respiratory: Symmetrical, Unlabored  Abdomen: Non-distended + bowel sounds     Extremities:  Gen: No cyanosis or tenderness to palpation bilateral LE  Skin: Warm, pink, dry, no rashes, no lesions on the lumbar spine  Strength: 5/5 motor  strength bilateral LE  ROM: Full ROM in bilateral knees . Slight instability to left knee. Bilateral Ankles without pain or instability.     Neuro:  Gait: Independent Ambulator. No altalgic lean, normal toe and heel raise  DTR's: 2+ in bilateral patellar, and ankle  Sensory: Intact to light touch bilateral lower extremities     Spine: +tenderness to palpation over bilateral buttocks right only      L-spine ROM:limited ROM to   extension, bilateral rotation. Normal flesion  Straight Leg Raise: normal bilaterally.   Assessment:       Encounter Diagnoses   Name Primary?    Lumbar radiculopathy Yes    Dorsalgia          Plan:       Darwin was seen today for post-op evaluation.    Diagnoses and all orders for this visit:    Lumbar radiculopathy    Dorsalgia       Patient is satisfied with his pain. He will call back as needed  Continue walking with 6,000 steps daily  Mediterranean diet and plant based meals  Losing weight and maintaining to reduce pain.  Congratulated patient on his progress with weight loss and pain control

## 2023-01-19 ENCOUNTER — OFFICE VISIT (OUTPATIENT)
Dept: PAIN MEDICINE | Facility: CLINIC | Age: 73
End: 2023-01-19
Payer: MEDICARE

## 2023-01-19 VITALS
WEIGHT: 230 LBS | SYSTOLIC BLOOD PRESSURE: 155 MMHG | HEIGHT: 69 IN | DIASTOLIC BLOOD PRESSURE: 76 MMHG | HEART RATE: 58 BPM | BODY MASS INDEX: 34.07 KG/M2

## 2023-01-19 DIAGNOSIS — M54.9 DORSALGIA: ICD-10-CM

## 2023-01-19 DIAGNOSIS — M54.16 LUMBAR RADICULOPATHY: Primary | ICD-10-CM

## 2023-01-19 PROCEDURE — 99214 OFFICE O/P EST MOD 30 MIN: CPT | Mod: ,,, | Performed by: NURSE PRACTITIONER

## 2023-01-19 PROCEDURE — 99214 PR OFFICE/OUTPT VISIT, EST, LEVL IV, 30-39 MIN: ICD-10-PCS | Mod: ,,, | Performed by: NURSE PRACTITIONER

## 2023-01-23 ENCOUNTER — TELEPHONE (OUTPATIENT)
Dept: INTERNAL MEDICINE | Facility: CLINIC | Age: 73
End: 2023-01-23
Payer: MEDICARE

## 2023-01-23 DIAGNOSIS — I10 HYPERTENSION, UNSPECIFIED TYPE: Primary | ICD-10-CM

## 2023-01-23 RX ORDER — LISINOPRIL 10 MG/1
10 TABLET ORAL 2 TIMES DAILY
Qty: 90 TABLET | Refills: 2 | Status: SHIPPED | OUTPATIENT
Start: 2023-01-23 | End: 2023-05-01 | Stop reason: SDUPTHER

## 2023-02-28 ENCOUNTER — TELEPHONE (OUTPATIENT)
Dept: INTERNAL MEDICINE | Facility: CLINIC | Age: 73
End: 2023-02-28
Payer: MEDICARE

## 2023-02-28 DIAGNOSIS — E03.9 HYPOTHYROIDISM, UNSPECIFIED TYPE: Primary | ICD-10-CM

## 2023-02-28 RX ORDER — LEVOTHYROXINE SODIUM 137 UG/1
137 TABLET ORAL DAILY
Qty: 30 TABLET | Refills: 6 | Status: SHIPPED | OUTPATIENT
Start: 2023-02-28 | End: 2023-05-03

## 2023-03-06 DIAGNOSIS — E03.9 HYPOTHYROIDISM, UNSPECIFIED TYPE: Primary | ICD-10-CM

## 2023-03-06 DIAGNOSIS — E11.42 TYPE 2 DIABETES MELLITUS WITH DIABETIC POLYNEUROPATHY, WITHOUT LONG-TERM CURRENT USE OF INSULIN: ICD-10-CM

## 2023-03-06 DIAGNOSIS — I10 HYPERTENSION, UNSPECIFIED TYPE: ICD-10-CM

## 2023-03-06 DIAGNOSIS — E78.5 HYPERLIPIDEMIA, UNSPECIFIED HYPERLIPIDEMIA TYPE: ICD-10-CM

## 2023-03-08 ENCOUNTER — LAB VISIT (OUTPATIENT)
Dept: LAB | Facility: HOSPITAL | Age: 73
End: 2023-03-08
Attending: INTERNAL MEDICINE
Payer: MEDICARE

## 2023-03-08 DIAGNOSIS — E11.42 TYPE 2 DIABETES MELLITUS WITH DIABETIC POLYNEUROPATHY, WITHOUT LONG-TERM CURRENT USE OF INSULIN: ICD-10-CM

## 2023-03-08 DIAGNOSIS — E78.5 HYPERLIPIDEMIA, UNSPECIFIED HYPERLIPIDEMIA TYPE: ICD-10-CM

## 2023-03-08 DIAGNOSIS — E03.9 HYPOTHYROIDISM, UNSPECIFIED TYPE: ICD-10-CM

## 2023-03-08 DIAGNOSIS — I10 HYPERTENSION, UNSPECIFIED TYPE: ICD-10-CM

## 2023-03-08 LAB
ALBUMIN SERPL-MCNC: 4.1 G/DL (ref 3.4–4.8)
ALBUMIN/GLOB SERPL: 1.7 RATIO (ref 1.1–2)
ALP SERPL-CCNC: 29 UNIT/L (ref 40–150)
ALT SERPL-CCNC: 20 UNIT/L (ref 0–55)
APPEARANCE UR: CLEAR
AST SERPL-CCNC: 22 UNIT/L (ref 5–34)
BACTERIA #/AREA URNS AUTO: NORMAL /HPF
BASOPHILS # BLD AUTO: 0.05 X10(3)/MCL (ref 0–0.2)
BASOPHILS NFR BLD AUTO: 0.9 %
BILIRUB UR QL STRIP.AUTO: NEGATIVE MG/DL
BILIRUBIN DIRECT+TOT PNL SERPL-MCNC: 0.5 MG/DL
BUN SERPL-MCNC: 15.3 MG/DL (ref 8.4–25.7)
CALCIUM SERPL-MCNC: 10.1 MG/DL (ref 8.8–10)
CHLORIDE SERPL-SCNC: 105 MMOL/L (ref 98–107)
CHOLEST SERPL-MCNC: 141 MG/DL
CHOLEST/HDLC SERPL: 3 {RATIO} (ref 0–5)
CO2 SERPL-SCNC: 28 MMOL/L (ref 23–31)
COLOR UR AUTO: YELLOW
CREAT SERPL-MCNC: 1.02 MG/DL (ref 0.73–1.18)
EOSINOPHIL # BLD AUTO: 0.33 X10(3)/MCL (ref 0–0.9)
EOSINOPHIL NFR BLD AUTO: 5.7 %
ERYTHROCYTE [DISTWIDTH] IN BLOOD BY AUTOMATED COUNT: 13.3 % (ref 11.5–17)
EST. AVERAGE GLUCOSE BLD GHB EST-MCNC: 96.8 MG/DL
GFR SERPLBLD CREATININE-BSD FMLA CKD-EPI: >60 MLS/MIN/1.73/M2
GLOBULIN SER-MCNC: 2.4 GM/DL (ref 2.4–3.5)
GLUCOSE SERPL-MCNC: 103 MG/DL (ref 82–115)
GLUCOSE UR QL STRIP.AUTO: NEGATIVE MG/DL
HBA1C MFR BLD: 5 %
HCT VFR BLD AUTO: 40.9 % (ref 42–52)
HDLC SERPL-MCNC: 43 MG/DL (ref 35–60)
HGB BLD-MCNC: 13.2 G/DL (ref 14–18)
IMM GRANULOCYTES # BLD AUTO: 0.05 X10(3)/MCL (ref 0–0.04)
IMM GRANULOCYTES NFR BLD AUTO: 0.9 %
KETONES UR QL STRIP.AUTO: NEGATIVE MG/DL
LDLC SERPL CALC-MCNC: 84 MG/DL (ref 50–140)
LEUKOCYTE ESTERASE UR QL STRIP.AUTO: NEGATIVE UNIT/L
LYMPHOCYTES # BLD AUTO: 1.14 X10(3)/MCL (ref 0.6–4.6)
LYMPHOCYTES NFR BLD AUTO: 19.6 %
MCH RBC QN AUTO: 30.5 PG
MCHC RBC AUTO-ENTMCNC: 32.3 G/DL (ref 33–36)
MCV RBC AUTO: 94.5 FL (ref 80–94)
MONOCYTES # BLD AUTO: 0.43 X10(3)/MCL (ref 0.1–1.3)
MONOCYTES NFR BLD AUTO: 7.4 %
NEUTROPHILS # BLD AUTO: 3.81 X10(3)/MCL (ref 2.1–9.2)
NEUTROPHILS NFR BLD AUTO: 65.5 %
NITRITE UR QL STRIP.AUTO: NEGATIVE
NRBC BLD AUTO-RTO: 0 %
PH UR STRIP.AUTO: 5.5 [PH]
PLATELET # BLD AUTO: 199 X10(3)/MCL (ref 130–400)
PMV BLD AUTO: 9.6 FL (ref 7.4–10.4)
POTASSIUM SERPL-SCNC: 4.9 MMOL/L (ref 3.5–5.1)
PROT SERPL-MCNC: 6.5 GM/DL (ref 5.8–7.6)
PROT UR QL STRIP.AUTO: NEGATIVE MG/DL
RBC # BLD AUTO: 4.33 X10(6)/MCL (ref 4.7–6.1)
RBC #/AREA URNS AUTO: <5 /HPF
RBC UR QL AUTO: NEGATIVE UNIT/L
SODIUM SERPL-SCNC: 141 MMOL/L (ref 136–145)
SP GR UR STRIP.AUTO: 1.02 (ref 1–1.03)
SQUAMOUS #/AREA URNS AUTO: <5 /HPF
TRIGL SERPL-MCNC: 72 MG/DL (ref 34–140)
UROBILINOGEN UR STRIP-ACNC: 1 MG/DL
VLDLC SERPL CALC-MCNC: 14 MG/DL
WBC # SPEC AUTO: 5.8 X10(3)/MCL (ref 4.5–11.5)
WBC #/AREA URNS AUTO: <5 /HPF

## 2023-03-08 PROCEDURE — 81001 URINALYSIS AUTO W/SCOPE: CPT

## 2023-03-08 PROCEDURE — 36415 COLL VENOUS BLD VENIPUNCTURE: CPT

## 2023-03-08 PROCEDURE — 83036 HEMOGLOBIN GLYCOSYLATED A1C: CPT

## 2023-03-08 PROCEDURE — 80053 COMPREHEN METABOLIC PANEL: CPT

## 2023-03-08 PROCEDURE — 80061 LIPID PANEL: CPT

## 2023-03-08 PROCEDURE — 85025 COMPLETE CBC W/AUTO DIFF WBC: CPT

## 2023-03-13 ENCOUNTER — OFFICE VISIT (OUTPATIENT)
Dept: INTERNAL MEDICINE | Facility: CLINIC | Age: 73
End: 2023-03-13
Payer: MEDICARE

## 2023-03-13 VITALS
DIASTOLIC BLOOD PRESSURE: 60 MMHG | OXYGEN SATURATION: 97 % | WEIGHT: 230 LBS | SYSTOLIC BLOOD PRESSURE: 120 MMHG | HEART RATE: 71 BPM | HEIGHT: 69 IN | BODY MASS INDEX: 34.07 KG/M2

## 2023-03-13 DIAGNOSIS — E11.42 TYPE 2 DIABETES MELLITUS WITH DIABETIC POLYNEUROPATHY, WITHOUT LONG-TERM CURRENT USE OF INSULIN: ICD-10-CM

## 2023-03-13 DIAGNOSIS — E78.5 HYPERLIPIDEMIA, UNSPECIFIED HYPERLIPIDEMIA TYPE: ICD-10-CM

## 2023-03-13 DIAGNOSIS — E03.9 HYPOTHYROIDISM, UNSPECIFIED TYPE: Primary | ICD-10-CM

## 2023-03-13 DIAGNOSIS — M10.9 GOUT, UNSPECIFIED CAUSE, UNSPECIFIED CHRONICITY, UNSPECIFIED SITE: Chronic | ICD-10-CM

## 2023-03-13 DIAGNOSIS — I10 HYPERTENSION, UNSPECIFIED TYPE: ICD-10-CM

## 2023-03-13 PROBLEM — E79.0 HYPERURICEMIA WITHOUT SIGNS OF INFLAMMATORY ARTHRITIS AND TOPHACEOUS DISEASE: Status: ACTIVE | Noted: 2023-03-13

## 2023-03-13 PROBLEM — M25.476 SWELLING OF FIRST METATARSOPHALANGEAL (MTP) JOINT: Status: ACTIVE | Noted: 2023-03-13

## 2023-03-13 PROCEDURE — 99214 PR OFFICE/OUTPT VISIT, EST, LEVL IV, 30-39 MIN: ICD-10-PCS | Mod: ,,, | Performed by: INTERNAL MEDICINE

## 2023-03-13 PROCEDURE — 99214 OFFICE O/P EST MOD 30 MIN: CPT | Mod: ,,, | Performed by: INTERNAL MEDICINE

## 2023-03-13 RX ORDER — DICLOFENAC SODIUM 75 MG/1
75 TABLET, DELAYED RELEASE ORAL 2 TIMES DAILY
Qty: 180 TABLET | Refills: 2 | Status: SHIPPED | OUTPATIENT
Start: 2023-03-13 | End: 2024-02-05 | Stop reason: SDUPTHER

## 2023-03-13 NOTE — PROGRESS NOTES
Subjective:      Patient ID: Darwin Fletcher is a 72 y.o. male.    Chief Complaint: Follow-up (6 month)      HPI:This patient is an established patient. His active problem list and current medication will be reviewed at the time of this visit. This patient's medical illnesses have been well recognized and documented in his chart. A review of systems will be taken at the time of his visit, and any new information necessary for care will be documented. This patient has done well since his last visit to the office.  He has been compliant in taking medication, and refilling his medication on a regular schedule. He had laboratory studies drawn prior to this office visit, and I took the liberty to review these results with him in detail. I have given him a hand written explanation of the results for his records.    This patient is a 72-year-old established patient who has done well since his last visit to the office of.  He would like the name of the orthopedic surgeon who did my prior knee surgery, and I will share this information with him.  He and I reviewed his current medication, which includes diclofenac, which she finds works well for his arthritic pain.  He takes at 1 tablet per day as needed for his symptoms, and he is getting relief with that dose alone.      This patient continues to lead an active lifestyle, and he is engaged in many activities with his immediate family.  I encouraged him to continue to do so.       The patient's Health Maintenance was reviewed and the following appears to be due at this time:   Health Maintenance Due   Topic Date Due    Hepatitis C Screening  Never done    TETANUS VACCINE  Never done    High Dose Statin  Never done    Shingles Vaccine (2 of 2) 12/23/2020        Recent Labwork  Lab Visit on 03/08/2023   Component Date Value Ref Range Status    Sodium Level 03/08/2023 141  136 - 145 mmol/L Final    Potassium Level 03/08/2023 4.9  3.5 - 5.1 mmol/L Final    Chloride 03/08/2023  105  98 - 107 mmol/L Final    Carbon Dioxide 03/08/2023 28  23 - 31 mmol/L Final    Glucose Level 03/08/2023 103  82 - 115 mg/dL Final    Blood Urea Nitrogen 03/08/2023 15.3  8.4 - 25.7 mg/dL Final    Creatinine 03/08/2023 1.02  0.73 - 1.18 mg/dL Final    Calcium Level Total 03/08/2023 10.1 (H)  8.8 - 10.0 mg/dL Final    Protein Total 03/08/2023 6.5  5.8 - 7.6 gm/dL Final    Albumin Level 03/08/2023 4.1  3.4 - 4.8 g/dL Final    Globulin 03/08/2023 2.4  2.4 - 3.5 gm/dL Final    Albumin/Globulin Ratio 03/08/2023 1.7  1.1 - 2.0 ratio Final    Bilirubin Total 03/08/2023 0.5  <=1.5 mg/dL Final    Alkaline Phosphatase 03/08/2023 29 (L)  40 - 150 unit/L Final    Alanine Aminotransferase 03/08/2023 20  0 - 55 unit/L Final    Aspartate Aminotransferase 03/08/2023 22  5 - 34 unit/L Final    eGFR 03/08/2023 >60  mls/min/1.73/m2 Final    Cholesterol Total 03/08/2023 141  <=200 mg/dL Final    HDL Cholesterol 03/08/2023 43  35 - 60 mg/dL Final    Triglyceride 03/08/2023 72  34 - 140 mg/dL Final    Cholesterol/HDL Ratio 03/08/2023 3  0 - 5 Final    Very Low Density Lipoprotein 03/08/2023 14   Final    LDL Cholesterol 03/08/2023 84.00  50.00 - 140.00 mg/dL Final    Color, UA 03/08/2023 Yellow  Yellow, Light-Yellow, Dark Yellow, Alee, Straw Final    Appearance, UA 03/08/2023 Clear  Clear Final    Specific Gravity, UA 03/08/2023 1.019  1.005 - 1.030 Final    pH, UA 03/08/2023 5.5  5.0 - 8.5 Final    Protein, UA 03/08/2023 Negative  Negative mg/dL Final    Glucose, UA 03/08/2023 Negative  Negative, Normal mg/dL Final    Ketones, UA 03/08/2023 Negative  Negative mg/dL Final    Blood, UA 03/08/2023 Negative  Negative unit/L Final    Bilirubin, UA 03/08/2023 Negative  Negative mg/dL Final    Urobilinogen, UA 03/08/2023 1.0  0.2, 1.0, Normal mg/dL Final    Nitrites, UA 03/08/2023 Negative  Negative Final    Leukocyte Esterase, UA 03/08/2023 Negative  Negative unit/L Final    Hemoglobin A1c 03/08/2023 5.0  <=7.0 % Final    Estimated  Average Glucose 03/08/2023 96.8  mg/dL Final    WBC 03/08/2023 5.8  4.5 - 11.5 x10(3)/mcL Final    RBC 03/08/2023 4.33 (L)  4.70 - 6.10 x10(6)/mcL Final    Hgb 03/08/2023 13.2 (L)  14.0 - 18.0 g/dL Final    Hct 03/08/2023 40.9 (L)  42.0 - 52.0 % Final    MCV 03/08/2023 94.5 (H)  80.0 - 94.0 fL Final    MCH 03/08/2023 30.5  pg Final    MCHC 03/08/2023 32.3 (L)  33.0 - 36.0 g/dL Final    RDW 03/08/2023 13.3  11.5 - 17.0 % Final    Platelet 03/08/2023 199  130 - 400 x10(3)/mcL Final    MPV 03/08/2023 9.6  7.4 - 10.4 fL Final    Neut % 03/08/2023 65.5  % Final    Lymph % 03/08/2023 19.6  % Final    Mono % 03/08/2023 7.4  % Final    Eos % 03/08/2023 5.7  % Final    Basophil % 03/08/2023 0.9  % Final    Lymph # 03/08/2023 1.14  0.6 - 4.6 x10(3)/mcL Final    Neut # 03/08/2023 3.81  2.1 - 9.2 x10(3)/mcL Final    Mono # 03/08/2023 0.43  0.1 - 1.3 x10(3)/mcL Final    Eos # 03/08/2023 0.33  0 - 0.9 x10(3)/mcL Final    Baso # 03/08/2023 0.05  0 - 0.2 x10(3)/mcL Final    IG# 03/08/2023 0.05 (H)  0 - 0.04 x10(3)/mcL Final    IG% 03/08/2023 0.9  % Final    NRBC% 03/08/2023 0.0  % Final    RBC, UA 03/08/2023 <5  <=5 /HPF Final    WBC, UA 03/08/2023 <5  <=5 /HPF Final    Squamous Epithelial Cells, UA 03/08/2023 <5  <=5 /HPF Final    Bacteria, UA 03/08/2023 None Seen  None Seen, Rare, Occasional /HPF Final       Past Medical History:  Past Medical History:   Diagnosis Date    Arthritis     Bulging of lumbar intervertebral disc     CAD (coronary artery disease)     Gout 06/06/2022    Hypertension 06/06/2022    Hypothyroidism 06/06/2022    Personal history of colonic polyps     Sleep apnea 06/06/2022    uses CPAP uses CPAP    Type 2 diabetes mellitus with diabetic polyneuropathy, without long-term current use of insulin 06/06/2022     Past Surgical History:   Procedure Laterality Date    CHOLECYSTECTOMY      COLONOSCOPY  03/17/2015    COLONOSCOPY Bilateral 07/20/2020    CORONARY ANGIOPLASTY WITH STENT PLACEMENT N/A     Excision of Skin  Cancer (Arm)  N/A     EYE SURGERY      INJECTION OF ANESTHETIC AGENT AROUND MEDIAL BRANCH NERVES INNERVATING LUMBAR FACET JOINT N/A 10/26/2022    Procedure: Block-nerve-medial branch-lumbar;  Surgeon: Samreen Horne MD;  Location: [facility] OR;  Service: Pain Management;  Laterality: N/A;  Left L4/L5    KNEE ARTHROSCOPY Left     TRANSFORAMINAL EPIDURAL INJECTION OF STEROID Left 1/5/2023    Procedure: INJECTION, STEROID, EPIDURAL, TRANSFORAMINAL APPROACH Left L5;  Surgeon: Samreen Horne MD;  Location: [facility] OR;  Service: Pain Management;  Laterality: Left;  L5    VASECTOMY N/A      Review of patient's allergies indicates:  No Known Allergies  Current Outpatient Medications on File Prior to Visit   Medication Sig Dispense Refill    allopurinoL (ZYLOPRIM) 300 MG tablet Take 1 tablet (300 mg total) by mouth once daily. 90 tablet 3    aspirin 81 mg Cap Take 1 tablet by mouth once daily.      fenofibrate 160 MG Tab Take 1 tablet (160 mg total) by mouth once daily. 90 tablet 3    gabapentin (NEURONTIN) 300 MG capsule Take 1 capsule (300 mg total) by mouth 2 (two) times daily. 60 capsule 3    glimepiride (AMARYL) 2 MG tablet Take 0.5 tablets (1 mg total) by mouth 2 (two) times a day. 30 tablet 11    glucosamine-chondroitin 250-200 mg Tab Take 1 tablet by mouth Daily.      iron-vit c-b12-folic acid (IRON 100 PLUS) Tab 1 tablet Daily.      levothyroxine (SYNTHROID) 137 MCG Tab tablet Take 1 tablet (137 mcg total) by mouth once daily. 30 tablet 6    lisinopriL 10 MG tablet Take 1 tablet (10 mg total) by mouth 2 (two) times daily. 90 tablet 2    metFORMIN (GLUCOPHAGE) 500 MG tablet Take 1/2 tablet bid (Patient taking differently: Take 250 mg by mouth 2 (two) times daily with meals. Take 1/2 tablet bid) 30 tablet 12    omeprazole (PRILOSEC) 40 MG capsule Take 1 capsule (40 mg total) by mouth once daily. 90 capsule 3    traZODone (DESYREL) 50 MG tablet Take 1 tablet (50 mg total) by mouth every evening. 30 tablet 5     "[DISCONTINUED] diclofenac (VOLTAREN) 75 MG EC tablet Take 1 tablet (75 mg total) by mouth 2 (two) times daily. 60 tablet 2     No current facility-administered medications on file prior to visit.     Social History     Socioeconomic History    Marital status:    Tobacco Use    Smoking status: Former     Packs/day: 2.00     Years: 35.00     Pack years: 70.00     Types: Cigarettes    Smokeless tobacco: Never   Substance and Sexual Activity    Alcohol use: Yes     Alcohol/week: 9.0 standard drinks     Types: 2 Glasses of wine, 2 Cans of beer, 3 Shots of liquor, 2 Drinks containing 0.5 oz of alcohol per week     Comment: twice weekly    Drug use: Never    Sexual activity: Yes     Family History   Problem Relation Age of Onset    Heart attack Mother     Lung cancer Father        Review of Systems  Review of Systems   Constitutional: Negative.    HENT: Negative.    Eyes: Negative.    Respiratory: Negative.    Cardiovascular: Negative.    Gastrointestinal: Negative.    Genitourinary: Negative.    Musculoskeletal: Negative.    Neurological: Negative.    Endo/Heme/Allergies: Negative.    Psychiatric/Behavioral: Negative.    Objective:   /60   Pulse 71   Ht 5' 9" (1.753 m)   Wt 104.3 kg (230 lb)   SpO2 97%   BMI 33.97 kg/m²         Physical Exam  Vitals and nursing note reviewed.   Constitutional:       General: He is not in acute distress.     Appearance: Normal appearance.   HENT:      Head: Normocephalic and atraumatic.      Right Ear: Tympanic membrane and ear canal normal.      Left Ear: Tympanic membrane and ear canal normal.      Nose: Nose normal.      Mouth/Throat:      Pharynx: Oropharynx is clear. No oropharyngeal exudate or posterior oropharyngeal erythema.   Eyes:      Extraocular Movements: Extraocular movements intact.      Pupils: Pupils are equal, round, and reactive to light.   Cardiovascular:      Rate and Rhythm: Normal rate and regular rhythm.      Pulses: Normal pulses.      Heart " sounds: Normal heart sounds. No murmur heard.    No friction rub. No gallop.   Pulmonary:      Effort: Pulmonary effort is normal. No respiratory distress.      Breath sounds: Normal breath sounds.   Abdominal:      General: Abdomen is flat. Bowel sounds are normal.      Palpations: Abdomen is soft.   Genitourinary:     Rectum: Normal.   Musculoskeletal:         General: Normal range of motion.      Cervical back: Normal range of motion and neck supple.   Skin:     General: Skin is warm.      Capillary Refill: Capillary refill takes less than 2 seconds.   Neurological:      General: No focal deficit present.      Mental Status: He is alert and oriented to person, place, and time.   Psychiatric:         Mood and Affect: Mood normal.         Behavior: Behavior normal.         Thought Content: Thought content normal.         Judgment: Judgment normal.   Assessment:     1. Hypothyroidism, unspecified type    2. Gout, unspecified cause, unspecified chronicity, unspecified site    3. Hypertension, unspecified type    4. Hyperlipidemia, unspecified hyperlipidemia type    5. Type 2 diabetes mellitus with diabetic polyneuropathy, without long-term current use of insulin      Plan:   I am having Darwin KHANAmeena Fletcher maintain his aspirin, glucosamine-chondroitin, IRON 100 PLUS, glimepiride, metFORMIN, traZODone, gabapentin, omeprazole, fenofibrate, allopurinoL, lisinopriL, levothyroxine, and diclofenac.This patient is an established patient who has come in for an examination. He has done well since his last visit to the office. He has a negative review of systems, except as recorded above. He has not had any new problems to develop in the recent past. I was able to review his laboratory studies with him, as mentioned in the history of present illness. I will make medication changes as needed, and his follow-up will continue as before.  I was able to review this patient's laboratory studies with him in detail, and I have given him  hand written explanation of the results for his records.  Each of the medication previously given will be continued as before.      This patient did not have a thyroid test done with his usual laboratory studies.      This patient should continue to take all medication chronically given for known medical illnesses.    I discussed blood pressure management strategies with the patient today. I also recommend a low salt and low pork diet. We discussed antihypertensive medication. I have stressed an increase in physical activity and exercise.  Exercise is defined as 30 minutes of sustained activity performed at least 3 or 4 days each week.    I held a discussion about cholesterol management with the patient today. The patient understands better than before and will try to change the medication regimen and diet.  Medication taken to lower cholesterol are best taken at bedtime.    I have made recommendations regarding better glycemic control with this patient today. The goal is to keep the blood sugar under a hemoglobin A1c of 7. Diet, medication, an increase in exercise or physical activity has been stressed.    30 minutes of total time spent on the encounter, which includes face to face time and non-face to face time preparing to see the patient, obtaining and/or reviewing separately obtained history, documenting clinical information in the electronic or other health record, independently interpreting results and communicating results to the patient/family/caregiver, or care coordination            Problem List Items Addressed This Visit          Cardiac/Vascular    Hypertension (Chronic)       Endocrine    Type 2 diabetes mellitus with diabetic polyneuropathy, without long-term current use of insulin (Chronic)    Hypothyroidism - Primary (Chronic)       Orthopedic    Gout (Chronic)    Relevant Medications    diclofenac (VOLTAREN) 75 MG EC tablet     Other Visit Diagnoses       Hyperlipidemia, unspecified hyperlipidemia  michael Granados was seen today for follow-up.    Diagnoses and all orders for this visit:    Hypothyroidism, unspecified type    Gout, unspecified cause, unspecified chronicity, unspecified site  -     diclofenac (VOLTAREN) 75 MG EC tablet; Take 1 tablet (75 mg total) by mouth 2 (two) times daily.    Hypertension, unspecified type    Hyperlipidemia, unspecified hyperlipidemia type    Type 2 diabetes mellitus with diabetic polyneuropathy, without long-term current use of insulin

## 2023-04-27 ENCOUNTER — OFFICE VISIT (OUTPATIENT)
Dept: INTERNAL MEDICINE | Facility: CLINIC | Age: 73
End: 2023-04-27
Payer: MEDICARE

## 2023-04-27 VITALS
SYSTOLIC BLOOD PRESSURE: 122 MMHG | DIASTOLIC BLOOD PRESSURE: 68 MMHG | BODY MASS INDEX: 35.68 KG/M2 | WEIGHT: 241.63 LBS | OXYGEN SATURATION: 96 % | HEART RATE: 71 BPM | RESPIRATION RATE: 18 BRPM | TEMPERATURE: 98 F

## 2023-04-27 DIAGNOSIS — G47.00 INSOMNIA, UNSPECIFIED TYPE: ICD-10-CM

## 2023-04-27 DIAGNOSIS — E03.9 HYPOTHYROIDISM, UNSPECIFIED TYPE: Primary | Chronic | ICD-10-CM

## 2023-04-27 PROCEDURE — 99214 PR OFFICE/OUTPT VISIT, EST, LEVL IV, 30-39 MIN: ICD-10-PCS | Mod: ,,,

## 2023-04-27 PROCEDURE — 99214 OFFICE O/P EST MOD 30 MIN: CPT | Mod: ,,,

## 2023-04-27 RX ORDER — TRAZODONE HYDROCHLORIDE 50 MG/1
50 TABLET ORAL NIGHTLY
Qty: 30 TABLET | Refills: 5 | Status: SHIPPED | OUTPATIENT
Start: 2023-04-27 | End: 2023-04-27

## 2023-04-27 RX ORDER — TRAZODONE HYDROCHLORIDE 50 MG/1
50 TABLET ORAL NIGHTLY
Qty: 90 TABLET | Refills: 1 | Status: SHIPPED | OUTPATIENT
Start: 2023-04-27 | End: 2023-07-13 | Stop reason: SDUPTHER

## 2023-04-27 NOTE — PROGRESS NOTES
Patient ID: Darwin Fletcher is a 72 y.o. male.    Chief Complaint: Medication Refill (Pt doing well only need med refills )    72-year-old male here today for medication review/refill visit as a Dr. Smith patient.  Does have new patient appointment with Dr. Negrete,since prior PCP recently retired.   Since last visit , patient reports to have been fairly well without acute injury or major illness. today presents for request of refill on trazodone.  States doing well on trazodone 50 mg nightly for insomnia symptoms. History also significant for hypothyroidism currently on levothyroxine 137 mcg daily.  However, No recent TSH level noted, repeat level ordered.  patient understanding to have completed.  Otherwise patient fairly stable without any acute complaints today.      MEDICAL HISTORY:    Past Medical History:   Diagnosis Date    Arthritis     Bulging of lumbar intervertebral disc     CAD (coronary artery disease)     Gout 06/06/2022    Hypertension 06/06/2022    Hypothyroidism 06/06/2022    Personal history of colonic polyps     Sleep apnea 06/06/2022    uses CPAP uses CPAP    Type 2 diabetes mellitus with diabetic polyneuropathy, without long-term current use of insulin 06/06/2022      Past Surgical History:   Procedure Laterality Date    CHOLECYSTECTOMY      COLONOSCOPY  03/17/2015    COLONOSCOPY Bilateral 07/20/2020    CORONARY ANGIOPLASTY WITH STENT PLACEMENT N/A     Excision of Skin Cancer (Arm)  N/A     EYE SURGERY      INJECTION OF ANESTHETIC AGENT AROUND MEDIAL BRANCH NERVES INNERVATING LUMBAR FACET JOINT N/A 10/26/2022    Procedure: Block-nerve-medial branch-lumbar;  Surgeon: Samreen Horne MD;  Location: [facility] OR;  Service: Pain Management;  Laterality: N/A;  Left L4/L5    KNEE ARTHROSCOPY Left     TRANSFORAMINAL EPIDURAL INJECTION OF STEROID Left 1/5/2023    Procedure: INJECTION, STEROID, EPIDURAL, TRANSFORAMINAL APPROACH Left L5;  Surgeon: Samreen Horne MD;  Location: [facility] OR;  Service: Pain  Management;  Laterality: Left;  L5    VASECTOMY N/A       Social History     Tobacco Use    Smoking status: Former     Packs/day: 2.00     Years: 35.00     Pack years: 70.00     Types: Cigarettes    Smokeless tobacco: Never   Substance Use Topics    Alcohol use: Yes     Alcohol/week: 9.0 standard drinks     Types: 2 Glasses of wine, 2 Cans of beer, 3 Shots of liquor, 2 Drinks containing 0.5 oz of alcohol per week     Comment: twice weekly    Drug use: Never          Health Maintenance Due   Topic Date Due    Hepatitis C Screening  Never done    Foot Exam  Never done    TETANUS VACCINE  Never done    Shingles Vaccine (2 of 2) 12/23/2020          Patient Care Team:  Kana Smith Jr., MD as PCP - General (Internal Medicine)  MD SARAI Damon Jr., MD as Consulting Physician (Gastroenterology)      Review of Systems   Constitutional:  Negative for fatigue and fever.   HENT:  Negative for congestion, rhinorrhea, sore throat and trouble swallowing.    Eyes:  Negative for redness and visual disturbance.   Respiratory:  Negative for cough, chest tightness and shortness of breath.    Cardiovascular:  Negative for chest pain and palpitations.   Gastrointestinal:  Negative for abdominal pain, constipation, diarrhea, nausea and vomiting.   Genitourinary:  Negative for dysuria, flank pain, frequency and urgency.   Musculoskeletal:  Negative for arthralgias, gait problem and myalgias.   Skin:  Negative for rash and wound.   Neurological:  Negative for facial asymmetry, speech difficulty, weakness and headaches.   All other systems reviewed and are negative.    Objective:   /68 (BP Location: Left arm, Patient Position: Sitting, BP Method: Large (Automatic))   Pulse 71   Temp 97.7 °F (36.5 °C) (Temporal)   Resp 18   Wt 109.6 kg (241 lb 9.6 oz)   SpO2 96%   BMI 35.68 kg/m²      Physical Exam  Constitutional:       General: He is not in acute distress.     Appearance: Normal appearance.    HENT:      Right Ear: Tympanic membrane, ear canal and external ear normal.      Left Ear: Tympanic membrane, ear canal and external ear normal.      Nose: Nose normal.      Mouth/Throat:      Mouth: Mucous membranes are moist.      Pharynx: Oropharynx is clear.   Eyes:      Extraocular Movements: Extraocular movements intact.      Conjunctiva/sclera: Conjunctivae normal.      Pupils: Pupils are equal, round, and reactive to light.   Cardiovascular:      Rate and Rhythm: Normal rate and regular rhythm.      Pulses: Normal pulses.      Heart sounds: Normal heart sounds. No murmur heard.    No gallop.   Pulmonary:      Effort: Pulmonary effort is normal.      Breath sounds: Normal breath sounds. No wheezing.   Abdominal:      General: Bowel sounds are normal. There is no distension.      Palpations: Abdomen is soft. There is no mass.      Tenderness: There is no abdominal tenderness. There is no guarding.   Musculoskeletal:         General: Normal range of motion.   Skin:     General: Skin is warm and dry.   Neurological:      Mental Status: He is alert. Mental status is at baseline.      Sensory: No sensory deficit.      Motor: No weakness.         Assessment:       ICD-10-CM ICD-9-CM   1. Hypothyroidism, unspecified type  E03.9 244.9   2. Insomnia, unspecified type  G47.00 780.52        Plan:     Problem List Items Addressed This Visit          Endocrine    Hypothyroidism - Primary (Chronic)     -no recent TSH level, order   -currently on levothyroxine 137 mcg, continue and titrate as needed           Relevant Orders    TSH       Other    Insomnia     -stable  -currently on trazodone 50 mg daily and requesting refill, medications sent to pharmacy           Relevant Medications    traZODone (DESYREL) 50 MG tablet          Follow up for Previously scheduled and PRN if need.   -plan specifics discussed above    Orders Placed This Encounter    TSH    traZODone (DESYREL) 50 MG tablet        Medication List with  Changes/Refills   Current Medications    ALLOPURINOL (ZYLOPRIM) 300 MG TABLET    Take 1 tablet (300 mg total) by mouth once daily.    ASPIRIN 81 MG CAP    Take 1 tablet by mouth once daily.    DICLOFENAC (VOLTAREN) 75 MG EC TABLET    Take 1 tablet (75 mg total) by mouth 2 (two) times daily.    FENOFIBRATE 160 MG TAB    Take 1 tablet (160 mg total) by mouth once daily.    GABAPENTIN (NEURONTIN) 300 MG CAPSULE    Take 1 capsule (300 mg total) by mouth 2 (two) times daily.    GLIMEPIRIDE (AMARYL) 2 MG TABLET    Take 0.5 tablets (1 mg total) by mouth 2 (two) times a day.    GLUCOSAMINE-CHONDROITIN 250-200 MG TAB    Take 1 tablet by mouth Daily.    IRON-VIT C-B12-FOLIC ACID (IRON 100 PLUS) TAB    1 tablet Daily.    LEVOTHYROXINE (SYNTHROID) 137 MCG TAB TABLET    Take 1 tablet (137 mcg total) by mouth once daily.    METFORMIN (GLUCOPHAGE) 500 MG TABLET    Take 1/2 tablet bid    OMEPRAZOLE (PRILOSEC) 40 MG CAPSULE    Take 1 capsule (40 mg total) by mouth once daily.   Changed and/or Refilled Medications    Modified Medication Previous Medication    LISINOPRIL 10 MG TABLET lisinopriL 10 MG tablet       Take 1 tablet (10 mg total) by mouth 2 (two) times daily.    Take 1 tablet (10 mg total) by mouth 2 (two) times daily.    TRAZODONE (DESYREL) 50 MG TABLET traZODone (DESYREL) 50 MG tablet       Take 1 tablet (50 mg total) by mouth every evening.    Take 1 tablet (50 mg total) by mouth every evening.

## 2023-05-01 DIAGNOSIS — I10 HYPERTENSION, UNSPECIFIED TYPE: ICD-10-CM

## 2023-05-01 PROBLEM — G47.00 INSOMNIA: Status: ACTIVE | Noted: 2023-05-01

## 2023-05-01 RX ORDER — LISINOPRIL 10 MG/1
10 TABLET ORAL 2 TIMES DAILY
Qty: 90 TABLET | Refills: 2 | Status: SHIPPED | OUTPATIENT
Start: 2023-05-01 | End: 2023-07-13 | Stop reason: SDUPTHER

## 2023-05-01 NOTE — TELEPHONE ENCOUNTER
Patient was recently seen by Santiago Garcia, sent to his office to approve refills    ----- Message from Daniela Guerra sent at 5/1/2023 10:37 AM CDT -----  Regarding: refill  Type:  RX Refill Request    Who Called: pt  Refill or New Rx:refill  RX Name and Strength:lisinopriL 10 MG tablet  How is the patient currently taking it? (ex. 1XDay):2 day  Is this a 30 day or 90 day RX:90  Preferred Pharmacy with phone number:[facility] [place]  Local or Mail Order:local  Ordering Provider:emperatriz oglesby  Would the patient rather a call back or a response via [facility]? C/b  Best Call Back Number:[phone]  Additional Information: pt ran out of medication last week

## 2023-05-03 ENCOUNTER — LAB VISIT (OUTPATIENT)
Dept: LAB | Facility: HOSPITAL | Age: 73
End: 2023-05-03
Payer: MEDICARE

## 2023-05-03 ENCOUNTER — TELEPHONE (OUTPATIENT)
Dept: INTERNAL MEDICINE | Facility: CLINIC | Age: 73
End: 2023-05-03
Payer: MEDICARE

## 2023-05-03 DIAGNOSIS — E03.9 HYPOTHYROIDISM, UNSPECIFIED TYPE: ICD-10-CM

## 2023-05-03 DIAGNOSIS — E03.9 HYPOTHYROIDISM, UNSPECIFIED TYPE: Chronic | ICD-10-CM

## 2023-05-03 LAB — TSH SERPL-ACNC: 13.18 UIU/ML (ref 0.35–4.94)

## 2023-05-03 PROCEDURE — 84443 ASSAY THYROID STIM HORMONE: CPT

## 2023-05-03 PROCEDURE — 36415 COLL VENOUS BLD VENIPUNCTURE: CPT

## 2023-05-03 RX ORDER — LEVOTHYROXINE SODIUM 150 UG/1
150 TABLET ORAL
Qty: 30 TABLET | Refills: 11 | Status: SHIPPED | OUTPATIENT
Start: 2023-05-03 | End: 2023-07-05

## 2023-05-03 NOTE — TELEPHONE ENCOUNTER
Stephanie HARDEN Staff  Caller: Unspecified (Today,  1:30 PM)  Type:  Needs Medical Advice     Who Called: pt wife   Symptoms (please be specific):     How long has patient had these symptoms:     Pharmacy name and phone #:     Would the patient rather a call back or a response via [facility]?    Best Call Back Number: [phone]   Additional Information: pt had a Tsh done today that  ordered and and wife said they was to call office in 2 mo to schedule to recheck thyroids but she don't know with who to schedule with he is former pt of  and scheduled with  on 09/19/23

## 2023-05-03 NOTE — TELEPHONE ENCOUNTER
Most recent TSH 13.17 currently on levothyroxine 137 mcg daily, increasing to 150 mcg daily.  Repeat TSH in 8 weeks with add on visit for thyroid..

## 2023-05-03 NOTE — TELEPHONE ENCOUNTER
I spoke with the patients wife.  I got the patient scheduled for an appointment with Santiago for a 2 month f/u for TSH.

## 2023-06-27 ENCOUNTER — TELEPHONE (OUTPATIENT)
Dept: INTERNAL MEDICINE | Facility: CLINIC | Age: 73
End: 2023-06-27
Payer: MEDICARE

## 2023-07-03 ENCOUNTER — LAB VISIT (OUTPATIENT)
Dept: LAB | Facility: HOSPITAL | Age: 73
End: 2023-07-03
Payer: MEDICARE

## 2023-07-03 DIAGNOSIS — E03.9 HYPOTHYROIDISM, UNSPECIFIED TYPE: ICD-10-CM

## 2023-07-03 LAB — TSH SERPL-ACNC: 5.84 UIU/ML (ref 0.35–4.94)

## 2023-07-03 PROCEDURE — 84443 ASSAY THYROID STIM HORMONE: CPT

## 2023-07-03 PROCEDURE — 36415 COLL VENOUS BLD VENIPUNCTURE: CPT

## 2023-07-05 ENCOUNTER — OFFICE VISIT (OUTPATIENT)
Dept: INTERNAL MEDICINE | Facility: CLINIC | Age: 73
End: 2023-07-05
Payer: MEDICARE

## 2023-07-05 VITALS
HEART RATE: 69 BPM | OXYGEN SATURATION: 99 % | SYSTOLIC BLOOD PRESSURE: 132 MMHG | BODY MASS INDEX: 36.18 KG/M2 | DIASTOLIC BLOOD PRESSURE: 76 MMHG | WEIGHT: 245 LBS | RESPIRATION RATE: 18 BRPM | TEMPERATURE: 98 F

## 2023-07-05 DIAGNOSIS — M19.90 ARTHRITIS: ICD-10-CM

## 2023-07-05 DIAGNOSIS — E03.9 HYPOTHYROIDISM, UNSPECIFIED TYPE: Primary | Chronic | ICD-10-CM

## 2023-07-05 DIAGNOSIS — E66.01 CLASS 2 SEVERE OBESITY DUE TO EXCESS CALORIES WITH SERIOUS COMORBIDITY AND BODY MASS INDEX (BMI) OF 36.0 TO 36.9 IN ADULT: Chronic | ICD-10-CM

## 2023-07-05 PROBLEM — E66.812 CLASS 2 SEVERE OBESITY DUE TO EXCESS CALORIES WITH SERIOUS COMORBIDITY AND BODY MASS INDEX (BMI) OF 36.0 TO 36.9 IN ADULT: Chronic | Status: ACTIVE | Noted: 2023-07-05

## 2023-07-05 PROCEDURE — 99214 OFFICE O/P EST MOD 30 MIN: CPT | Mod: ,,,

## 2023-07-05 PROCEDURE — 99214 PR OFFICE/OUTPT VISIT, EST, LEVL IV, 30-39 MIN: ICD-10-PCS | Mod: ,,,

## 2023-07-05 RX ORDER — LEVOTHYROXINE SODIUM 175 UG/1
175 TABLET ORAL
Qty: 90 TABLET | Refills: 0 | Status: SHIPPED | OUTPATIENT
Start: 2023-07-05 | End: 2023-07-13 | Stop reason: SDUPTHER

## 2023-07-05 NOTE — ASSESSMENT & PLAN NOTE
-BMI 36.18   -encourage low-calorie low carb diet   -encourage some continued routine form of aerobic exercise   -hopefully weight loss will help with generalized joint aches

## 2023-07-05 NOTE — PROGRESS NOTES
Patient ID: Darwin Fletcher is a 72 y.o. male.    Chief Complaint: Follow-up (TSH follow up, also concerned with back pain and generalized body pain, also lost of stamina )    72-year-old male here today for a 8 week thyroid follow-up visit as a Dr. Smith patient.  Does have new patient appointment with Dr. Negrete in September,since prior PCP recently retired.  Last visit patient is noted to have TSH 13.7 (May 2023) on levothyroxine 137 mcg daily, subsequently had dose increase to 150 mcg last visit.  Today noted to have repeat TSH of 5.8 (July 2023) and improved from previous.  Does have some complaints of exacerbated  generalized joint aches despite utilizing diclofenac for arthritis.  Describes as multi joint.  Exacerbations of joint pain associated with exercising and yard work.  No fevers, erythema, or edema.  Otherwise patient fairly stable without any acute complaints today.        MEDICAL HISTORY:    Past Medical History:   Diagnosis Date    Arthritis     Bulging of lumbar intervertebral disc     CAD (coronary artery disease)     Class 2 severe obesity due to excess calories with serious comorbidity and body mass index (BMI) of 36.0 to 36.9 in adult 7/5/2023    Gout 06/06/2022    Hypertension 06/06/2022    Hypothyroidism 06/06/2022    Personal history of colonic polyps     Sleep apnea 06/06/2022    uses CPAP uses CPAP    Type 2 diabetes mellitus with diabetic polyneuropathy, without long-term current use of insulin 06/06/2022      Past Surgical History:   Procedure Laterality Date    CHOLECYSTECTOMY      COLONOSCOPY  03/17/2015    COLONOSCOPY Bilateral 07/20/2020    CORONARY ANGIOPLASTY WITH STENT PLACEMENT N/A     Excision of Skin Cancer (Arm)  N/A     EYE SURGERY      INJECTION OF ANESTHETIC AGENT AROUND MEDIAL BRANCH NERVES INNERVATING LUMBAR FACET JOINT N/A 10/26/2022    Procedure: Block-nerve-medial branch-lumbar;  Surgeon: Samreen Horne MD;  Location: [facility];  Service: Pain Management;   Laterality: N/A;  Left L4/L5    KNEE ARTHROSCOPY Left     TRANSFORAMINAL EPIDURAL INJECTION OF STEROID Left 1/5/2023    Procedure: INJECTION, STEROID, EPIDURAL, TRANSFORAMINAL APPROACH Left L5;  Surgeon: Samreen Horne MD;  Location: [facility];  Service: Pain Management;  Laterality: Left;  L5    VASECTOMY N/A       Social History     Tobacco Use    Smoking status: Former     Packs/day: 2.00     Years: 35.00     Pack years: 70.00     Types: Cigarettes    Smokeless tobacco: Never   Substance Use Topics    Alcohol use: Yes     Alcohol/week: 9.0 standard drinks     Types: 2 Glasses of wine, 2 Cans of beer, 3 Shots of liquor, 2 Drinks containing 0.5 oz of alcohol per week     Comment: twice weekly    Drug use: Never          Health Maintenance Due   Topic Date Due    Hepatitis C Screening  Never done    Foot Exam  Never done    TETANUS VACCINE  Never done    Shingles Vaccine (2 of 2) 12/23/2020    COVID-19 Vaccine (6 - Moderna series) 12/08/2022          Patient Care Team:  Kana Smith Jr., MD (Inactive) as PCP - General (Internal Medicine)  Kana Smith Jr., MD (Inactive)  SARAI Perry MD as Consulting Physician (Gastroenterology)      Review of Systems   Constitutional:  Negative for fatigue and fever.   HENT:  Negative for congestion, rhinorrhea, sore throat and trouble swallowing.    Eyes:  Negative for redness and visual disturbance.   Respiratory:  Negative for cough, chest tightness and shortness of breath.    Cardiovascular:  Negative for chest pain and palpitations.   Gastrointestinal:  Negative for abdominal pain, constipation, diarrhea, nausea and vomiting.   Genitourinary:  Negative for dysuria, flank pain, frequency and urgency.   Musculoskeletal:  Positive for arthralgias (Multi joint). Negative for gait problem and myalgias.   Skin:  Negative for rash and wound.   Neurological:  Negative for facial asymmetry, speech difficulty, weakness and headaches.   All other systems reviewed and  are negative.    Objective:   /76 (BP Location: Right arm, Patient Position: Sitting, BP Method: Large (Automatic))   Pulse 69   Temp 97.7 °F (36.5 °C) (Temporal)   Resp 18   Wt 111.1 kg (245 lb)   SpO2 99%   BMI 36.18 kg/m²      Physical Exam  Constitutional:       General: He is not in acute distress.     Appearance: Normal appearance. He is obese.   HENT:      Right Ear: Tympanic membrane, ear canal and external ear normal.      Left Ear: Tympanic membrane, ear canal and external ear normal.      Nose: Nose normal.      Mouth/Throat:      Mouth: Mucous membranes are moist.      Pharynx: Oropharynx is clear.   Eyes:      Extraocular Movements: Extraocular movements intact.      Conjunctiva/sclera: Conjunctivae normal.      Pupils: Pupils are equal, round, and reactive to light.   Cardiovascular:      Rate and Rhythm: Normal rate and regular rhythm.      Pulses: Normal pulses.      Heart sounds: Normal heart sounds. No murmur heard.    No gallop.   Pulmonary:      Effort: Pulmonary effort is normal.      Breath sounds: Normal breath sounds. No wheezing.   Abdominal:      General: Bowel sounds are normal. There is no distension.      Palpations: Abdomen is soft. There is no mass.      Tenderness: There is no abdominal tenderness. There is no guarding.   Musculoskeletal:         General: Normal range of motion.   Skin:     General: Skin is warm and dry.   Neurological:      Mental Status: He is alert. Mental status is at baseline.      Sensory: No sensory deficit.      Motor: No weakness.         Assessment:       ICD-10-CM ICD-9-CM   1. Hypothyroidism, unspecified type  E03.9 244.9   2. Arthritis  M19.90 716.90   3. Class 2 severe obesity due to excess calories with serious comorbidity and body mass index (BMI) of 36.0 to 36.9 in adult  E66.01 278.01    Z68.36 V85.36        Plan:     Problem List Items Addressed This Visit          Endocrine    Hypothyroidism - Primary (Chronic)     Lab Results    Component Value Date    TSH 5.841 (H) 07/03/2023   -improved from previous 13.17  -currently on levothyroxine 150 mcg daily, increase to 175 mcg daily   -order  follow-up TSH in 8 weeks           Relevant Medications    levothyroxine (SYNTHROID, LEVOTHROID) 175 MCG tablet    Other Relevant Orders    TSH    Class 2 severe obesity due to excess calories with serious comorbidity and body mass index (BMI) of 36.0 to 36.9 in adult (Chronic)     -BMI 36.18   -encourage low-calorie low carb diet   -encourage some continued routine form of aerobic exercise   -hopefully weight loss will help with generalized joint aches            Orthopedic    Arthritis (Chronic)     -acute on chronic  -currently on diclofenac 75 mcg b.i.d., however utilizes only once daily .  May utilize prescribed 2nd dose at night for flare-up  -encouraged to utilize Tylenol Arthritis nightly   -okay to incorporate heat application   -may potentially benefit from weight loss               Follow up for Previously scheduled and PRN if need, with Dr. Negrete.   -plan specifics discussed above    Orders Placed This Encounter    TSH    levothyroxine (SYNTHROID, LEVOTHROID) 175 MCG tablet        Medication List with Changes/Refills   New Medications    LEVOTHYROXINE (SYNTHROID, LEVOTHROID) 175 MCG TABLET    Take 1 tablet (175 mcg total) by mouth before breakfast.   Current Medications    ALLOPURINOL (ZYLOPRIM) 300 MG TABLET    Take 1 tablet (300 mg total) by mouth once daily.    ASPIRIN 81 MG CAP    Take 1 tablet by mouth once daily.    DICLOFENAC (VOLTAREN) 75 MG EC TABLET    Take 1 tablet (75 mg total) by mouth 2 (two) times daily.    FENOFIBRATE 160 MG TAB    Take 1 tablet (160 mg total) by mouth once daily.    GLIMEPIRIDE (AMARYL) 2 MG TABLET    Take 0.5 tablets (1 mg total) by mouth 2 (two) times a day.    GLUCOSAMINE-CHONDROITIN 250-200 MG TAB    Take 1 tablet by mouth Daily.    IRON-VIT C-B12-FOLIC ACID (IRON 100 PLUS) TAB    1 tablet Daily.     LISINOPRIL 10 MG TABLET    Take 1 tablet (10 mg total) by mouth 2 (two) times daily.    METFORMIN (GLUCOPHAGE) 500 MG TABLET    Take 1/2 tablet bid    OMEPRAZOLE (PRILOSEC) 40 MG CAPSULE    Take 1 capsule (40 mg total) by mouth once daily.    TRAZODONE (DESYREL) 50 MG TABLET    Take 1 tablet (50 mg total) by mouth every evening.   Discontinued Medications    GABAPENTIN (NEURONTIN) 300 MG CAPSULE    Take 1 capsule (300 mg total) by mouth 2 (two) times daily.    LEVOTHYROXINE (SYNTHROID) 150 MCG TABLET    Take 1 tablet (150 mcg total) by mouth before breakfast.

## 2023-07-05 NOTE — ASSESSMENT & PLAN NOTE
Lab Results   Component Value Date    TSH 5.841 (H) 07/03/2023   -improved from previous 13.17  -currently on levothyroxine 150 mcg daily, increase to 175 mcg daily   -order  follow-up TSH in 8 weeks

## 2023-07-05 NOTE — ASSESSMENT & PLAN NOTE
-acute on chronic  -currently on diclofenac 75 mcg b.i.d., however utilizes only once daily .  May utilize prescribed 2nd dose at night for flare-up  -encouraged to utilize Tylenol Arthritis nightly   -okay to incorporate heat application   -may potentially benefit from weight loss

## 2023-07-13 DIAGNOSIS — I10 HYPERTENSION, UNSPECIFIED TYPE: ICD-10-CM

## 2023-07-13 DIAGNOSIS — E03.9 HYPOTHYROIDISM, UNSPECIFIED TYPE: Chronic | ICD-10-CM

## 2023-07-13 DIAGNOSIS — G47.00 INSOMNIA, UNSPECIFIED TYPE: ICD-10-CM

## 2023-07-13 RX ORDER — LEVOTHYROXINE SODIUM 175 UG/1
175 TABLET ORAL
Qty: 90 TABLET | Refills: 2 | Status: SHIPPED | OUTPATIENT
Start: 2023-07-13 | End: 2024-02-26

## 2023-07-13 RX ORDER — TRAZODONE HYDROCHLORIDE 50 MG/1
50 TABLET ORAL NIGHTLY
Qty: 90 TABLET | Refills: 2 | Status: SHIPPED | OUTPATIENT
Start: 2023-07-13 | End: 2023-12-27

## 2023-07-13 RX ORDER — LISINOPRIL 10 MG/1
10 TABLET ORAL 2 TIMES DAILY
Qty: 90 TABLET | Refills: 2 | Status: SHIPPED | OUTPATIENT
Start: 2023-07-13 | End: 2023-08-11 | Stop reason: SDUPTHER

## 2023-08-01 ENCOUNTER — LAB VISIT (OUTPATIENT)
Dept: LAB | Facility: HOSPITAL | Age: 73
End: 2023-08-01
Payer: MEDICARE

## 2023-08-01 DIAGNOSIS — E03.9 HYPOTHYROIDISM, UNSPECIFIED TYPE: Chronic | ICD-10-CM

## 2023-08-01 LAB — TSH SERPL-ACNC: 2.5 UIU/ML (ref 0.35–4.94)

## 2023-08-01 PROCEDURE — 36415 COLL VENOUS BLD VENIPUNCTURE: CPT

## 2023-08-01 PROCEDURE — 84443 ASSAY THYROID STIM HORMONE: CPT

## 2023-08-11 DIAGNOSIS — I10 HYPERTENSION, UNSPECIFIED TYPE: ICD-10-CM

## 2023-08-11 RX ORDER — LISINOPRIL 10 MG/1
10 TABLET ORAL 2 TIMES DAILY
Qty: 90 TABLET | Refills: 2 | Status: SHIPPED | OUTPATIENT
Start: 2023-08-11 | End: 2024-01-03

## 2023-08-14 ENCOUNTER — TELEPHONE (OUTPATIENT)
Dept: INTERNAL MEDICINE | Facility: CLINIC | Age: 73
End: 2023-08-14
Payer: MEDICARE

## 2023-08-14 NOTE — TELEPHONE ENCOUNTER
----- Message from Janene Hull sent at 8/14/2023  9:42 AM CDT -----  .Type:  Needs Medical Advice    Who Called: pt wife Miguelina   Would the patient rather a call back or a response via [facility]? Call back   Best Call Back Number: [phone]  Additional Information: pt wife wants to know if the amLODIPine (NORVASC) 10 MG tablet was a discontinued medication and needs to be refilled.

## 2023-08-14 NOTE — TELEPHONE ENCOUNTER
Spoke with Miguelina alonzo wife informed her that the pt is taking lisinopril instead of amlodipine. Wife voiced understanding

## 2023-08-15 ENCOUNTER — TELEPHONE (OUTPATIENT)
Dept: INTERNAL MEDICINE | Facility: CLINIC | Age: 73
End: 2023-08-15
Payer: MEDICARE

## 2023-08-15 NOTE — TELEPHONE ENCOUNTER
----- Message from Maegan Skaggs sent at 8/11/2023  2:36 PM CDT -----  Regarding: Cll back  .Type:  Patient Returning Call    Who Called:Miguelina (wife)  Who Left Message for Patient:Miguelina  Does the patient know what this is regarding?:Amlodipine  Would the patient rather a call back or a response via [facility]?   Best Call Back Number:[phone]  Additional Information: Call patient back about clarification on medication : amlodipine

## 2023-08-15 NOTE — TELEPHONE ENCOUNTER
1st ATC.. LVM for patient wife to call [facility] office back to see if she spoke with someone about patient medication

## 2023-08-16 NOTE — TELEPHONE ENCOUNTER
Pt's wife called stating that pt has been taking Amlodipine 10 mg PO QD, but it is not on pt medication list. They would like to know if he needs to continue the medication or he DC. The pt did note that he has been taking the medication and just ran out today.   Wife callback: [phone]   details…

## 2023-08-16 NOTE — TELEPHONE ENCOUNTER
Spoke to wife and she stated that amlodipine 10 mg was last filled on 9/27/22 with 0 refills at [facility] [place].   Spoke to Jacquie from [place] [address] and the Amlodipine was filled on 9/27/22 take 1 tab PO QD #90 with 0 refills.  I called Dr. Jc Vega office to see if they had changed the medication. I left a  with callback number.

## 2023-08-16 NOTE — TELEPHONE ENCOUNTER
"Spoke to Stephy from Dr. Vega office and she stated that they had pt's last RX sent in the year of 2021. They did not DC medication, but do agree with, "If he has not taken the amlodipine medication for over 6 months and blood pressure has been controlled at home, okay to hold off re-initiation and maintain on lisinopril alone."  Spoke to pt's wife. Pt's wife verbally confirmed understanding.     "

## 2023-08-16 NOTE — TELEPHONE ENCOUNTER
Please contact patient's pharmacy to clarify recently filled medication.  According to our record.  Patient last filled amlodipine 10 mg 09/27/2022 by Dr. Smith for 90 days and none afterwards.  However he is currently noted to be on lisinopril 10 mg which was filled last month.  Wife may have medications confused?  However please clarify with patient's pharmacy to see if he recently filled amlodipine.

## 2023-08-16 NOTE — TELEPHONE ENCOUNTER
Thank you.  If he has not taken the amlodipine medication for over 6 months and blood pressure has been controlled at home, okay to hold off re-initiation and maintain on lisinopril alone.

## 2023-09-07 ENCOUNTER — TELEPHONE (OUTPATIENT)
Dept: INTERNAL MEDICINE | Facility: CLINIC | Age: 73
End: 2023-09-07
Payer: MEDICARE

## 2023-09-07 NOTE — TELEPHONE ENCOUNTER
----- Message from Shelly Alcaraz sent at 9/7/2023  1:38 PM CDT -----  Regarding: lab  Caller is requesting to schedule their Lab appointment prior to annual appointment.  Order is not listed in EPIC.  Please enter order and contact patient to schedule.    Name of Caller:pt    Preferred Date and Time of Labs:    Date of [facility] Appointment:9/19    Where would they like the lab performed?[facility]    Would the patient rather a call back or a response via [facility]? lorri    Best Call Back Number:[phone]    Additional Information:pt called about an upcoming wellness appt and is needing labs ordered

## 2023-09-13 ENCOUNTER — TELEPHONE (OUTPATIENT)
Dept: INTERNAL MEDICINE | Facility: CLINIC | Age: 73
End: 2023-09-13
Payer: MEDICARE

## 2023-09-19 ENCOUNTER — OFFICE VISIT (OUTPATIENT)
Dept: INTERNAL MEDICINE | Facility: CLINIC | Age: 73
End: 2023-09-19
Payer: MEDICARE

## 2023-09-19 VITALS
OXYGEN SATURATION: 95 % | DIASTOLIC BLOOD PRESSURE: 70 MMHG | WEIGHT: 244 LBS | SYSTOLIC BLOOD PRESSURE: 142 MMHG | HEART RATE: 60 BPM | BODY MASS INDEX: 36.14 KG/M2 | HEIGHT: 69 IN

## 2023-09-19 DIAGNOSIS — Z23 NEED FOR INFLUENZA VACCINATION: ICD-10-CM

## 2023-09-19 DIAGNOSIS — E03.9 HYPOTHYROIDISM, UNSPECIFIED TYPE: Chronic | ICD-10-CM

## 2023-09-19 DIAGNOSIS — M10.9 GOUT, UNSPECIFIED CAUSE, UNSPECIFIED CHRONICITY, UNSPECIFIED SITE: Chronic | ICD-10-CM

## 2023-09-19 DIAGNOSIS — K21.9 GASTROESOPHAGEAL REFLUX DISEASE, UNSPECIFIED WHETHER ESOPHAGITIS PRESENT: ICD-10-CM

## 2023-09-19 DIAGNOSIS — E11.42 TYPE 2 DIABETES MELLITUS WITH DIABETIC POLYNEUROPATHY, WITHOUT LONG-TERM CURRENT USE OF INSULIN: Chronic | ICD-10-CM

## 2023-09-19 DIAGNOSIS — I10 HYPERTENSION, UNSPECIFIED TYPE: Chronic | ICD-10-CM

## 2023-09-19 DIAGNOSIS — E78.5 HYPERLIPIDEMIA, UNSPECIFIED HYPERLIPIDEMIA TYPE: ICD-10-CM

## 2023-09-19 PROCEDURE — 90694 VACC AIIV4 NO PRSRV 0.5ML IM: CPT | Mod: ,,, | Performed by: STUDENT IN AN ORGANIZED HEALTH CARE EDUCATION/TRAINING PROGRAM

## 2023-09-19 PROCEDURE — 99214 OFFICE O/P EST MOD 30 MIN: CPT | Mod: ,,, | Performed by: STUDENT IN AN ORGANIZED HEALTH CARE EDUCATION/TRAINING PROGRAM

## 2023-09-19 PROCEDURE — G0008 FLU VACCINE - QUADRIVALENT - ADJUVANTED: ICD-10-PCS | Mod: ,,, | Performed by: STUDENT IN AN ORGANIZED HEALTH CARE EDUCATION/TRAINING PROGRAM

## 2023-09-19 PROCEDURE — 99214 PR OFFICE/OUTPT VISIT, EST, LEVL IV, 30-39 MIN: ICD-10-PCS | Mod: ,,, | Performed by: STUDENT IN AN ORGANIZED HEALTH CARE EDUCATION/TRAINING PROGRAM

## 2023-09-19 PROCEDURE — 90694 FLU VACCINE - QUADRIVALENT - ADJUVANTED: ICD-10-PCS | Mod: ,,, | Performed by: STUDENT IN AN ORGANIZED HEALTH CARE EDUCATION/TRAINING PROGRAM

## 2023-09-19 PROCEDURE — G0008 ADMIN INFLUENZA VIRUS VAC: HCPCS | Mod: ,,, | Performed by: STUDENT IN AN ORGANIZED HEALTH CARE EDUCATION/TRAINING PROGRAM

## 2023-09-19 RX ORDER — FENOFIBRATE 160 MG/1
160 TABLET ORAL
Qty: 90 TABLET | Refills: 3 | Status: SHIPPED | OUTPATIENT
Start: 2023-09-19

## 2023-09-19 RX ORDER — AMLODIPINE BESYLATE 5 MG/1
5 TABLET ORAL DAILY
Qty: 30 TABLET | Refills: 3 | Status: SHIPPED | OUTPATIENT
Start: 2023-09-19 | End: 2023-10-03

## 2023-09-19 RX ORDER — OMEPRAZOLE 40 MG/1
40 CAPSULE, DELAYED RELEASE ORAL
Qty: 90 CAPSULE | Refills: 3 | Status: ON HOLD | OUTPATIENT
Start: 2023-09-19 | End: 2024-02-22 | Stop reason: HOSPADM

## 2023-09-19 RX ORDER — METFORMIN HYDROCHLORIDE 500 MG/1
TABLET ORAL
Qty: 90 TABLET | Refills: 2 | Status: SHIPPED | OUTPATIENT
Start: 2023-09-19

## 2023-09-21 NOTE — ASSESSMENT & PLAN NOTE
Uncontrolled  Will resume taking amlodipine, sent to pharmacy today  Check blood pressure at home and keep log  Follow up in 2 weeks

## 2023-09-21 NOTE — PROGRESS NOTES
Subjective:      Darwin Fletcher  09/21/2023  [id number]      Chief Complaint: Establish Care (NP)       HPI:  Mr Granados is a 71 y/o male patient who is here to establish care. Patient has type 2 DM, hypothyroidism, hypertension, CAD, gout. Patient does not have any complaints today. Blood pressure is elevated today, patient states he has not been taking amlodipine, believes he was taken off of medication.     Past Medical History:   Diagnosis Date    Arthritis     Bulging of lumbar intervertebral disc     CAD (coronary artery disease)     Class 2 severe obesity due to excess calories with serious comorbidity and body mass index (BMI) of 36.0 to 36.9 in adult 7/5/2023    Gout 06/06/2022    Hypertension 06/06/2022    Hypothyroidism 06/06/2022    Personal history of colonic polyps     Sleep apnea 06/06/2022    uses CPAP uses CPAP    Type 2 diabetes mellitus with diabetic polyneuropathy, without long-term current use of insulin 06/06/2022     Past Surgical History:   Procedure Laterality Date    CHOLECYSTECTOMY      COLONOSCOPY  03/17/2015    COLONOSCOPY Bilateral 07/20/2020    CORONARY ANGIOPLASTY WITH STENT PLACEMENT N/A     Excision of Skin Cancer (Arm)  N/A     EYE SURGERY      INJECTION OF ANESTHETIC AGENT AROUND MEDIAL BRANCH NERVES INNERVATING LUMBAR FACET JOINT N/A 10/26/2022    Procedure: Block-nerve-medial branch-lumbar;  Surgeon: Samreen Horne MD;  Location: [facility] OR;  Service: Pain Management;  Laterality: N/A;  Left L4/L5    KNEE ARTHROSCOPY Left     TRANSFORAMINAL EPIDURAL INJECTION OF STEROID Left 1/5/2023    Procedure: INJECTION, STEROID, EPIDURAL, TRANSFORAMINAL APPROACH Left L5;  Surgeon: Samreen Horne MD;  Location: [facility] OR;  Service: Pain Management;  Laterality: Left;  L5    VASECTOMY N/A      Family History   Problem Relation Age of Onset    Heart attack Mother     Lung cancer Father     Cancer Father      Social History     Tobacco Use    Smoking status: Former     Current packs/day: 2.00      Average packs/day: 2.0 packs/day for 35.0 years (70.0 ttl pk-yrs)     Types: Cigarettes    Smokeless tobacco: Never   Substance and Sexual Activity    Alcohol use: Yes     Alcohol/week: 9.0 standard drinks of alcohol     Types: 2 Glasses of wine, 2 Cans of beer, 3 Shots of liquor, 2 Drinks containing 0.5 oz of alcohol per week     Comment: twice weekly    Drug use: Never    Sexual activity: Yes     Partners: Female     Review of patient's allergies indicates:  No Known Allergies    The following were reviewed at this visit: active problem list, medication list, allergies, family history, social history, and health maintenance.    Medications:    Current Outpatient Medications:     allopurinoL (ZYLOPRIM) 300 MG tablet, Take 1 tablet (300 mg total) by mouth once daily., Disp: 90 tablet, Rfl: 3    aspirin 81 mg Cap, Take 1 tablet by mouth once daily., Disp: , Rfl:     diclofenac (VOLTAREN) 75 MG EC tablet, Take 1 tablet (75 mg total) by mouth 2 (two) times daily., Disp: 180 tablet, Rfl: 2    glimepiride (AMARYL) 2 MG tablet, Take 0.5 tablets (1 mg total) by mouth 2 (two) times a day., Disp: 30 tablet, Rfl: 11    glucosamine-chondroitin 250-200 mg Tab, Take 1 tablet by mouth Daily., Disp: , Rfl:     iron-vit c-b12-folic acid (IRON 100 PLUS) Tab, 1 tablet Daily., Disp: , Rfl:     levothyroxine (SYNTHROID, LEVOTHROID) 175 MCG tablet, Take 1 tablet (175 mcg total) by mouth before breakfast., Disp: 90 tablet, Rfl: 2    lisinopriL 10 MG tablet, Take 1 tablet (10 mg total) by mouth 2 (two) times daily., Disp: 90 tablet, Rfl: 2    traZODone (DESYREL) 50 MG tablet, Take 1 tablet (50 mg total) by mouth every evening., Disp: 90 tablet, Rfl: 2    amLODIPine (NORVASC) 5 MG tablet, Take 1 tablet (5 mg total) by mouth once daily., Disp: 30 tablet, Rfl: 3    fenofibrate 160 MG Tab, Take 1 tablet by mouth once daily., Disp: 90 tablet, Rfl: 3    metFORMIN (GLUCOPHAGE) 500 MG tablet, Take 1/2 tablet bid, Disp: 90 tablet, Rfl: 2     "omeprazole (PRILOSEC) 40 MG capsule, Take 1 capsule by mouth daily., Disp: 90 capsule, Rfl: 3      Medications have been reviewed and reconciled with patient at this visit.  Barriers to medications reviewed with patient.    Adverse reactions to current medications reviewed with patient..    Over the counter medications reviewed and reconciled with patient.  Review of Systems   Constitutional:  Negative for chills, diaphoresis, fever and weight loss.   HENT:  Negative for congestion, ear discharge, sinus pain and sore throat.    Eyes:  Negative for photophobia.   Respiratory:  Negative for cough, hemoptysis and shortness of breath.    Cardiovascular:  Negative for chest pain, palpitations, claudication, leg swelling and PND.   Gastrointestinal:  Negative for constipation, diarrhea, nausea and vomiting.   Genitourinary:  Negative for dysuria, frequency and urgency.   Musculoskeletal:  Negative for back pain, joint pain, myalgias and neck pain.   Skin:  Negative for itching and rash.   Neurological:  Negative for dizziness, focal weakness and headaches.   Psychiatric/Behavioral:  Negative for depression. The patient does not have insomnia.            Objective:      Vitals:    09/19/23 0922   BP: (!) 142/70   Pulse: 60   SpO2: 95%   Weight: 110.7 kg (244 lb)   Height: 5' 9" (1.753 m)       Physical Exam  Constitutional:       Appearance: Normal appearance.   HENT:      Head: Normocephalic and atraumatic.   Cardiovascular:      Rate and Rhythm: Normal rate and regular rhythm.      Pulses: Normal pulses.   Pulmonary:      Effort: Pulmonary effort is normal.      Breath sounds: Normal breath sounds.   Abdominal:      General: Abdomen is flat. Bowel sounds are normal. There is no distension.      Palpations: Abdomen is soft.      Tenderness: There is no abdominal tenderness.   Musculoskeletal:         General: No tenderness. Normal range of motion.      Right lower leg: No edema.      Left lower leg: No edema. "   Lymphadenopathy:      Cervical: No cervical adenopathy.   Neurological:      General: No focal deficit present.      Mental Status: He is alert and oriented to person, place, and time.               Assessment and Plan:       1. Type 2 diabetes mellitus with diabetic polyneuropathy, without long-term current use of insulin  Assessment & Plan:  A1C of 5.0 in March 2023  Continue current medications  Will check A1C before next visit     Orders:  -     metFORMIN (GLUCOPHAGE) 500 MG tablet; Take 1/2 tablet bid  Dispense: 90 tablet; Refill: 2    2. Hypertension, unspecified type  Assessment & Plan:  Uncontrolled  Will resume taking amlodipine, sent to pharmacy today  Check blood pressure at home and keep log  Follow up in 2 weeks       3. Hypothyroidism, unspecified type  Assessment & Plan:  Continue current medication       4. Gout, unspecified cause, unspecified chronicity, unspecified site  Assessment & Plan:  Continue allopurinol       5. Need for influenza vaccination  -     Influenza - Quadrivalent (Adjuvanted)    Other orders  -     amLODIPine (NORVASC) 5 MG tablet; Take 1 tablet (5 mg total) by mouth once daily.  Dispense: 30 tablet; Refill: 3            Follow up: Follow up in about 2 weeks (around 10/3/2023) for wellness, Labs check.

## 2023-09-25 DIAGNOSIS — Z00.00 WELLNESS EXAMINATION: ICD-10-CM

## 2023-09-25 DIAGNOSIS — E11.42 TYPE 2 DIABETES MELLITUS WITH DIABETIC POLYNEUROPATHY, WITHOUT LONG-TERM CURRENT USE OF INSULIN: ICD-10-CM

## 2023-09-25 DIAGNOSIS — Z12.5 SCREENING FOR PROSTATE CANCER: ICD-10-CM

## 2023-09-25 DIAGNOSIS — I10 HYPERTENSION, UNSPECIFIED TYPE: Chronic | ICD-10-CM

## 2023-09-25 DIAGNOSIS — E03.9 HYPOTHYROIDISM, UNSPECIFIED TYPE: Primary | ICD-10-CM

## 2023-09-25 DIAGNOSIS — E11.42 TYPE 2 DIABETES MELLITUS WITH DIABETIC POLYNEUROPATHY, WITHOUT LONG-TERM CURRENT USE OF INSULIN: Primary | ICD-10-CM

## 2023-09-25 RX ORDER — GLIMEPIRIDE 1 MG/1
1 TABLET ORAL 2 TIMES DAILY
Qty: 60 TABLET | Refills: 1 | Status: SHIPPED | OUTPATIENT
Start: 2023-09-25 | End: 2023-11-14 | Stop reason: SDUPTHER

## 2023-09-26 ENCOUNTER — TELEPHONE (OUTPATIENT)
Dept: INTERNAL MEDICINE | Facility: CLINIC | Age: 73
End: 2023-09-26
Payer: MEDICARE

## 2023-09-28 ENCOUNTER — LAB VISIT (OUTPATIENT)
Dept: LAB | Facility: HOSPITAL | Age: 73
End: 2023-09-28
Attending: STUDENT IN AN ORGANIZED HEALTH CARE EDUCATION/TRAINING PROGRAM
Payer: MEDICARE

## 2023-09-28 DIAGNOSIS — E03.9 HYPOTHYROIDISM, UNSPECIFIED TYPE: ICD-10-CM

## 2023-09-28 DIAGNOSIS — I10 HYPERTENSION, UNSPECIFIED TYPE: ICD-10-CM

## 2023-09-28 DIAGNOSIS — E11.42 TYPE 2 DIABETES MELLITUS WITH DIABETIC POLYNEUROPATHY, WITHOUT LONG-TERM CURRENT USE OF INSULIN: ICD-10-CM

## 2023-09-28 DIAGNOSIS — Z12.5 SCREENING FOR PROSTATE CANCER: ICD-10-CM

## 2023-09-28 DIAGNOSIS — Z00.00 WELLNESS EXAMINATION: ICD-10-CM

## 2023-09-28 LAB
ALBUMIN SERPL-MCNC: 4.2 G/DL (ref 3.4–4.8)
ALBUMIN/GLOB SERPL: 1.6 RATIO (ref 1.1–2)
ALP SERPL-CCNC: 27 UNIT/L (ref 40–150)
ALT SERPL-CCNC: 21 UNIT/L (ref 0–55)
AST SERPL-CCNC: 23 UNIT/L (ref 5–34)
BASOPHILS # BLD AUTO: 0.05 X10(3)/MCL
BASOPHILS NFR BLD AUTO: 0.8 %
BILIRUB SERPL-MCNC: 0.6 MG/DL
BUN SERPL-MCNC: 21.5 MG/DL (ref 8.4–25.7)
CALCIUM SERPL-MCNC: 9.6 MG/DL (ref 8.8–10)
CHLORIDE SERPL-SCNC: 107 MMOL/L (ref 98–107)
CHOLEST SERPL-MCNC: 156 MG/DL
CHOLEST/HDLC SERPL: 4 {RATIO} (ref 0–5)
CO2 SERPL-SCNC: 27 MMOL/L (ref 23–31)
CREAT SERPL-MCNC: 1.02 MG/DL (ref 0.73–1.18)
CREAT UR-MCNC: 120.7 MG/DL (ref 63–166)
EOSINOPHIL # BLD AUTO: 0.39 X10(3)/MCL (ref 0–0.9)
EOSINOPHIL NFR BLD AUTO: 5.9 %
ERYTHROCYTE [DISTWIDTH] IN BLOOD BY AUTOMATED COUNT: 13.2 % (ref 11.5–17)
EST. AVERAGE GLUCOSE BLD GHB EST-MCNC: 99.7 MG/DL
GFR SERPLBLD CREATININE-BSD FMLA CKD-EPI: >60 MLS/MIN/1.73/M2
GLOBULIN SER-MCNC: 2.6 GM/DL (ref 2.4–3.5)
GLUCOSE SERPL-MCNC: 113 MG/DL (ref 82–115)
HBA1C MFR BLD: 5.1 %
HCT VFR BLD AUTO: 41.4 % (ref 42–52)
HDLC SERPL-MCNC: 36 MG/DL (ref 35–60)
HGB BLD-MCNC: 13.1 G/DL (ref 14–18)
IMM GRANULOCYTES # BLD AUTO: 0.05 X10(3)/MCL (ref 0–0.04)
IMM GRANULOCYTES NFR BLD AUTO: 0.8 %
LDLC SERPL CALC-MCNC: 103 MG/DL (ref 50–140)
LYMPHOCYTES # BLD AUTO: 1.34 X10(3)/MCL (ref 0.6–4.6)
LYMPHOCYTES NFR BLD AUTO: 20.4 %
MCH RBC QN AUTO: 30.2 PG (ref 27–31)
MCHC RBC AUTO-ENTMCNC: 31.6 G/DL (ref 33–36)
MCV RBC AUTO: 95.4 FL (ref 80–94)
MICROALBUMIN UR-MCNC: 11.3 UG/ML
MICROALBUMIN/CREAT RATIO PNL UR: 9.4 MG/GM CR (ref 0–30)
MONOCYTES # BLD AUTO: 0.48 X10(3)/MCL (ref 0.1–1.3)
MONOCYTES NFR BLD AUTO: 7.3 %
NEUTROPHILS # BLD AUTO: 4.25 X10(3)/MCL (ref 2.1–9.2)
NEUTROPHILS NFR BLD AUTO: 64.8 %
NRBC BLD AUTO-RTO: 0 %
PLATELET # BLD AUTO: 181 X10(3)/MCL (ref 130–400)
PMV BLD AUTO: 9.8 FL (ref 7.4–10.4)
POTASSIUM SERPL-SCNC: 4.4 MMOL/L (ref 3.5–5.1)
PROT SERPL-MCNC: 6.8 GM/DL (ref 5.8–7.6)
PSA SERPL-MCNC: 0.99 NG/ML
RBC # BLD AUTO: 4.34 X10(6)/MCL (ref 4.7–6.1)
SODIUM SERPL-SCNC: 141 MMOL/L (ref 136–145)
TRIGL SERPL-MCNC: 86 MG/DL (ref 34–140)
TSH SERPL-ACNC: 3 UIU/ML (ref 0.35–4.94)
VLDLC SERPL CALC-MCNC: 17 MG/DL
WBC # SPEC AUTO: 6.56 X10(3)/MCL (ref 4.5–11.5)

## 2023-09-28 PROCEDURE — 82043 UR ALBUMIN QUANTITATIVE: CPT

## 2023-09-28 PROCEDURE — 80061 LIPID PANEL: CPT

## 2023-09-28 PROCEDURE — 83036 HEMOGLOBIN GLYCOSYLATED A1C: CPT

## 2023-09-28 PROCEDURE — 80053 COMPREHEN METABOLIC PANEL: CPT

## 2023-09-28 PROCEDURE — 85025 COMPLETE CBC W/AUTO DIFF WBC: CPT

## 2023-09-28 PROCEDURE — 84443 ASSAY THYROID STIM HORMONE: CPT

## 2023-09-28 PROCEDURE — 36415 COLL VENOUS BLD VENIPUNCTURE: CPT

## 2023-09-28 PROCEDURE — 84153 ASSAY OF PSA TOTAL: CPT

## 2023-10-02 LAB
LEFT EYE DM RETINOPATHY: NEGATIVE
RIGHT EYE DM RETINOPATHY: NEGATIVE

## 2023-10-03 ENCOUNTER — OFFICE VISIT (OUTPATIENT)
Dept: INTERNAL MEDICINE | Facility: CLINIC | Age: 73
End: 2023-10-03
Payer: MEDICARE

## 2023-10-03 VITALS
HEART RATE: 71 BPM | RESPIRATION RATE: 18 BRPM | OXYGEN SATURATION: 96 % | TEMPERATURE: 98 F | WEIGHT: 247.38 LBS | SYSTOLIC BLOOD PRESSURE: 138 MMHG | BODY MASS INDEX: 36.53 KG/M2 | DIASTOLIC BLOOD PRESSURE: 72 MMHG

## 2023-10-03 DIAGNOSIS — I10 HYPERTENSION, UNSPECIFIED TYPE: Chronic | ICD-10-CM

## 2023-10-03 DIAGNOSIS — E78.5 HYPERLIPIDEMIA, UNSPECIFIED HYPERLIPIDEMIA TYPE: ICD-10-CM

## 2023-10-03 DIAGNOSIS — E11.42 TYPE 2 DIABETES MELLITUS WITH DIABETIC POLYNEUROPATHY, WITHOUT LONG-TERM CURRENT USE OF INSULIN: Chronic | ICD-10-CM

## 2023-10-03 DIAGNOSIS — K21.9 GASTROESOPHAGEAL REFLUX DISEASE WITHOUT ESOPHAGITIS: Chronic | ICD-10-CM

## 2023-10-03 DIAGNOSIS — E03.9 HYPOTHYROIDISM, UNSPECIFIED TYPE: Chronic | ICD-10-CM

## 2023-10-03 DIAGNOSIS — M10.9 GOUT, UNSPECIFIED CAUSE, UNSPECIFIED CHRONICITY, UNSPECIFIED SITE: Chronic | ICD-10-CM

## 2023-10-03 DIAGNOSIS — D50.8 IRON DEFICIENCY ANEMIA SECONDARY TO INADEQUATE DIETARY IRON INTAKE: Primary | ICD-10-CM

## 2023-10-03 PROCEDURE — G0439 PR MEDICARE ANNUAL WELLNESS SUBSEQUENT VISIT: ICD-10-PCS | Mod: ,,,

## 2023-10-03 PROCEDURE — G0439 PPPS, SUBSEQ VISIT: HCPCS | Mod: ,,,

## 2023-10-03 RX ORDER — AMLODIPINE BESYLATE 10 MG/1
10 TABLET ORAL DAILY
Qty: 90 TABLET | Refills: 0 | Status: SHIPPED | OUTPATIENT
Start: 2023-10-03 | End: 2023-12-11

## 2023-10-03 NOTE — PROGRESS NOTES
Patient Care Team:  Vanessa Stout MD as PCP - General (Internal Medicine)      Patient ID: Darwin Fletcher is a 72 y.o. male.    Chief Complaint: Medicare AWV (Pt doing well no questions or concerns )      HPI:     72-year-old male here today for  wellness visit as a Dr. Negrete patient.  Medical comorbidities include CAD, HTN, HLD, dm 2, hypothyroidism, sleep apnea, and gout.  Since last visit patient reports he has been fairly well without acute injury or major illness.  Most recent labs reviewed in generally stable.  TSH 3.0 currently well-controlled on levothyroxine 150 mcg daily.  HGB A1c 5.1 well-controlled on current regimen.  /72, however reports elevations in the 140s to 150s systolic range at home.  Reports 100% compliance currently on losartan 10 mg b.i.d. and amlodipine 5 mg daily.  Denies headaches, palpitations, chest pain, or weakness.  Reports recently completed diabetic eye exam with Dr. Francisco.  Otherwise patient fairly stable without acute complaints today.    Medicare wellness: Today 10/03/2023        MEDICAL HISTORY:    Past Medical History:   Diagnosis Date    Arthritis     Bulging of lumbar intervertebral disc     CAD (coronary artery disease)     Class 2 severe obesity due to excess calories with serious comorbidity and body mass index (BMI) of 36.0 to 36.9 in adult 7/5/2023    Gout 06/06/2022    Hypertension 06/06/2022    Hypothyroidism 06/06/2022    Personal history of colonic polyps     Sleep apnea 06/06/2022    uses CPAP uses CPAP    Type 2 diabetes mellitus with diabetic polyneuropathy, without long-term current use of insulin 06/06/2022      Past Surgical History:   Procedure Laterality Date    CHOLECYSTECTOMY      COLONOSCOPY  03/17/2015    COLONOSCOPY Bilateral 07/20/2020    CORONARY ANGIOPLASTY WITH STENT PLACEMENT N/A     Excision of Skin Cancer (Arm)  N/A     EYE SURGERY      INJECTION OF ANESTHETIC AGENT AROUND MEDIAL BRANCH NERVES INNERVATING LUMBAR FACET JOINT  N/A 10/26/2022    Procedure: Block-nerve-medial branch-lumbar;  Surgeon: Samreen Horne MD;  Location: [facility] OR;  Service: Pain Management;  Laterality: N/A;  Left L4/L5    KNEE ARTHROSCOPY Left     TRANSFORAMINAL EPIDURAL INJECTION OF STEROID Left 1/5/2023    Procedure: INJECTION, STEROID, EPIDURAL, TRANSFORAMINAL APPROACH Left L5;  Surgeon: Samreen Horne MD;  Location: [facility] OR;  Service: Pain Management;  Laterality: Left;  L5    VASECTOMY N/A       Social History     Tobacco Use    Smoking status: Former     Current packs/day: 2.00     Average packs/day: 2.0 packs/day for 35.0 years (70.0 ttl pk-yrs)     Types: Cigarettes    Smokeless tobacco: Never   Substance Use Topics    Alcohol use: Yes     Alcohol/week: 9.0 standard drinks of alcohol     Types: 2 Glasses of wine, 2 Cans of beer, 3 Shots of liquor, 2 Drinks containing 0.5 oz of alcohol per week     Comment: twice weekly    Drug use: Never          Health Maintenance Due   Topic Date Due    Hepatitis C Screening  Never done    Foot Exam  Never done    TETANUS VACCINE  Never done    Shingles Vaccine (2 of 2) 12/23/2020    COVID-19 Vaccine (6 - Moderna series) 12/08/2022    Eye Exam  09/26/2023        SUBJECTIVE:     Review of Systems   Constitutional:  Negative for fatigue and fever.   HENT:  Negative for congestion, rhinorrhea, sore throat and trouble swallowing.    Eyes:  Negative for redness and visual disturbance.   Respiratory:  Negative for cough, chest tightness and shortness of breath.    Cardiovascular:  Negative for chest pain and palpitations.   Gastrointestinal:  Negative for abdominal pain, constipation, diarrhea, nausea and vomiting.   Genitourinary:  Negative for dysuria, flank pain, frequency and urgency.   Musculoskeletal:  Negative for arthralgias, gait problem and myalgias.   Skin:  Negative for rash and wound.   Neurological:  Negative for facial asymmetry, speech difficulty, weakness and headaches.   All other systems reviewed and are  negative.        Patient Reported Health Risk Assessment  What [age] 70-79  Are you male or female?: Male  During the past four weeks, how much have you been bothered by emotional problems such as feeling anxious, depressed, irritable, sad, or downhearted and blue?: Not at all  During the past five weeks, has your physical and/or emotional health limited your social activities with family, friends, neighbors, or groups?: Not at all  During the past four weeks, how much bodily pain have you generally had?: Very mild pain  During the past four weeks, was someone available to help if you needed and wanted help?: Yes, as much as I wanted  During the past four weeks, what was the hardest physical activity you could do for at least two minutes?: Very heavy  Can you get to places out of walking distance without help?  (For example, can you travel alone on buses or taxis, or drive your own car?): Yes  Can you go shopping for groceries or clothes without someone's help?: Yes  Can you prepare your own meals?: Yes  Can you do your own housework without help?: Yes  Because of any health problems, do you need the help of another person with your personal care needs such as eating, bathing, dressing, or getting around the house?: No  Can you handle your own money without help?: Yes  During the past four weeks, how would you rate your health in general?: Very good  How have things been going for you during the past four weeks?: Pretty well  Are you having difficulties driving your car?: No  Do you always fasten your seat belt when you are in a car?: Yes, usually  How often in the past four weeks have you been bothered by falling or dizzy when standing up?: Never  How often in the past four weeks have you been bothered by sexual problems?: Never  How often in the past four weeks have you been bothered by trouble eating well?: Never  How often in the past four weeks have you been bothered by teeth or denture problems?:  Never  How often in the past four weeks have you been bothered with problems using the telephone?: Never  How often in the past four weeks have you been bothered by tiredness or fatigue?: Never  Have you fallen two or more times in the past year?: No  Are you afraid of falling?: No  Are you a smoker?: No  During the past four weeks, how many drinks of wine, beer, or other alcoholic beverages did you have?: One drink or less per week  Do you exercise for about 20 minutes three or more days a week?: Yes, most of the time  Have you been given any information to help you with hazards in your house that might hurt you?: Yes  Have you been given any information to help you with keeping track of your medications?: Yes  How often do you have trouble taking medicines the way you've been told to take them?: I always take them as prescribed  How confident are you that you can control and manage most of your health problems?: Very confident  What is your race? (Check all that apply.):     OBJECTIVE:     /72 (BP Location: Left arm, Patient Position: Sitting, BP Method: Large (Automatic))   Pulse 71   Temp 97.7 °F (36.5 °C) (Temporal)   Resp 18   Wt 112.2 kg (247 lb 6.4 oz)   SpO2 96%   BMI 36.53 kg/m²      Physical Exam  Constitutional:       General: He is not in acute distress.     Appearance: Normal appearance. He is obese.   HENT:      Right Ear: Tympanic membrane, ear canal and external ear normal.      Left Ear: Tympanic membrane, ear canal and external ear normal.      Nose: Nose normal.      Mouth/Throat:      Mouth: Mucous membranes are moist.      Pharynx: Oropharynx is clear.   Eyes:      Extraocular Movements: Extraocular movements intact.      Conjunctiva/sclera: Conjunctivae normal.      Pupils: Pupils are equal, round, and reactive to light.   Cardiovascular:      Rate and Rhythm: Normal rate and regular rhythm.      Pulses: Normal pulses.      Heart sounds: Normal heart sounds. No murmur  heard.     No gallop.   Pulmonary:      Effort: Pulmonary effort is normal.      Breath sounds: Normal breath sounds. No wheezing.   Abdominal:      General: Bowel sounds are normal. There is no distension.      Palpations: Abdomen is soft. There is no mass.      Tenderness: There is no abdominal tenderness. There is no guarding.   Musculoskeletal:         General: Normal range of motion.   Skin:     General: Skin is warm and dry.   Neurological:      Mental Status: He is alert. Mental status is at baseline.      Sensory: No sensory deficit.      Motor: No weakness.                  No data to display                  10/3/2023     9:20 AM 9/19/2023     9:20 AM 7/5/2023     9:40 AM 4/27/2023     3:00 PM 3/13/2023     9:20 AM 1/19/2023     8:30 AM 12/5/2022     8:00 AM   Fall Risk Assessment - Outpatient   Mobility Status Ambulatory Ambulatory Ambulatory Ambulatory Ambulatory Ambulatory Ambulatory   Number of falls 0 0 0 1 0 0 0   Identified as fall risk False False False False False False False                 Depression Screening  Over the past two weeks, has the patient felt down, depressed, or hopeless?: No  Over the past two weeks, has the patient felt little interest or pleasure in doing things?: No  Functional Ability/Safety Screening  Was the patient's timed Up & Go test unsteady or longer than 30 seconds?: No  Does the patient need help with phone, transportation, shopping, preparing meals, housework, laundry, meds, or managing money?: No  Does the patient's home have rugs in the hallway, lack grab bars in the bathroom, lack handrails on the stairs or have poor lighting?: No  Have you noticed any hearing difficulties?: No  Cognitive Function (Assessed through direct observation with due consideration of information obtained by way of patient reports and/or concerns raised by family, friends, caretakers, or others)    Does the patient repeat questions/statements in the same day?: No  Does the patient have  trouble remembering the date, year, and time?: No  Does the patient have difficulty managing finances?: No  Does the patient have a decreased sense of direction?: No      ASSEMENT/ PLAN:       ICD-10-CM ICD-9-CM   1. Iron deficiency anemia secondary to inadequate dietary iron intake  D50.8 280.1   2. Hypertension, unspecified type  I10 401.9   3. Hypothyroidism, unspecified type  E03.9 244.9   4. Type 2 diabetes mellitus with diabetic polyneuropathy, without long-term current use of insulin  E11.42 250.60     357.2   5. Gastroesophageal reflux disease without esophagitis  K21.9 530.81   6. Gout, unspecified cause, unspecified chronicity, unspecified site  M10.9 274.9   7. Hyperlipidemia, unspecified hyperlipidemia type  E78.5 272.4         Plan:     Problem List Items Addressed This Visit          Cardiac/Vascular    Hypertension (Chronic)    Relevant Medications    amLODIPine (NORVASC) 10 MG tablet    Hyperlipidemia (Chronic)     -stable   -currently on fenofibrate, continue   -encouraged low cholesterol diet            Endocrine    Type 2 diabetes mellitus with diabetic polyneuropathy, without long-term current use of insulin (Chronic)    Relevant Orders    Hemoglobin A1C    Hypothyroidism (Chronic)    Relevant Orders    TSH       GI    Gastroesophageal reflux disease without esophagitis (Chronic)     -stable   -currently on omeprazole, continue  -avoid food triggers            Orthopedic    Gout (Chronic)     -stable   -currently on allopurinol, continue          Other Visit Diagnoses       Iron deficiency anemia secondary to inadequate dietary iron intake    -  Primary    Relevant Medications    amLODIPine (NORVASC) 10 MG tablet    Other Relevant Orders    Ferritin    Iron and TIBC    Iron    Vitamin B12    Reticulocytes    TSH    Hemoglobin A1C            Advance Care Planning   I attest to discussing Advance Care Planning with patient and/or family member.  Education was provided including the importance of  the Health Care Power of , Advance Directives, and/or [place] documentation.       Opioid Screening: Patient medication list reviewed, patient is not taking prescription opioids. Patient is not using additional opioids than prescribed. Patient is at low risk of substance abuse based on this opioid use history.         Follow up in about 4 months (around 2/3/2024) for Diabetes, Thyroid, iron, with Dr. Negrete, with labs prior.   -plan specifics discussed above    Orders Placed This Encounter    Ferritin    Iron and TIBC    Iron    Vitamin B12    Reticulocytes    TSH    Hemoglobin A1C    amLODIPine (NORVASC) 10 MG tablet        Medication List with Changes/Refills   Current Medications    ALLOPURINOL (ZYLOPRIM) 300 MG TABLET    Take 1 tablet (300 mg total) by mouth once daily.    ASPIRIN 81 MG CAP    Take 1 tablet by mouth once daily.    DICLOFENAC (VOLTAREN) 75 MG EC TABLET    Take 1 tablet (75 mg total) by mouth 2 (two) times daily.    FENOFIBRATE 160 MG TAB    Take 1 tablet by mouth once daily.    GLIMEPIRIDE (AMARYL) 1 MG TABLET    Take 1 tablet by mouth twice daily.    GLIMEPIRIDE (AMARYL) 2 MG TABLET    Take 0.5 tablets (1 mg total) by mouth 2 (two) times a day.    GLUCOSAMINE-CHONDROITIN 250-200 MG TAB    Take 1 tablet by mouth Daily.    IRON-VIT C-B12-FOLIC ACID (IRON 100 PLUS) TAB    1 tablet Daily.    LEVOTHYROXINE (SYNTHROID, LEVOTHROID) 175 MCG TABLET    Take 1 tablet (175 mcg total) by mouth before breakfast.    LISINOPRIL 10 MG TABLET    Take 1 tablet (10 mg total) by mouth 2 (two) times daily.    METFORMIN (GLUCOPHAGE) 500 MG TABLET    Take 1/2 tablet bid    OMEPRAZOLE (PRILOSEC) 40 MG CAPSULE    Take 1 capsule by mouth daily.    TRAZODONE (DESYREL) 50 MG TABLET    Take 1 tablet (50 mg total) by mouth every evening.   Changed and/or Refilled Medications    Modified Medication Previous Medication    AMLODIPINE (NORVASC) 10 MG TABLET amLODIPine (NORVASC) 5 MG tablet       Take 1 tablet (10 mg total)  by mouth once daily.    Take 1 tablet (5 mg total) by mouth once daily.

## 2023-11-07 ENCOUNTER — PATIENT OUTREACH (OUTPATIENT)
Dept: ADMINISTRATIVE | Facility: HOSPITAL | Age: 73
End: 2023-11-07
Payer: MEDICARE

## 2023-11-07 NOTE — PROGRESS NOTES
Records Received, hyper-linked into chart at this time. The following record(s)  below were uploaded for Health Maintenance .             10/02/2023 DM EYE EXAM

## 2023-11-14 DIAGNOSIS — E11.42 TYPE 2 DIABETES MELLITUS WITH DIABETIC POLYNEUROPATHY, WITHOUT LONG-TERM CURRENT USE OF INSULIN: ICD-10-CM

## 2023-11-14 RX ORDER — GLIMEPIRIDE 1 MG/1
1 TABLET ORAL 2 TIMES DAILY
Qty: 180 TABLET | Refills: 2 | Status: SHIPPED | OUTPATIENT
Start: 2023-11-14

## 2023-11-30 ENCOUNTER — OFFICE VISIT (OUTPATIENT)
Dept: INTERNAL MEDICINE | Facility: CLINIC | Age: 73
End: 2023-11-30
Payer: MEDICARE

## 2023-11-30 VITALS
BODY MASS INDEX: 36.24 KG/M2 | WEIGHT: 245.38 LBS | OXYGEN SATURATION: 97 % | DIASTOLIC BLOOD PRESSURE: 80 MMHG | SYSTOLIC BLOOD PRESSURE: 134 MMHG | TEMPERATURE: 98 F | HEART RATE: 71 BPM | RESPIRATION RATE: 18 BRPM

## 2023-11-30 DIAGNOSIS — M67.40 GANGLION CYST: Primary | ICD-10-CM

## 2023-11-30 DIAGNOSIS — R20.9 SENSATION DISORDER: ICD-10-CM

## 2023-11-30 DIAGNOSIS — R20.8 DECREASED SENSATION OF HAND AND ARM: ICD-10-CM

## 2023-11-30 PROCEDURE — 99214 OFFICE O/P EST MOD 30 MIN: CPT | Mod: ,,,

## 2023-11-30 PROCEDURE — 99214 PR OFFICE/OUTPT VISIT, EST, LEVL IV, 30-39 MIN: ICD-10-PCS | Mod: ,,,

## 2023-11-30 RX ORDER — CEPHALEXIN 500 MG/1
500 CAPSULE ORAL EVERY 8 HOURS
Qty: 15 CAPSULE | Refills: 0 | Status: SHIPPED | OUTPATIENT
Start: 2023-11-30 | End: 2023-12-05

## 2023-11-30 NOTE — ASSESSMENT & PLAN NOTE
-acute   -attempted aspiration with 1 cc of approximately removed   -initiate Keflex prophylactically since patient diabetic  -referred to hand specialist for further evaluation

## 2023-11-30 NOTE — ASSESSMENT & PLAN NOTE
-3rd 4th and 5th right hand digit   -likely some component of nerve compression due to size of known ganglion cyst  -referred to hand specialist for further evaluation

## 2023-11-30 NOTE — PROGRESS NOTES
Patient ID: Darwin Fletcher is a 73 y.o. male.    Chief Complaint: Cyst (Possible cyst on right hand, pt states it has been there for months but has grown in size since he first noticed it also states it has gotten way harder than it was when he first noticed it, can't move three of his fingers has a pain level of 5 when trying to use that hand )    72-year-old male here today for  wellness visit as a Dr. Negrete patient.  Medical comorbidities include CAD, HTN, HLD, dm 2, hypothyroidism, sleep apnea, and gout.  Presents with a right wrist cyst.  Onset of symptoms within last 3 weeks.  Nonpainful.  Denies known traumas or over exertions.  No fevers, chills, or pain.  Does have some decreased sensitivity to 3rd 4th 5th digit since noting the cyst.  Discussed drainage of cyst as well as referral to hand surgery for evaluation of decreased sensation in cysts.  Otherwise patient generally well.     Medicare wellness: 10/03/2023                   MEDICAL HISTORY:    Past Medical History:   Diagnosis Date    Arthritis     Bulging of lumbar intervertebral disc     CAD (coronary artery disease)     Class 2 severe obesity due to excess calories with serious comorbidity and body mass index (BMI) of 36.0 to 36.9 in adult 7/5/2023    Gout 06/06/2022    Hypertension 06/06/2022    Hypothyroidism 06/06/2022    Personal history of colonic polyps     Sleep apnea 06/06/2022    uses CPAP uses CPAP    Type 2 diabetes mellitus with diabetic polyneuropathy, without long-term current use of insulin 06/06/2022      Past Surgical History:   Procedure Laterality Date    CHOLECYSTECTOMY      COLONOSCOPY  03/17/2015    COLONOSCOPY Bilateral 07/20/2020    CORONARY ANGIOPLASTY WITH STENT PLACEMENT N/A     Excision of Skin Cancer (Arm)  N/A     EYE SURGERY      INJECTION OF ANESTHETIC AGENT AROUND MEDIAL BRANCH NERVES INNERVATING LUMBAR FACET JOINT N/A 10/26/2022    Procedure: Block-nerve-medial branch-lumbar;  Surgeon: Samreen Horne MD;   Location: [facility] OR;  Service: Pain Management;  Laterality: N/A;  Left L4/L5    KNEE ARTHROSCOPY Left     TRANSFORAMINAL EPIDURAL INJECTION OF STEROID Left 1/5/2023    Procedure: INJECTION, STEROID, EPIDURAL, TRANSFORAMINAL APPROACH Left L5;  Surgeon: Samreen Horne MD;  Location: [facility] OR;  Service: Pain Management;  Laterality: Left;  L5    VASECTOMY N/A       Social History     Tobacco Use    Smoking status: Former     Current packs/day: 2.00     Average packs/day: 2.0 packs/day for 35.0 years (70.0 ttl pk-yrs)     Types: Cigarettes    Smokeless tobacco: Never   Substance Use Topics    Alcohol use: Yes     Alcohol/week: 9.0 standard drinks of alcohol     Types: 2 Glasses of wine, 2 Cans of beer, 3 Shots of liquor, 2 Drinks containing 0.5 oz of alcohol per week     Comment: twice weekly    Drug use: Never          Health Maintenance Due   Topic Date Due    Hepatitis C Screening  Never done    Foot Exam  Never done    TETANUS VACCINE  Never done    RSV Vaccine (Age 60+ and Pregnant patients) (1 - 1-dose 60+ series) Never done    Shingles Vaccine (2 of 2) 12/23/2020          Patient Care Team:  Vanessa Stout MD as PCP - General (Internal Medicine)      Review of Systems   Constitutional:  Negative for fatigue and fever.   HENT:  Negative for congestion, rhinorrhea, sore throat and trouble swallowing.    Eyes:  Negative for redness and visual disturbance.   Respiratory:  Negative for cough, chest tightness and shortness of breath.    Cardiovascular:  Negative for chest pain and palpitations.   Gastrointestinal:  Negative for abdominal pain, constipation, diarrhea, nausea and vomiting.   Genitourinary:  Negative for dysuria, flank pain, frequency and urgency.   Musculoskeletal:  Negative for arthralgias, gait problem and myalgias.   Skin:  Negative for rash and wound.        Hand cyst   Neurological:  Negative for facial asymmetry, speech difficulty, weakness and headaches.   All other systems reviewed and  are negative.      Objective:   /80 (BP Location: Left arm, Patient Position: Sitting, BP Method: Large (Automatic))   Pulse 71   Temp 98 °F (36.7 °C) (Temporal)   Resp 18   Wt 111.3 kg (245 lb 6.4 oz)   SpO2 97%   BMI 36.24 kg/m²      Physical Exam  Constitutional:       General: He is not in acute distress.     Appearance: Normal appearance.   HENT:      Right Ear: Tympanic membrane, ear canal and external ear normal.      Left Ear: Tympanic membrane, ear canal and external ear normal.      Nose: Nose normal.      Mouth/Throat:      Mouth: Mucous membranes are moist.      Pharynx: Oropharynx is clear.   Eyes:      Extraocular Movements: Extraocular movements intact.      Conjunctiva/sclera: Conjunctivae normal.      Pupils: Pupils are equal, round, and reactive to light.   Cardiovascular:      Rate and Rhythm: Normal rate and regular rhythm.      Pulses: Normal pulses.      Heart sounds: Normal heart sounds. No murmur heard.     No gallop.   Pulmonary:      Effort: Pulmonary effort is normal.      Breath sounds: Normal breath sounds. No wheezing.   Abdominal:      General: Bowel sounds are normal. There is no distension.      Palpations: Abdomen is soft. There is no mass.      Tenderness: There is no abdominal tenderness. There is no guarding.   Musculoskeletal:         General: Normal range of motion.      Right hand: No tenderness. Normal range of motion. Normal strength. Decreased sensation. Normal capillary refill.   Skin:     General: Skin is warm and dry.             Comments: Left hand superficial cyst movable, nontender   Neurological:      Mental Status: He is alert. Mental status is at baseline.      Sensory: No sensory deficit.      Motor: No weakness.           Assessment:       ICD-10-CM ICD-9-CM   1. Ganglion cyst  M67.40 727.43   2. Decreased sensation of hand and arm  R20.8 782.0   3. Sensation disorder  R20.9 782.0        Plan:     Problem List Items Addressed This Visit          Neuro     Sensation disorder     -3rd 4th and 5th right hand digit   -likely some component of nerve compression due to size of known ganglion cyst  -referred to hand specialist for further evaluation         Relevant Orders    Ambulatory referral/consult to Hand Surgery    RESOLVED: Decreased sensation of hand and arm       Orthopedic    Ganglion cyst - Primary     -acute   -attempted aspiration with 1 cc of approximately removed   -initiate Keflex prophylactically since patient diabetic  -referred to hand specialist for further evaluation         Relevant Orders    Ambulatory referral/consult to Hand Surgery          Follow up for Previously scheduled and PRN if need.   -plan specifics discussed above    Orders Placed This Encounter    Ambulatory referral/consult to Hand Surgery    cephALEXin (KEFLEX) 500 MG capsule        Medication List with Changes/Refills   New Medications    CEPHALEXIN (KEFLEX) 500 MG CAPSULE    Take 1 capsule (500 mg total) by mouth every 8 (eight) hours. for 5 days   Current Medications    ALLOPURINOL (ZYLOPRIM) 300 MG TABLET    Take 1 tablet (300 mg total) by mouth once daily.    AMLODIPINE (NORVASC) 10 MG TABLET    Take 1 tablet (10 mg total) by mouth once daily.    ASPIRIN 81 MG CAP    Take 1 tablet by mouth once daily.    DICLOFENAC (VOLTAREN) 75 MG EC TABLET    Take 1 tablet (75 mg total) by mouth 2 (two) times daily.    FENOFIBRATE 160 MG TAB    Take 1 tablet by mouth once daily.    GLIMEPIRIDE (AMARYL) 1 MG TABLET    Take 1 tablet (1 mg total) by mouth 2 (two) times daily.    GLIMEPIRIDE (AMARYL) 2 MG TABLET    Take 0.5 tablets (1 mg total) by mouth 2 (two) times a day.    GLUCOSAMINE-CHONDROITIN 250-200 MG TAB    Take 1 tablet by mouth Daily.    IRON-VIT C-B12-FOLIC ACID (IRON 100 PLUS) TAB    1 tablet Daily.    LEVOTHYROXINE (SYNTHROID, LEVOTHROID) 175 MCG TABLET    Take 1 tablet (175 mcg total) by mouth before breakfast.    LISINOPRIL 10 MG TABLET    Take 1 tablet (10 mg total) by mouth  2 (two) times daily.    METFORMIN (GLUCOPHAGE) 500 MG TABLET    Take 1/2 tablet bid    OMEPRAZOLE (PRILOSEC) 40 MG CAPSULE    Take 1 capsule by mouth daily.    TRAZODONE (DESYREL) 50 MG TABLET    Take 1 tablet (50 mg total) by mouth every evening.

## 2023-12-11 ENCOUNTER — HOSPITAL ENCOUNTER (OUTPATIENT)
Dept: RADIOLOGY | Facility: CLINIC | Age: 73
Discharge: HOME | End: 2023-12-11
Attending: STUDENT IN AN ORGANIZED HEALTH CARE EDUCATION/TRAINING PROGRAM
Payer: MEDICARE

## 2023-12-11 ENCOUNTER — OFFICE VISIT (OUTPATIENT)
Dept: ORTHOPEDICS | Facility: CLINIC | Age: 73
End: 2023-12-11
Payer: MEDICARE

## 2023-12-11 VITALS
SYSTOLIC BLOOD PRESSURE: 150 MMHG | HEART RATE: 66 BPM | BODY MASS INDEX: 36.34 KG/M2 | WEIGHT: 245.38 LBS | HEIGHT: 69 IN | DIASTOLIC BLOOD PRESSURE: 73 MMHG

## 2023-12-11 DIAGNOSIS — M66.231 EXTENSOR TENDON RUPTURE, NON-TRAUMATIC, HAND AND WRIST, RIGHT: Primary | ICD-10-CM

## 2023-12-11 DIAGNOSIS — M67.431 GANGLION CYST OF WRIST, RIGHT: ICD-10-CM

## 2023-12-11 DIAGNOSIS — M66.241 EXTENSOR TENDON RUPTURE, NON-TRAUMATIC, HAND AND WRIST, RIGHT: Primary | ICD-10-CM

## 2023-12-11 DIAGNOSIS — M67.40 GANGLION CYST: ICD-10-CM

## 2023-12-11 DIAGNOSIS — R20.9 SENSATION DISORDER: ICD-10-CM

## 2023-12-11 PROCEDURE — 73130 XR HAND COMPLETE 3 VIEW RIGHT: ICD-10-PCS | Mod: RT,,, | Performed by: STUDENT IN AN ORGANIZED HEALTH CARE EDUCATION/TRAINING PROGRAM

## 2023-12-11 PROCEDURE — 73130 X-RAY EXAM OF HAND: CPT | Mod: RT,,, | Performed by: STUDENT IN AN ORGANIZED HEALTH CARE EDUCATION/TRAINING PROGRAM

## 2023-12-11 PROCEDURE — 99203 OFFICE O/P NEW LOW 30 MIN: CPT | Mod: ,,, | Performed by: STUDENT IN AN ORGANIZED HEALTH CARE EDUCATION/TRAINING PROGRAM

## 2023-12-11 PROCEDURE — 99203 PR OFFICE/OUTPT VISIT, NEW, LEVL III, 30-44 MIN: ICD-10-PCS | Mod: ,,, | Performed by: STUDENT IN AN ORGANIZED HEALTH CARE EDUCATION/TRAINING PROGRAM

## 2023-12-11 RX ORDER — AMLODIPINE BESYLATE 5 MG/1
5 TABLET ORAL DAILY
Status: ON HOLD | COMMUNITY
Start: 2023-11-30 | End: 2024-02-22 | Stop reason: HOSPADM

## 2023-12-12 NOTE — PROGRESS NOTES
Chief Complaint:  Right wrist and finger pain    Consulting Physician: Santiago Garcia FNP    History of present illness:    Patient is a 73-year-old male who presents for initial evaluation of his right wrist and finger pain.  He has had wrist pain for the last several months.  For about the last 3 months he has been unable to extend his middle, ring, small fingers.  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  He has a cyst on the dorsum of his wrist which his primary care doctor attempted to aspirate.  Only 1 cc of fluid was aspirated in the cyst remained.  He denies any numbness or tingling into the fingertips.  He has type 2 diabetes, gout and arthritis.  He has never been told that he has rheumatoid arthritis.    Past Medical History:   Diagnosis Date    Arthritis     Bulging of lumbar intervertebral disc     CAD (coronary artery disease)     Class 2 severe obesity due to excess calories with serious comorbidity and body mass index (BMI) of 36.0 to 36.9 in adult 7/5/2023    Gout 06/06/2022    Hypertension 06/06/2022    Hypothyroidism 06/06/2022    Personal history of colonic polyps     Sleep apnea 06/06/2022    uses CPAP uses CPAP    Type 2 diabetes mellitus with diabetic polyneuropathy, without long-term current use of insulin 06/06/2022       Past Surgical History:   Procedure Laterality Date    CHOLECYSTECTOMY      COLONOSCOPY  03/17/2015    COLONOSCOPY Bilateral 07/20/2020    CORONARY ANGIOPLASTY WITH STENT PLACEMENT N/A     Excision of Skin Cancer (Arm)  N/A     EYE SURGERY      INJECTION OF ANESTHETIC AGENT AROUND MEDIAL BRANCH NERVES INNERVATING LUMBAR FACET JOINT N/A 10/26/2022    Procedure: Block-nerve-medial branch-lumbar;  Surgeon: Samreen Horne MD;  Location: [facility];  Service: Pain Management;  Laterality: N/A;  Left L4/L5    KNEE ARTHROSCOPY Left     TRANSFORAMINAL EPIDURAL INJECTION OF STEROID Left 1/5/2023    Procedure: INJECTION, STEROID,  EPIDURAL, TRANSFORAMINAL APPROACH Left L5;  Surgeon: Samreen Horne MD;  Location: [facility] OR;  Service: Pain Management;  Laterality: Left;  L5    VASECTOMY N/A        Current Outpatient Medications   Medication Sig    allopurinoL (ZYLOPRIM) 300 MG tablet Take 1 tablet (300 mg total) by mouth once daily.    amLODIPine (NORVASC) 5 MG tablet Take 5 mg by mouth once daily.    aspirin 81 mg Cap Take 1 tablet by mouth once daily.    diclofenac (VOLTAREN) 75 MG EC tablet Take 1 tablet (75 mg total) by mouth 2 (two) times daily.    fenofibrate 160 MG Tab Take 1 tablet by mouth once daily.    glimepiride (AMARYL) 1 MG tablet Take 1 tablet (1 mg total) by mouth 2 (two) times daily.    glucosamine-chondroitin 250-200 mg Tab Take 1 tablet by mouth Daily.    iron-vit c-b12-folic acid (IRON 100 PLUS) Tab 1 tablet Daily.    levothyroxine (SYNTHROID, LEVOTHROID) 175 MCG tablet Take 1 tablet (175 mcg total) by mouth before breakfast.    lisinopriL 10 MG tablet Take 1 tablet (10 mg total) by mouth 2 (two) times daily.    metFORMIN (GLUCOPHAGE) 500 MG tablet Take 1/2 tablet bid    omeprazole (PRILOSEC) 40 MG capsule Take 1 capsule by mouth daily.    traZODone (DESYREL) 50 MG tablet Take 1 tablet (50 mg total) by mouth every evening.     No current facility-administered medications for this visit.       Review of patient's allergies indicates:  No Known Allergies    Family History   Problem Relation Age of Onset    Heart attack Mother     Lung cancer Father     Cancer Father        Social History     Socioeconomic History    Marital status:    Tobacco Use    Smoking status: Former     Current packs/day: 2.00     Average packs/day: 2.0 packs/day for 35.0 years (70.0 ttl pk-yrs)     Types: Cigarettes    Smokeless tobacco: Never   Substance and Sexual Activity    Alcohol use: Yes     Alcohol/week: 9.0 standard drinks of alcohol     Types: 2 Glasses of wine, 2 Cans of beer, 3 Shots of liquor, 2 Drinks containing 0.5 oz of alcohol  per week     Comment: twice weekly    Drug use: Never    Sexual activity: Yes     Partners: Female     Social Determinants of Health     Financial Resource Strain: Low Risk  (4/27/2023)    Overall Financial Resource Strain (CARDIA)     Difficulty of Paying Living Expenses: Not hard at all   Food Insecurity: No Food Insecurity (4/27/2023)    Hunger Vital Sign     Worried About Running Out of Food in the Last Year: Never true     Ran Out of Food in the Last Year: Never true   Transportation Needs: No Transportation Needs (4/27/2023)    PRAPARE - Transportation     Lack of Transportation (Medical): No     Lack of Transportation (Non-Medical): No   Physical Activity: Sufficiently Active (4/27/2023)    Exercise Vital Sign     Days of Exercise per Week: 3 days     Minutes of Exercise per Session: 50 min   Stress: No Stress Concern Present (4/27/2023)    [employer] [place] [employer] - Occupational Stress Questionnaire     Feeling of Stress : Not at all   Social Connections: Unknown (4/27/2023)    Social Connection and Isolation Panel [NHANES]     Frequency of Communication with Friends and Family: More than three times a week     Frequency of Social Gatherings with Friends and Family: More than three times a week     Attends Sabianist Services: Patient refused     Active Member of Clubs or Organizations: Patient refused     Attends Club or Organization Meetings: Patient refused     Marital Status:    Housing Stability: Low Risk  (4/27/2023)    Housing Stability Vital Sign     Unable to Pay for Housing in the Last Year: No     Number of Places Lived in the Last Year: 1     Unstable Housing in the Last Year: No       Review of Systems:    Constitution:   Denies chills, fever, and sweats.  HENT:   Denies headaches or blurry vision.  Cardiovascular:  Denies chest pain or irregular heart beat.  Respiratory:   Denies cough or shortness of breath.  Gastrointestinal:  Denies abdominal pain, nausea, or  "vomiting.  Musculoskeletal:   Denies muscle cramps.  Neurological:   Denies dizziness or focal weakness.  Psychiatric/Behavior: Normal mental status.  Hematology/Lymph:  Denies bleeding problem or easy bruising/bleeding.  Skin:    Denies rash or suspicious lesions.    Examination:    Vital Signs:    Vitals:    12/11/23 1341 12/11/23 1342 12/11/23 1350   BP: (!) 151/72 (!) 150/73    Pulse: (!) 59 66    Weight: 111.3 kg (245 lb 6 oz)     Height: 5' 9" (1.753 m)     PainSc:     5       Body mass index is 36.24 kg/m².    Constitution:   Well-developed, well nourished patient in no acute distress.  Neurological:   Alert and oriented x 3 and cooperative to examination.     Psychiatric/Behavior: Normal mental status.  Respiratory:   No shortness of breath.  Eyes:    Extraoccular muscles intact  Skin:    No scars, rash or suspicious lesions.    MSK:   Right upper extremity:  No open wounds or rashes.  Swelling dorsally over the 1st dorsal compartment at the wrist.  Tenderness to palpation over the 4th dorsal compartment at the wrist.  Tenderness to palpation of the distal radioulnar joint.  Tenderness to palpation over the radiocarpal joint.  Wrist range of motion is 45° of extension, 50° of extension, 60° of supination, 70° of pronation.  Distal radioulnar joint is stable.  He has full passive range of motion of his digits.  He is unable to extend his middle, ring, small fingers at the metacarpophalangeal joints.  He is able to extend at the interphalangeal joints.  He is able to flex at the FDS and FDP can curl his fingers into a fist and touch distal palmar crease.  Sensation light touch intact to median ulnar radial distribution.  Radial pulse 2 +hand is warm well perfused    Imaging:   X-ray of the right wrist three views shows severe arthritic changes at the distal radioulnar joint with ulnar subluxation of the carpus     Assessment:  Extensor tendon ruptures of right middle, ring, small fingers, extensor " tenosynovitis of the 4th dorsal compartment, severe distal radioulnar joint arthritis, possible rheumatoid arthritis    Plan:  This presentation looks to be consistent with rheumatoid arthritis.  He has likely extensor tendon ruptures of the right middle, ring, small fingers secondary to an inflammatory synovitis.  I will send him for an MRI of the right wrist to confirm that his extensor tendons have ruptured.  If they have ruptured he will need tendon transfers to restore this.  I will see him back after the MRI to discuss different treatment options.    Follow Up:  After MRI  Xray at next visit:  None

## 2023-12-13 DIAGNOSIS — M10.9 GOUT, UNSPECIFIED CAUSE, UNSPECIFIED CHRONICITY, UNSPECIFIED SITE: ICD-10-CM

## 2023-12-14 DIAGNOSIS — I10 HYPERTENSION, UNSPECIFIED TYPE: Chronic | ICD-10-CM

## 2023-12-14 DIAGNOSIS — D50.8 IRON DEFICIENCY ANEMIA SECONDARY TO INADEQUATE DIETARY IRON INTAKE: ICD-10-CM

## 2023-12-14 RX ORDER — ALLOPURINOL 300 MG/1
300 TABLET ORAL
Qty: 90 TABLET | Refills: 3 | Status: SHIPPED | OUTPATIENT
Start: 2023-12-14

## 2023-12-14 RX ORDER — AMLODIPINE BESYLATE 10 MG/1
10 TABLET ORAL
Qty: 90 TABLET | Refills: 3 | Status: SHIPPED | OUTPATIENT
Start: 2023-12-14

## 2023-12-18 ENCOUNTER — OFFICE VISIT (OUTPATIENT)
Dept: ORTHOPEDICS | Facility: CLINIC | Age: 73
End: 2023-12-18
Payer: MEDICARE

## 2023-12-18 VITALS
WEIGHT: 245.38 LBS | HEART RATE: 64 BPM | SYSTOLIC BLOOD PRESSURE: 131 MMHG | BODY MASS INDEX: 36.34 KG/M2 | DIASTOLIC BLOOD PRESSURE: 71 MMHG | HEIGHT: 69 IN

## 2023-12-18 DIAGNOSIS — M66.231 EXTENSOR TENDON RUPTURE, NON-TRAUMATIC, HAND AND WRIST, RIGHT: Primary | ICD-10-CM

## 2023-12-18 DIAGNOSIS — S66.811A EXTENSOR TENDON RUPTURE OF HAND, RIGHT, INITIAL ENCOUNTER: ICD-10-CM

## 2023-12-18 DIAGNOSIS — Z01.818 PREOP EXAMINATION: ICD-10-CM

## 2023-12-18 DIAGNOSIS — M66.241 EXTENSOR TENDON RUPTURE, NON-TRAUMATIC, HAND AND WRIST, RIGHT: Primary | ICD-10-CM

## 2023-12-18 PROCEDURE — 99214 OFFICE O/P EST MOD 30 MIN: CPT | Mod: ,,, | Performed by: STUDENT IN AN ORGANIZED HEALTH CARE EDUCATION/TRAINING PROGRAM

## 2023-12-18 PROCEDURE — 99214 PR OFFICE/OUTPT VISIT, EST, LEVL IV, 30-39 MIN: ICD-10-PCS | Mod: ,,, | Performed by: STUDENT IN AN ORGANIZED HEALTH CARE EDUCATION/TRAINING PROGRAM

## 2023-12-18 RX ORDER — SODIUM CHLORIDE 9 MG/ML
INJECTION, SOLUTION INTRAVENOUS CONTINUOUS
Status: CANCELLED | OUTPATIENT
Start: 2023-12-18

## 2023-12-18 NOTE — H&P (VIEW-ONLY)
Chief Complaint:  Right wrist and finger pain    Consulting Physician: No ref. provider found    History of present illness:    Patient is a 73-year-old male who presents for follow-up evaluation of his right wrist and finger pain.  He has had wrist pain for the last several months.  For about the last 3 months he has been unable to extend his middle, ring, small fingers.  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  He has a cyst on the dorsum of his wrist which his primary care doctor attempted to aspirate.  Only 1 cc of fluid was aspirated in the cyst remained.  He denies any numbness or tingling into the fingertips.  He has type 2 diabetes, gout and arthritis.  He has never been told that he has rheumatoid arthritis.    I saw him in my clinic last time where her I ordered a MRI to better evaluate for extensor tendon ruptures.  He is here for the results.  He has had no improvement in his symptoms    Past Medical History:   Diagnosis Date    Arthritis     Bulging of lumbar intervertebral disc     CAD (coronary artery disease)     Class 2 severe obesity due to excess calories with serious comorbidity and body mass index (BMI) of 36.0 to 36.9 in adult 7/5/2023    Gout 06/06/2022    Hypertension 06/06/2022    Hypothyroidism 06/06/2022    Personal history of colonic polyps     Sleep apnea 06/06/2022    uses CPAP uses CPAP    Type 2 diabetes mellitus with diabetic polyneuropathy, without long-term current use of insulin 06/06/2022       Past Surgical History:   Procedure Laterality Date    CHOLECYSTECTOMY      COLONOSCOPY  03/17/2015    COLONOSCOPY Bilateral 07/20/2020    CORONARY ANGIOPLASTY WITH STENT PLACEMENT N/A     Excision of Skin Cancer (Arm)  N/A     EYE SURGERY      INJECTION OF ANESTHETIC AGENT AROUND MEDIAL BRANCH NERVES INNERVATING LUMBAR FACET JOINT N/A 10/26/2022    Procedure: Block-nerve-medial branch-lumbar;  Surgeon: Samreen Horne MD;  Location: [facility]  OR;  Service: Pain Management;  Laterality: N/A;  Left L4/L5    KNEE ARTHROSCOPY Left     TRANSFORAMINAL EPIDURAL INJECTION OF STEROID Left 1/5/2023    Procedure: INJECTION, STEROID, EPIDURAL, TRANSFORAMINAL APPROACH Left L5;  Surgeon: Samreen Horne MD;  Location: [facility] OR;  Service: Pain Management;  Laterality: Left;  L5    VASECTOMY N/A        Current Outpatient Medications   Medication Sig    allopurinoL (ZYLOPRIM) 300 MG tablet Take 1 tablet by mouth once daily.    amLODIPine (NORVASC) 10 MG tablet Take 1 tablet by mouth once daily.    amLODIPine (NORVASC) 5 MG tablet Take 5 mg by mouth once daily.    aspirin 81 mg Cap Take 1 tablet by mouth once daily.    diclofenac (VOLTAREN) 75 MG EC tablet Take 1 tablet (75 mg total) by mouth 2 (two) times daily.    fenofibrate 160 MG Tab Take 1 tablet by mouth once daily.    glimepiride (AMARYL) 1 MG tablet Take 1 tablet (1 mg total) by mouth 2 (two) times daily.    glucosamine-chondroitin 250-200 mg Tab Take 1 tablet by mouth Daily.    iron-vit c-b12-folic acid (IRON 100 PLUS) Tab 1 tablet Daily.    levothyroxine (SYNTHROID, LEVOTHROID) 175 MCG tablet Take 1 tablet (175 mcg total) by mouth before breakfast.    lisinopriL 10 MG tablet Take 1 tablet (10 mg total) by mouth 2 (two) times daily.    metFORMIN (GLUCOPHAGE) 500 MG tablet Take 1/2 tablet bid    omeprazole (PRILOSEC) 40 MG capsule Take 1 capsule by mouth daily.    traZODone (DESYREL) 50 MG tablet Take 1 tablet (50 mg total) by mouth every evening.     No current facility-administered medications for this visit.       Review of patient's allergies indicates:  No Known Allergies    Family History   Problem Relation Age of Onset    Heart attack Mother     Lung cancer Father     Cancer Father        Social History     Socioeconomic History    Marital status:    Tobacco Use    Smoking status: Former     Current packs/day: 2.00     Average packs/day: 2.0 packs/day for 35.0 years (70.0 ttl pk-yrs)     Types:  Cigarettes    Smokeless tobacco: Never   Substance and Sexual Activity    Alcohol use: Yes     Alcohol/week: 9.0 standard drinks of alcohol     Types: 2 Glasses of wine, 2 Cans of beer, 3 Shots of liquor, 2 Drinks containing 0.5 oz of alcohol per week     Comment: twice weekly    Drug use: Never    Sexual activity: Yes     Partners: Female     Social Determinants of Health     Financial Resource Strain: Low Risk  (4/27/2023)    Overall Financial Resource Strain (CARDIA)     Difficulty of Paying Living Expenses: Not hard at all   Food Insecurity: No Food Insecurity (4/27/2023)    Hunger Vital Sign     Worried About Running Out of Food in the Last Year: Never true     Ran Out of Food in the Last Year: Never true   Transportation Needs: No Transportation Needs (4/27/2023)    PRAPARE - Transportation     Lack of Transportation (Medical): No     Lack of Transportation (Non-Medical): No   Physical Activity: Sufficiently Active (4/27/2023)    Exercise Vital Sign     Days of Exercise per Week: 3 days     Minutes of Exercise per Session: 50 min   Stress: No Stress Concern Present (4/27/2023)    [employer] [place] [employer] - Occupational Stress Questionnaire     Feeling of Stress : Not at all   Social Connections: Unknown (4/27/2023)    Social Connection and Isolation Panel [NHANES]     Frequency of Communication with Friends and Family: More than three times a week     Frequency of Social Gatherings with Friends and Family: More than three times a week     Attends Episcopalian Services: Patient declined     Active Member of Clubs or Organizations: Patient declined     Attends Club or Organization Meetings: Patient declined     Marital Status:    Housing Stability: Low Risk  (4/27/2023)    Housing Stability Vital Sign     Unable to Pay for Housing in the Last Year: No     Number of Places Lived in the Last Year: 1     Unstable Housing in the Last Year: No       Review of Systems:    Constitution:   Denies  "chills, fever, and sweats.  HENT:   Denies headaches or blurry vision.  Cardiovascular:  Denies chest pain or irregular heart beat.  Respiratory:   Denies cough or shortness of breath.  Gastrointestinal:  Denies abdominal pain, nausea, or vomiting.  Musculoskeletal:   Denies muscle cramps.  Neurological:   Denies dizziness or focal weakness.  Psychiatric/Behavior: Normal mental status.  Hematology/Lymph:  Denies bleeding problem or easy bruising/bleeding.  Skin:    Denies rash or suspicious lesions.    Examination:    Vital Signs:    Vitals:    12/18/23 1452 12/18/23 1453   BP: 131/71    Pulse: 64    Weight: 111.3 kg (245 lb 6 oz)    Height: 5' 9" (1.753 m)    PainSc:    5       Body mass index is 36.24 kg/m².    Constitution:   Well-developed, well nourished patient in no acute distress.  Neurological:   Alert and oriented x 3 and cooperative to examination.     Psychiatric/Behavior: Normal mental status.  Respiratory:   No shortness of breath.  Eyes:    Extraoccular muscles intact  Skin:    No scars, rash or suspicious lesions.    MSK:   Right upper extremity:  No open wounds or rashes.  Swelling dorsally over the 1st dorsal compartment at the wrist.  Tenderness to palpation over the 4th dorsal compartment at the wrist.  Tenderness to palpation of the distal radioulnar joint.  Tenderness to palpation over the radiocarpal joint.  Wrist range of motion is 45° of extension, 50° of extension, 60° of supination, 70° of pronation.  Distal radioulnar joint is stable.  He has full passive range of motion of his digits.  He is unable to extend his middle, ring, small fingers at the metacarpophalangeal joints.  He is able to extend at the interphalangeal joints.  He is able to flex at the FDS and FDP can curl his fingers into a fist and touch distal palmar crease.  Sensation light touch intact to median ulnar radial distribution.  Radial pulse 2 +hand is warm well perfused    Imaging:   X-ray of the right wrist three views " shows severe arthritic changes at the distal radioulnar joint with ulnar subluxation of the carpus    MRI of the right wrist without contrast shows extensive synovitis of the 4th dorsal compartment with a ruptures of the extensor digitorum communis of the ring and middle fingers.  There is extensor digiti minimi rupture also.  There was extensive distal radioulnar joint arthritis with a previous distal ulna fracture     Assessment:  Extensor tendon ruptures of right middle, ring, small fingers, extensor tenosynovitis of the 4th dorsal compartment, severe distal radioulnar joint arthritis, possible rheumatoid arthritis    Plan:  Based on his imaging, he has significant pathology in his wrist.  The biggest issue and the only issue that I think that we should address surgically at the moment is his extensor ruptures of his middle, ring, small fingers, he has not symptomatic for his distal radioulnar joint arthritis and his other issues that are present radiographically are not bothering him.  I will take him to the operating room for extensor tendon transfers to the middle, ring, small fingers on 01/12/2024    I explained that surgery and the nature of their condition are not without risks. These include, but are not limited to, bleeding, infection, neurovascular compromise, wound complications, scarring, cosmetic defects, need for later and/or repeated surgeries, pain, loss of ROM, loss of function, deformity, functional abnormalities, stiffness, thromboembolic complications, compartment syndrome, loss of limb, loss of life, anesthetic complications, and other imponderables. I explained that these can occur despite the adequacy of treatments rendered, and that their risks are heightened given the nature of their condition. They verbalized understanding. They would like to continue with surgery at this time. If appropriate family was involved with surgical discussion.      Follow Up:  After surgery  Xray at next visit:   None

## 2023-12-18 NOTE — PROGRESS NOTES
Chief Complaint:  Right wrist and finger pain    Consulting Physician: No ref. provider found    History of present illness:    Patient is a 73-year-old male who presents for follow-up evaluation of his right wrist and finger pain.  He has had wrist pain for the last several months.  For about the last 3 months he has been unable to extend his middle, ring, small fingers.  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  He has a cyst on the dorsum of his wrist which his primary care doctor attempted to aspirate.  Only 1 cc of fluid was aspirated in the cyst remained.  He denies any numbness or tingling into the fingertips.  He has type 2 diabetes, gout and arthritis.  He has never been told that he has rheumatoid arthritis.    I saw him in my clinic last time where her I ordered a MRI to better evaluate for extensor tendon ruptures.  He is here for the results.  He has had no improvement in his symptoms    Past Medical History:   Diagnosis Date    Arthritis     Bulging of lumbar intervertebral disc     CAD (coronary artery disease)     Class 2 severe obesity due to excess calories with serious comorbidity and body mass index (BMI) of 36.0 to 36.9 in adult 7/5/2023    Gout 06/06/2022    Hypertension 06/06/2022    Hypothyroidism 06/06/2022    Personal history of colonic polyps     Sleep apnea 06/06/2022    uses CPAP uses CPAP    Type 2 diabetes mellitus with diabetic polyneuropathy, without long-term current use of insulin 06/06/2022       Past Surgical History:   Procedure Laterality Date    CHOLECYSTECTOMY      COLONOSCOPY  03/17/2015    COLONOSCOPY Bilateral 07/20/2020    CORONARY ANGIOPLASTY WITH STENT PLACEMENT N/A     Excision of Skin Cancer (Arm)  N/A     EYE SURGERY      INJECTION OF ANESTHETIC AGENT AROUND MEDIAL BRANCH NERVES INNERVATING LUMBAR FACET JOINT N/A 10/26/2022    Procedure: Block-nerve-medial branch-lumbar;  Surgeon: Samreen Horne MD;  Location: [facility]  OR;  Service: Pain Management;  Laterality: N/A;  Left L4/L5    KNEE ARTHROSCOPY Left     TRANSFORAMINAL EPIDURAL INJECTION OF STEROID Left 1/5/2023    Procedure: INJECTION, STEROID, EPIDURAL, TRANSFORAMINAL APPROACH Left L5;  Surgeon: Samreen Horne MD;  Location: [facility] OR;  Service: Pain Management;  Laterality: Left;  L5    VASECTOMY N/A        Current Outpatient Medications   Medication Sig    allopurinoL (ZYLOPRIM) 300 MG tablet Take 1 tablet by mouth once daily.    amLODIPine (NORVASC) 10 MG tablet Take 1 tablet by mouth once daily.    amLODIPine (NORVASC) 5 MG tablet Take 5 mg by mouth once daily.    aspirin 81 mg Cap Take 1 tablet by mouth once daily.    diclofenac (VOLTAREN) 75 MG EC tablet Take 1 tablet (75 mg total) by mouth 2 (two) times daily.    fenofibrate 160 MG Tab Take 1 tablet by mouth once daily.    glimepiride (AMARYL) 1 MG tablet Take 1 tablet (1 mg total) by mouth 2 (two) times daily.    glucosamine-chondroitin 250-200 mg Tab Take 1 tablet by mouth Daily.    iron-vit c-b12-folic acid (IRON 100 PLUS) Tab 1 tablet Daily.    levothyroxine (SYNTHROID, LEVOTHROID) 175 MCG tablet Take 1 tablet (175 mcg total) by mouth before breakfast.    lisinopriL 10 MG tablet Take 1 tablet (10 mg total) by mouth 2 (two) times daily.    metFORMIN (GLUCOPHAGE) 500 MG tablet Take 1/2 tablet bid    omeprazole (PRILOSEC) 40 MG capsule Take 1 capsule by mouth daily.    traZODone (DESYREL) 50 MG tablet Take 1 tablet (50 mg total) by mouth every evening.     No current facility-administered medications for this visit.       Review of patient's allergies indicates:  No Known Allergies    Family History   Problem Relation Age of Onset    Heart attack Mother     Lung cancer Father     Cancer Father        Social History     Socioeconomic History    Marital status:    Tobacco Use    Smoking status: Former     Current packs/day: 2.00     Average packs/day: 2.0 packs/day for 35.0 years (70.0 ttl pk-yrs)     Types:  Cigarettes    Smokeless tobacco: Never   Substance and Sexual Activity    Alcohol use: Yes     Alcohol/week: 9.0 standard drinks of alcohol     Types: 2 Glasses of wine, 2 Cans of beer, 3 Shots of liquor, 2 Drinks containing 0.5 oz of alcohol per week     Comment: twice weekly    Drug use: Never    Sexual activity: Yes     Partners: Female     Social Determinants of Health     Financial Resource Strain: Low Risk  (4/27/2023)    Overall Financial Resource Strain (CARDIA)     Difficulty of Paying Living Expenses: Not hard at all   Food Insecurity: No Food Insecurity (4/27/2023)    Hunger Vital Sign     Worried About Running Out of Food in the Last Year: Never true     Ran Out of Food in the Last Year: Never true   Transportation Needs: No Transportation Needs (4/27/2023)    PRAPARE - Transportation     Lack of Transportation (Medical): No     Lack of Transportation (Non-Medical): No   Physical Activity: Sufficiently Active (4/27/2023)    Exercise Vital Sign     Days of Exercise per Week: 3 days     Minutes of Exercise per Session: 50 min   Stress: No Stress Concern Present (4/27/2023)    [place] [employer] - Occupational Stress Questionnaire     Feeling of Stress : Not at all   Social Connections: Unknown (4/27/2023)    Social Connection and Isolation Panel [NHANES]     Frequency of Communication with Friends and Family: More than three times a week     Frequency of Social Gatherings with Friends and Family: More than three times a week     Attends Christian Services: Patient declined     Active Member of Clubs or Organizations: Patient declined     Attends Club or Organization Meetings: Patient declined     Marital Status:    Housing Stability: Low Risk  (4/27/2023)    Housing Stability Vital Sign     Unable to Pay for Housing in the Last Year: No     Number of Places Lived in the Last Year: 1     Unstable Housing in the Last Year: No       Review of Systems:    Constitution:   Denies  "chills, fever, and sweats.  HENT:   Denies headaches or blurry vision.  Cardiovascular:  Denies chest pain or irregular heart beat.  Respiratory:   Denies cough or shortness of breath.  Gastrointestinal:  Denies abdominal pain, nausea, or vomiting.  Musculoskeletal:   Denies muscle cramps.  Neurological:   Denies dizziness or focal weakness.  Psychiatric/Behavior: Normal mental status.  Hematology/Lymph:  Denies bleeding problem or easy bruising/bleeding.  Skin:    Denies rash or suspicious lesions.    Examination:    Vital Signs:    Vitals:    12/18/23 1452 12/18/23 1453   BP: 131/71    Pulse: 64    Weight: 111.3 kg (245 lb 6 oz)    Height: 5' 9" (1.753 m)    PainSc:    5       Body mass index is 36.24 kg/m².    Constitution:   Well-developed, well nourished patient in no acute distress.  Neurological:   Alert and oriented x 3 and cooperative to examination.     Psychiatric/Behavior: Normal mental status.  Respiratory:   No shortness of breath.  Eyes:    Extraoccular muscles intact  Skin:    No scars, rash or suspicious lesions.    MSK:   Right upper extremity:  No open wounds or rashes.  Swelling dorsally over the 1st dorsal compartment at the wrist.  Tenderness to palpation over the 4th dorsal compartment at the wrist.  Tenderness to palpation of the distal radioulnar joint.  Tenderness to palpation over the radiocarpal joint.  Wrist range of motion is 45° of extension, 50° of extension, 60° of supination, 70° of pronation.  Distal radioulnar joint is stable.  He has full passive range of motion of his digits.  He is unable to extend his middle, ring, small fingers at the metacarpophalangeal joints.  He is able to extend at the interphalangeal joints.  He is able to flex at the FDS and FDP can curl his fingers into a fist and touch distal palmar crease.  Sensation light touch intact to median ulnar radial distribution.  Radial pulse 2 +hand is warm well perfused    Imaging:   X-ray of the right wrist three views " shows severe arthritic changes at the distal radioulnar joint with ulnar subluxation of the carpus    MRI of the right wrist without contrast shows extensive synovitis of the 4th dorsal compartment with a ruptures of the extensor digitorum communis of the ring and middle fingers.  There is extensor digiti minimi rupture also.  There was extensive distal radioulnar joint arthritis with a previous distal ulna fracture     Assessment:  Extensor tendon ruptures of right middle, ring, small fingers, extensor tenosynovitis of the 4th dorsal compartment, severe distal radioulnar joint arthritis, possible rheumatoid arthritis    Plan:  Based on his imaging, he has significant pathology in his wrist.  The biggest issue and the only issue that I think that we should address surgically at the moment is his extensor ruptures of his middle, ring, small fingers, he has not symptomatic for his distal radioulnar joint arthritis and his other issues that are present radiographically are not bothering him.  I will take him to the operating room for extensor tendon transfers to the middle, ring, small fingers on 01/12/2024    I explained that surgery and the nature of their condition are not without risks. These include, but are not limited to, bleeding, infection, neurovascular compromise, wound complications, scarring, cosmetic defects, need for later and/or repeated surgeries, pain, loss of ROM, loss of function, deformity, functional abnormalities, stiffness, thromboembolic complications, compartment syndrome, loss of limb, loss of life, anesthetic complications, and other imponderables. I explained that these can occur despite the adequacy of treatments rendered, and that their risks are heightened given the nature of their condition. They verbalized understanding. They would like to continue with surgery at this time. If appropriate family was involved with surgical discussion.      Follow Up:  After surgery  Xray at next visit:   None

## 2023-12-19 ENCOUNTER — TELEPHONE (OUTPATIENT)
Dept: ORTHOPEDICS | Facility: CLINIC | Age: 73
End: 2023-12-19
Payer: MEDICARE

## 2023-12-19 NOTE — LETTER
Patients Name: Darwin Fletcher  : 1950  Today's Date: 23     Dr. Vega ,          The above named patient is scheduled to have a right hand extensor tendon transfers to middle, ring, small fingers on 24.    Surgeon: Dr. Javy Price  Type of Anesthesia: General    This patient needs surgical clearance from your office for this procedure.  Please perform necessary tests in order for this patient to be medically cleared for surgery. Please send or fax the clearance letter with most recent ECHO, EKG or stress test, to our office as soon as possible.     Please include any medication instructions that should be held prior to surgery.     We appreciate your help in getting our patient cleared for surgery.    Please feel free to call our office should you have any questions.     Thank you,   Javy Price MD/HRL      Phone Number: [phone]  Fax Number: [phone]

## 2023-12-19 NOTE — TELEPHONE ENCOUNTER
I faxed the surgery clearance request.     Provider: Dr. Vega  Sx Date: 1/12/24  Date Faxed: 12/19/23  F #:   [phone]

## 2023-12-27 ENCOUNTER — HOSPITAL ENCOUNTER (OUTPATIENT)
Dept: RADIOLOGY | Facility: HOSPITAL | Age: 73
Discharge: HOME | End: 2023-12-27
Attending: STUDENT IN AN ORGANIZED HEALTH CARE EDUCATION/TRAINING PROGRAM
Payer: MEDICARE

## 2023-12-27 DIAGNOSIS — M66.241 EXTENSOR TENDON RUPTURE, NON-TRAUMATIC, HAND AND WRIST, RIGHT: ICD-10-CM

## 2023-12-27 DIAGNOSIS — M66.231 EXTENSOR TENDON RUPTURE, NON-TRAUMATIC, HAND AND WRIST, RIGHT: ICD-10-CM

## 2023-12-27 DIAGNOSIS — G47.00 INSOMNIA, UNSPECIFIED TYPE: ICD-10-CM

## 2023-12-27 DIAGNOSIS — Z01.818 PREOP EXAMINATION: ICD-10-CM

## 2023-12-27 PROCEDURE — 71046 X-RAY EXAM CHEST 2 VIEWS: CPT | Mod: TC

## 2023-12-27 RX ORDER — TRAZODONE HYDROCHLORIDE 50 MG/1
50 TABLET ORAL NIGHTLY
Qty: 90 TABLET | Refills: 3 | Status: SHIPPED | OUTPATIENT
Start: 2023-12-27

## 2023-12-29 NOTE — TELEPHONE ENCOUNTER
Received surgery clearance.     Cardiologist::  Dr. Vega   Risk: moderate     Medication Instructions:   Ok to hold Aspirin - 5-7 days prior to procedure.     - Per clearance - please note that patient is not on daily maintenance beta blockers because of bradycardia.   - States given that this is hand surgery, in case this can be done with regional block and MAC that can decrease risk further, but if that can not be done, ok to proceed with GA/surgery if needed.

## 2024-01-02 DIAGNOSIS — I10 HYPERTENSION, UNSPECIFIED TYPE: ICD-10-CM

## 2024-01-03 RX ORDER — LISINOPRIL 10 MG/1
10 TABLET ORAL DAILY
Qty: 90 TABLET | Refills: 3 | Status: SHIPPED | OUTPATIENT
Start: 2024-01-03

## 2024-01-09 ENCOUNTER — PATIENT MESSAGE (OUTPATIENT)
Dept: ADMINISTRATIVE | Facility: OTHER | Age: 74
End: 2024-01-09
Payer: MEDICARE

## 2024-01-10 ENCOUNTER — PATIENT MESSAGE (OUTPATIENT)
Dept: ADMINISTRATIVE | Facility: OTHER | Age: 74
End: 2024-01-10
Payer: MEDICARE

## 2024-01-11 ENCOUNTER — PATIENT MESSAGE (OUTPATIENT)
Dept: ADMINISTRATIVE | Facility: OTHER | Age: 74
End: 2024-01-11
Payer: MEDICARE

## 2024-01-12 ENCOUNTER — HOSPITAL ENCOUNTER (OUTPATIENT)
Facility: HOSPITAL | Age: 74
Discharge: HOME | End: 2024-01-12
Attending: STUDENT IN AN ORGANIZED HEALTH CARE EDUCATION/TRAINING PROGRAM | Admitting: STUDENT IN AN ORGANIZED HEALTH CARE EDUCATION/TRAINING PROGRAM
Payer: MEDICARE

## 2024-01-12 ENCOUNTER — TELEPHONE (OUTPATIENT)
Dept: ORTHOPEDICS | Facility: CLINIC | Age: 74
End: 2024-01-12

## 2024-01-12 VITALS
HEIGHT: 69 IN | WEIGHT: 240.94 LBS | OXYGEN SATURATION: 95 % | RESPIRATION RATE: 20 BRPM | HEART RATE: 63 BPM | DIASTOLIC BLOOD PRESSURE: 89 MMHG | SYSTOLIC BLOOD PRESSURE: 162 MMHG | BODY MASS INDEX: 35.69 KG/M2 | TEMPERATURE: 97 F

## 2024-01-12 DIAGNOSIS — S66.811A EXTENSOR TENDON RUPTURE OF HAND, RIGHT, INITIAL ENCOUNTER: ICD-10-CM

## 2024-01-12 DIAGNOSIS — Z01.818 PREOP EXAMINATION: ICD-10-CM

## 2024-01-12 DIAGNOSIS — M66.231 EXTENSOR TENDON RUPTURE, NON-TRAUMATIC, HAND AND WRIST, RIGHT: Primary | ICD-10-CM

## 2024-01-12 DIAGNOSIS — M19.90 ARTHRITIS: Primary | Chronic | ICD-10-CM

## 2024-01-12 DIAGNOSIS — M66.241 EXTENSOR TENDON RUPTURE, NON-TRAUMATIC, HAND AND WRIST, RIGHT: Primary | ICD-10-CM

## 2024-01-12 PROCEDURE — 99499 UNLISTED E&M SERVICE: CPT | Mod: ,,, | Performed by: STUDENT IN AN ORGANIZED HEALTH CARE EDUCATION/TRAINING PROGRAM

## 2024-01-12 PROCEDURE — 82962 GLUCOSE BLOOD TEST: CPT | Performed by: STUDENT IN AN ORGANIZED HEALTH CARE EDUCATION/TRAINING PROGRAM

## 2024-01-12 RX ORDER — SODIUM CHLORIDE 9 MG/ML
INJECTION, SOLUTION INTRAVENOUS CONTINUOUS
Status: DISCONTINUED | OUTPATIENT
Start: 2024-01-12 | End: 2024-01-12 | Stop reason: HOSPADM

## 2024-01-12 RX ORDER — METHOCARBAMOL 500 MG/1
500 TABLET, FILM COATED ORAL EVERY 6 HOURS PRN
Status: CANCELLED | OUTPATIENT
Start: 2024-01-12

## 2024-01-12 RX ORDER — MORPHINE SULFATE 4 MG/ML
4 INJECTION, SOLUTION INTRAMUSCULAR; INTRAVENOUS
Status: CANCELLED | OUTPATIENT
Start: 2024-01-12

## 2024-01-12 RX ORDER — ONDANSETRON HYDROCHLORIDE 2 MG/ML
4 INJECTION, SOLUTION INTRAVENOUS EVERY 6 HOURS PRN
Status: CANCELLED | OUTPATIENT
Start: 2024-01-12

## 2024-01-12 RX ORDER — METOCLOPRAMIDE HYDROCHLORIDE 5 MG/ML
10 INJECTION INTRAMUSCULAR; INTRAVENOUS EVERY 6 HOURS PRN
Status: CANCELLED | OUTPATIENT
Start: 2024-01-12

## 2024-01-12 RX ORDER — HYDROCODONE BITARTRATE AND ACETAMINOPHEN 5; 325 MG/1; MG/1
1 TABLET ORAL EVERY 4 HOURS PRN
Status: CANCELLED | OUTPATIENT
Start: 2024-01-12

## 2024-01-12 RX ORDER — SODIUM CHLORIDE 9 MG/ML
INJECTION, SOLUTION INTRAVENOUS CONTINUOUS
Status: CANCELLED | OUTPATIENT
Start: 2024-01-12

## 2024-01-12 NOTE — PROGRESS NOTES
Patient was scheduled to have tendon transfers performed however when I examined him in the preoperative area it was noted that his index finger tendons had also ruptured.  This has a new finding compared from his previous clinic appointment.  Now that his index finger tendons have also ruptured, he would need a different procedure.  I would now need to take tendons from the volar side of the hand and transferred him to the dorsal side of the hand.  This has a much more extensive operation.  He would like to think about this so I will see him in clinic next week where I will discuss this procedure in detail and then re book him for surgery.  We did not do surgery today

## 2024-01-12 NOTE — TELEPHONE ENCOUNTER
Per Dr. Price I entered a referral for rheumatology.   Per Dr. Price patient needs to follow up on 1/22/24 or 1/24/24. I attempted the call the patient - no answer - LVM

## 2024-01-12 NOTE — OR NURSING
MD cancelled surgery, due to pt's change in hand status. Originally MD planned surgery for 3 non- working fingers but now pt reports 4 fingers are not working. MD explained to pt that surgery now will be more involved  and recovery will take longer than originally planned. Pt and daughter states they want to think more on change of surgery plan and would like to see RA before deciding on surgery. MD states he will have Sheeba reschedule pt to come in office and also send a referral to RA.

## 2024-01-12 NOTE — DISCHARGE SUMMARY
[facility]  Discharge Note  Short Stay    Patient was scheduled to have tendon transfers performed however when I examined him in the preoperative area it was noted that his index finger tendons had also ruptured.  This has a new finding compared from his previous clinic appointment.  Now that his index finger tendons have also ruptured, he would need a different procedure.  I would now need to take tendons from the volar side of the hand and transferred him to the dorsal side of the hand.  This has a much more extensive operation.  He would like to think about this so I will see him in clinic next week where I will discuss this procedure in detail and then re book him for surgery.  We did not do surgery today    TIME SPENT ON DISCHARGE: 5 minutes

## 2024-01-15 ENCOUNTER — PATIENT MESSAGE (OUTPATIENT)
Dept: INTERNAL MEDICINE | Facility: CLINIC | Age: 74
End: 2024-01-15
Payer: MEDICARE

## 2024-01-15 DIAGNOSIS — M19.90 ARTHRITIS: ICD-10-CM

## 2024-01-15 DIAGNOSIS — R20.8 DECREASED SENSATION OF HAND AND ARM: Primary | ICD-10-CM

## 2024-01-15 NOTE — LETTER
Dear Dr. Adan,    Recently, we faxed a referral to your office on behalf of Darwin Fletcher with the date of birth 1950. Unfortunately, we have not received an update on this patient. Therefore, we request the following information (if applicable) faxed to our office:  Appointment Date & Time  Office Visit note  Pending MD review  Any failed attempts to schedule    Our office fax number is [phone]. If you have any questions, please do not hesitate to contact our office at [phone].        With best regards,    Vanessa Hong MD  [facility]  [address] [place] [id number]  Dept: [phone]  Dept Fax: [phone]

## 2024-01-24 ENCOUNTER — OFFICE VISIT (OUTPATIENT)
Dept: ORTHOPEDICS | Facility: CLINIC | Age: 74
End: 2024-01-24
Payer: MEDICARE

## 2024-01-24 VITALS
HEIGHT: 69 IN | WEIGHT: 243 LBS | SYSTOLIC BLOOD PRESSURE: 147 MMHG | HEART RATE: 65 BPM | DIASTOLIC BLOOD PRESSURE: 77 MMHG | BODY MASS INDEX: 35.99 KG/M2

## 2024-01-24 DIAGNOSIS — M66.231 EXTENSOR TENDON RUPTURE, NON-TRAUMATIC, HAND AND WRIST, RIGHT: Primary | ICD-10-CM

## 2024-01-24 DIAGNOSIS — S66.811A EXTENSOR TENDON RUPTURE OF HAND, RIGHT, INITIAL ENCOUNTER: ICD-10-CM

## 2024-01-24 DIAGNOSIS — M66.241 EXTENSOR TENDON RUPTURE, NON-TRAUMATIC, HAND AND WRIST, RIGHT: Primary | ICD-10-CM

## 2024-01-24 PROCEDURE — 99214 OFFICE O/P EST MOD 30 MIN: CPT | Mod: ,,, | Performed by: STUDENT IN AN ORGANIZED HEALTH CARE EDUCATION/TRAINING PROGRAM

## 2024-01-24 RX ORDER — SODIUM CHLORIDE 9 MG/ML
INJECTION, SOLUTION INTRAVENOUS CONTINUOUS
Status: CANCELLED | OUTPATIENT
Start: 2024-01-24

## 2024-01-30 NOTE — PROGRESS NOTES
Chief Complaint:  Right wrist and finger pain    Consulting Physician: No ref. provider found    History of present illness:    Patient is a 73-year-old male who presents for follow-up evaluation of his right wrist and finger pain.  I had scheduled him for surgery where I was going to transfer the extensor indices proprius to the ring and small fingers in the extensor digitorum communis to the middle finger to the extensor digitorum communis to the index finger in an end-to-side fashion however when he showed up for surgery the index finger extensor tendons had also ruptured.  He wanted to think about surgery and discuss this further in clinic so I rescheduled him.  He is here today unable to extend the index, middle, ring, small fingers.  He is concerned about this happening on the other side and tried to get into Rheumatology however they do not have an appointment for him until 2025.  He is apprehensive about having surgery because he was really worried about this happening on the contralateral side    Past Medical History:   Diagnosis Date    Arthritis     Bulging of lumbar intervertebral disc     CAD (coronary artery disease)     Class 2 severe obesity due to excess calories with serious comorbidity and body mass index (BMI) of 36.0 to 36.9 in adult 07/05/2023    Gout 06/06/2022    Hypertension 06/06/2022    Hypothyroidism 06/06/2022    Personal history of colonic polyps     Sleep apnea 06/06/2022    CPAP    Type 2 diabetes mellitus with diabetic polyneuropathy, without long-term current use of insulin 06/06/2022       Past Surgical History:   Procedure Laterality Date    CHOLECYSTECTOMY      COLONOSCOPY  03/17/2015    COLONOSCOPY Bilateral 07/20/2020    CORONARY ANGIOPLASTY WITH STENT PLACEMENT N/A     Excision of Skin Cancer (Arm)  N/A     EYE SURGERY      INJECTION OF ANESTHETIC AGENT AROUND MEDIAL BRANCH NERVES INNERVATING LUMBAR FACET JOINT N/A 10/26/2022    Procedure: Block-nerve-medial branch-lumbar;   Surgeon: Samreen Horen MD;  Location: [facility] OR;  Service: Pain Management;  Laterality: N/A;  Left L4/L5    KNEE ARTHROSCOPY Left     TRANSFORAMINAL EPIDURAL INJECTION OF STEROID Left 1/5/2023    Procedure: INJECTION, STEROID, EPIDURAL, TRANSFORAMINAL APPROACH Left L5;  Surgeon: Samreen Horne MD;  Location: [facility] OR;  Service: Pain Management;  Laterality: Left;  L5    VASECTOMY N/A        Current Outpatient Medications   Medication Sig    allopurinoL (ZYLOPRIM) 300 MG tablet Take 1 tablet by mouth once daily. (Patient taking differently: Take 300 mg by mouth once daily.)    amLODIPine (NORVASC) 10 MG tablet Take 1 tablet by mouth once daily. (Patient taking differently: Take 5 mg by mouth once daily.)    amLODIPine (NORVASC) 5 MG tablet Take 5 mg by mouth once daily.    aspirin 81 mg Cap Take 1 tablet by mouth 2 (two) times a day.    diclofenac (VOLTAREN) 75 MG EC tablet Take 1 tablet (75 mg total) by mouth 2 (two) times daily. (Patient taking differently: Take 75 mg by mouth Daily.)    fenofibrate 160 MG Tab Take 1 tablet by mouth once daily.    glimepiride (AMARYL) 1 MG tablet Take 1 tablet (1 mg total) by mouth 2 (two) times daily.    glucosamine-chondroitin 250-200 mg Tab Take 1 tablet by mouth Daily.    iron-vit c-b12-folic acid (IRON 100 PLUS) Tab 1 tablet Daily.    levothyroxine (SYNTHROID, LEVOTHROID) 175 MCG tablet Take 1 tablet (175 mcg total) by mouth before breakfast.    lisinopriL 10 MG tablet Take 1 tablet (10 mg total) by mouth once daily.    metFORMIN (GLUCOPHAGE) 500 MG tablet Take 1/2 tablet bid (Patient taking differently: Take 250 mg by mouth 2 (two) times daily with meals. Take 1/2 tablet bid)    omeprazole (PRILOSEC) 40 MG capsule Take 1 capsule by mouth daily.    traZODone (DESYREL) 50 MG tablet Take 1 tablet by mouth every evening.     No current facility-administered medications for this visit.       Review of patient's allergies indicates:  No Known Allergies    Family History    Problem Relation Age of Onset    Heart attack Mother     Lung cancer Father     Cancer Father        Social History     Socioeconomic History    Marital status:    Tobacco Use    Smoking status: Former     Current packs/day: 2.00     Average packs/day: 2.0 packs/day for 35.0 years (70.0 ttl pk-yrs)     Types: Cigarettes    Smokeless tobacco: Never   Substance and Sexual Activity    Alcohol use: Yes     Alcohol/week: 9.0 standard drinks of alcohol     Types: 2 Glasses of wine, 2 Cans of beer, 3 Shots of liquor, 2 Drinks containing 0.5 oz of alcohol per week     Comment: twice weekly    Drug use: Never    Sexual activity: Yes     Partners: Female     Social Determinants of Health     Financial Resource Strain: Low Risk  (4/27/2023)    Overall Financial Resource Strain (CARDIA)     Difficulty of Paying Living Expenses: Not hard at all   Food Insecurity: No Food Insecurity (4/27/2023)    Hunger Vital Sign     Worried About Running Out of Food in the Last Year: Never true     Ran Out of Food in the Last Year: Never true   Transportation Needs: No Transportation Needs (4/27/2023)    PRAPARE - Transportation     Lack of Transportation (Medical): No     Lack of Transportation (Non-Medical): No   Physical Activity: Sufficiently Active (4/27/2023)    Exercise Vital Sign     Days of Exercise per Week: 3 days     Minutes of Exercise per Session: 50 min   Stress: No Stress Concern Present (4/27/2023)    [employer] [place] [employer] - Occupational Stress Questionnaire     Feeling of Stress : Not at all   Social Connections: Unknown (4/27/2023)    Social Connection and Isolation Panel [NHANES]     Frequency of Communication with Friends and Family: More than three times a week     Frequency of Social Gatherings with Friends and Family: More than three times a week     Attends [employer] Services: Patient declined     Active Member of Clubs or Organizations: Patient declined     Attends Club or Organization  "Meetings: Patient declined     Marital Status:    Housing Stability: Low Risk  (4/27/2023)    Housing Stability Vital Sign     Unable to Pay for Housing in the Last Year: No     Number of Places Lived in the Last Year: 1     Unstable Housing in the Last Year: No       Review of Systems:    Constitution:   Denies chills, fever, and sweats.  HENT:   Denies headaches or blurry vision.  Cardiovascular:  Denies chest pain or irregular heart beat.  Respiratory:   Denies cough or shortness of breath.  Gastrointestinal:  Denies abdominal pain, nausea, or vomiting.  Musculoskeletal:   Denies muscle cramps.  Neurological:   Denies dizziness or focal weakness.  Psychiatric/Behavior: Normal mental status.  Hematology/Lymph:  Denies bleeding problem or easy bruising/bleeding.  Skin:    Denies rash or suspicious lesions.    Examination:    Vital Signs:    Vitals:    01/24/24 1526   BP: (!) 147/77   Pulse: 65   Weight: 110.2 kg (243 lb)   Height: 5' 9" (1.753 m)   PainSc:   8       Body mass index is 35.88 kg/m².    Constitution:   Well-developed, well nourished patient in no acute distress.  Neurological:   Alert and oriented x 3 and cooperative to examination.     Psychiatric/Behavior: Normal mental status.  Respiratory:   No shortness of breath.  Eyes:    Extraoccular muscles intact  Skin:    No scars, rash or suspicious lesions.    MSK:   Right upper extremity:  No open wounds or rashes.  Swelling dorsally over the 1st dorsal compartment at the wrist.  Tenderness to palpation over the 4th dorsal compartment at the wrist.  Tenderness to palpation of the distal radioulnar joint.  Tenderness to palpation over the radiocarpal joint.  Wrist range of motion is 45° of extension, 50° of extension, 60° of supination, 70° of pronation.  Distal radioulnar joint is stable.  He has full passive range of motion of his digits.  He is unable to extend his index, middle, ring, small fingers at the metacarpophalangeal joints.  He is able " to extend at the interphalangeal joints.  He is able to flex at the FDS and FDP can curl his fingers into a fist and touch distal palmar crease.  Sensation light touch intact to median ulnar radial distribution.  Radial pulse 2 +hand is warm well perfused    Imaging:   X-ray of the right wrist three views shows severe arthritic changes at the distal radioulnar joint with ulnar subluxation of the carpus    MRI of the right wrist without contrast shows extensive synovitis of the 4th dorsal compartment with a ruptures of the extensor digitorum communis of the ring and middle fingers.  There is extensor digiti minimi rupture also.  There was extensive distal radioulnar joint arthritis with a previous distal ulna fracture     Assessment:  Extensor tendon ruptures of right index, middle, ring, small fingers, extensor tenosynovitis of the 4th dorsal compartment, severe distal radioulnar joint arthritis, possible rheumatoid arthritis    Plan:  Now that his index finger tendons have also ruptured I think the correct procedures to take the FDS tendon from the middle and ring fingers and reroute them to the dorsum of the wrist to transfer to the index and middle, ring and small fingers respectively.  I explained that the results after this operation can be unpredictable but it is the best treatment option.  Nonoperative treatment is not indicated since his fingers are unable to extend out of his palm.  He is very concerned about the contralateral side.  I have already put in a referral to Rheumatology.  I will book him for tendon transfer surgery on 02/23/2024    I explained that surgery and the nature of their condition are not without risks. These include, but are not limited to, bleeding, infection, neurovascular compromise, wound complications, scarring, cosmetic defects, need for later and/or repeated surgeries, pain, loss of ROM, loss of function, deformity, functional abnormalities, stiffness, thromboembolic  complications, compartment syndrome, loss of limb, loss of life, anesthetic complications, and other imponderables. I explained that these can occur despite the adequacy of treatments rendered, and that their risks are heightened given the nature of their condition. They verbalized understanding. They would like to continue with surgery at this time. If appropriate family was involved with surgical discussion.      Follow Up:  After surgery  Xray at next visit:  None

## 2024-01-30 NOTE — H&P (VIEW-ONLY)
Chief Complaint:  Right wrist and finger pain    Consulting Physician: No ref. provider found    History of present illness:    Patient is a 73-year-old male who presents for follow-up evaluation of his right wrist and finger pain.  I had scheduled him for surgery where I was going to transfer the extensor indices proprius to the ring and small fingers in the extensor digitorum communis to the middle finger to the extensor digitorum communis to the index finger in an end-to-side fashion however when he showed up for surgery the index finger extensor tendons had also ruptured.  He wanted to think about surgery and discuss this further in clinic so I rescheduled him.  He is here today unable to extend the index, middle, ring, small fingers.  He is concerned about this happening on the other side and tried to get into Rheumatology however they do not have an appointment for him until 2025.  He is apprehensive about having surgery because he was really worried about this happening on the contralateral side    Past Medical History:   Diagnosis Date    Arthritis     Bulging of lumbar intervertebral disc     CAD (coronary artery disease)     Class 2 severe obesity due to excess calories with serious comorbidity and body mass index (BMI) of 36.0 to 36.9 in adult 07/05/2023    Gout 06/06/2022    Hypertension 06/06/2022    Hypothyroidism 06/06/2022    Personal history of colonic polyps     Sleep apnea 06/06/2022    CPAP    Type 2 diabetes mellitus with diabetic polyneuropathy, without long-term current use of insulin 06/06/2022       Past Surgical History:   Procedure Laterality Date    CHOLECYSTECTOMY      COLONOSCOPY  03/17/2015    COLONOSCOPY Bilateral 07/20/2020    CORONARY ANGIOPLASTY WITH STENT PLACEMENT N/A     Excision of Skin Cancer (Arm)  N/A     EYE SURGERY      INJECTION OF ANESTHETIC AGENT AROUND MEDIAL BRANCH NERVES INNERVATING LUMBAR FACET JOINT N/A 10/26/2022    Procedure: Block-nerve-medial branch-lumbar;   Surgeon: Samreen Horne MD;  Location: [facility] OR;  Service: Pain Management;  Laterality: N/A;  Left L4/L5    KNEE ARTHROSCOPY Left     TRANSFORAMINAL EPIDURAL INJECTION OF STEROID Left 1/5/2023    Procedure: INJECTION, STEROID, EPIDURAL, TRANSFORAMINAL APPROACH Left L5;  Surgeon: Samreen Horne MD;  Location: [facility] OR;  Service: Pain Management;  Laterality: Left;  L5    VASECTOMY N/A        Current Outpatient Medications   Medication Sig    allopurinoL (ZYLOPRIM) 300 MG tablet Take 1 tablet by mouth once daily. (Patient taking differently: Take 300 mg by mouth once daily.)    amLODIPine (NORVASC) 10 MG tablet Take 1 tablet by mouth once daily. (Patient taking differently: Take 5 mg by mouth once daily.)    amLODIPine (NORVASC) 5 MG tablet Take 5 mg by mouth once daily.    aspirin 81 mg Cap Take 1 tablet by mouth 2 (two) times a day.    diclofenac (VOLTAREN) 75 MG EC tablet Take 1 tablet (75 mg total) by mouth 2 (two) times daily. (Patient taking differently: Take 75 mg by mouth Daily.)    fenofibrate 160 MG Tab Take 1 tablet by mouth once daily.    glimepiride (AMARYL) 1 MG tablet Take 1 tablet (1 mg total) by mouth 2 (two) times daily.    glucosamine-chondroitin 250-200 mg Tab Take 1 tablet by mouth Daily.    iron-vit c-b12-folic acid (IRON 100 PLUS) Tab 1 tablet Daily.    levothyroxine (SYNTHROID, LEVOTHROID) 175 MCG tablet Take 1 tablet (175 mcg total) by mouth before breakfast.    lisinopriL 10 MG tablet Take 1 tablet (10 mg total) by mouth once daily.    metFORMIN (GLUCOPHAGE) 500 MG tablet Take 1/2 tablet bid (Patient taking differently: Take 250 mg by mouth 2 (two) times daily with meals. Take 1/2 tablet bid)    omeprazole (PRILOSEC) 40 MG capsule Take 1 capsule by mouth daily.    traZODone (DESYREL) 50 MG tablet Take 1 tablet by mouth every evening.     No current facility-administered medications for this visit.       Review of patient's allergies indicates:  No Known Allergies    Family History    Problem Relation Age of Onset    Heart attack Mother     Lung cancer Father     Cancer Father        Social History     Socioeconomic History    Marital status:    Tobacco Use    Smoking status: Former     Current packs/day: 2.00     Average packs/day: 2.0 packs/day for 35.0 years (70.0 ttl pk-yrs)     Types: Cigarettes    Smokeless tobacco: Never   Substance and Sexual Activity    Alcohol use: Yes     Alcohol/week: 9.0 standard drinks of alcohol     Types: 2 Glasses of wine, 2 Cans of beer, 3 Shots of liquor, 2 Drinks containing 0.5 oz of alcohol per week     Comment: twice weekly    Drug use: Never    Sexual activity: Yes     Partners: Female     Social Determinants of Health     Financial Resource Strain: Low Risk  (4/27/2023)    Overall Financial Resource Strain (CARDIA)     Difficulty of Paying Living Expenses: Not hard at all   Food Insecurity: No Food Insecurity (4/27/2023)    Hunger Vital Sign     Worried About Running Out of Food in the Last Year: Never true     Ran Out of Food in the Last Year: Never true   Transportation Needs: No Transportation Needs (4/27/2023)    PRAPARE - Transportation     Lack of Transportation (Medical): No     Lack of Transportation (Non-Medical): No   Physical Activity: Sufficiently Active (4/27/2023)    Exercise Vital Sign     Days of Exercise per Week: 3 days     Minutes of Exercise per Session: 50 min   Stress: No Stress Concern Present (4/27/2023)    [employer] [place] [employer] - Occupational Stress Questionnaire     Feeling of Stress : Not at all   Social Connections: Unknown (4/27/2023)    Social Connection and Isolation Panel [NHANES]     Frequency of Communication with Friends and Family: More than three times a week     Frequency of Social Gatherings with Friends and Family: More than three times a week     Attends Sikhism Services: Patient declined     Active Member of Clubs or Organizations: Patient declined     Attends Club or Organization  "Meetings: Patient declined     Marital Status:    Housing Stability: Low Risk  (4/27/2023)    Housing Stability Vital Sign     Unable to Pay for Housing in the Last Year: No     Number of Places Lived in the Last Year: 1     Unstable Housing in the Last Year: No       Review of Systems:    Constitution:   Denies chills, fever, and sweats.  HENT:   Denies headaches or blurry vision.  Cardiovascular:  Denies chest pain or irregular heart beat.  Respiratory:   Denies cough or shortness of breath.  Gastrointestinal:  Denies abdominal pain, nausea, or vomiting.  Musculoskeletal:   Denies muscle cramps.  Neurological:   Denies dizziness or focal weakness.  Psychiatric/Behavior: Normal mental status.  Hematology/Lymph:  Denies bleeding problem or easy bruising/bleeding.  Skin:    Denies rash or suspicious lesions.    Examination:    Vital Signs:    Vitals:    01/24/24 1526   BP: (!) 147/77   Pulse: 65   Weight: 110.2 kg (243 lb)   Height: 5' 9" (1.753 m)   PainSc:   8       Body mass index is 35.88 kg/m².    Constitution:   Well-developed, well nourished patient in no acute distress.  Neurological:   Alert and oriented x 3 and cooperative to examination.     Psychiatric/Behavior: Normal mental status.  Respiratory:   No shortness of breath.  Eyes:    Extraoccular muscles intact  Skin:    No scars, rash or suspicious lesions.    MSK:   Right upper extremity:  No open wounds or rashes.  Swelling dorsally over the 1st dorsal compartment at the wrist.  Tenderness to palpation over the 4th dorsal compartment at the wrist.  Tenderness to palpation of the distal radioulnar joint.  Tenderness to palpation over the radiocarpal joint.  Wrist range of motion is 45° of extension, 50° of extension, 60° of supination, 70° of pronation.  Distal radioulnar joint is stable.  He has full passive range of motion of his digits.  He is unable to extend his index, middle, ring, small fingers at the metacarpophalangeal joints.  He is able " to extend at the interphalangeal joints.  He is able to flex at the FDS and FDP can curl his fingers into a fist and touch distal palmar crease.  Sensation light touch intact to median ulnar radial distribution.  Radial pulse 2 +hand is warm well perfused    Imaging:   X-ray of the right wrist three views shows severe arthritic changes at the distal radioulnar joint with ulnar subluxation of the carpus    MRI of the right wrist without contrast shows extensive synovitis of the 4th dorsal compartment with a ruptures of the extensor digitorum communis of the ring and middle fingers.  There is extensor digiti minimi rupture also.  There was extensive distal radioulnar joint arthritis with a previous distal ulna fracture     Assessment:  Extensor tendon ruptures of right index, middle, ring, small fingers, extensor tenosynovitis of the 4th dorsal compartment, severe distal radioulnar joint arthritis, possible rheumatoid arthritis    Plan:  Now that his index finger tendons have also ruptured I think the correct procedures to take the FDS tendon from the middle and ring fingers and reroute them to the dorsum of the wrist to transfer to the index and middle, ring and small fingers respectively.  I explained that the results after this operation can be unpredictable but it is the best treatment option.  Nonoperative treatment is not indicated since his fingers are unable to extend out of his palm.  He is very concerned about the contralateral side.  I have already put in a referral to Rheumatology.  I will book him for tendon transfer surgery on 02/23/2024    I explained that surgery and the nature of their condition are not without risks. These include, but are not limited to, bleeding, infection, neurovascular compromise, wound complications, scarring, cosmetic defects, need for later and/or repeated surgeries, pain, loss of ROM, loss of function, deformity, functional abnormalities, stiffness, thromboembolic  complications, compartment syndrome, loss of limb, loss of life, anesthetic complications, and other imponderables. I explained that these can occur despite the adequacy of treatments rendered, and that their risks are heightened given the nature of their condition. They verbalized understanding. They would like to continue with surgery at this time. If appropriate family was involved with surgical discussion.      Follow Up:  After surgery  Xray at next visit:  None

## 2024-01-31 ENCOUNTER — LAB VISIT (OUTPATIENT)
Dept: LAB | Facility: HOSPITAL | Age: 74
End: 2024-01-31
Payer: MEDICARE

## 2024-01-31 DIAGNOSIS — E03.9 HYPOTHYROIDISM, UNSPECIFIED TYPE: Chronic | ICD-10-CM

## 2024-01-31 DIAGNOSIS — E11.42 TYPE 2 DIABETES MELLITUS WITH DIABETIC POLYNEUROPATHY, WITHOUT LONG-TERM CURRENT USE OF INSULIN: Chronic | ICD-10-CM

## 2024-01-31 DIAGNOSIS — D50.8 IRON DEFICIENCY ANEMIA SECONDARY TO INADEQUATE DIETARY IRON INTAKE: ICD-10-CM

## 2024-01-31 LAB
EST. AVERAGE GLUCOSE BLD GHB EST-MCNC: 102.5 MG/DL
FERRITIN SERPL-MCNC: 330.23 NG/ML (ref 21.81–274.66)
HBA1C MFR BLD: 5.2 %
IRON SATN MFR SERPL: 32 % (ref 20–50)
IRON SERPL-MCNC: 92 UG/DL (ref 65–175)
RET# (OHS): 0.09 X10E6/UL (ref 0.03–0.1)
RETICULOCYTE COUNT AUTOMATED (OLG): 2.25 % (ref 1.1–2.1)
TIBC SERPL-MCNC: 200 UG/DL (ref 69–240)
TIBC SERPL-MCNC: 292 UG/DL (ref 250–450)
TRANSFERRIN SERPL-MCNC: 271 MG/DL (ref 163–344)
TSH SERPL-ACNC: 1.75 UIU/ML (ref 0.35–4.94)
VIT B12 SERPL-MCNC: 311 PG/ML (ref 213–816)

## 2024-01-31 PROCEDURE — 85045 AUTOMATED RETICULOCYTE COUNT: CPT

## 2024-01-31 PROCEDURE — 36415 COLL VENOUS BLD VENIPUNCTURE: CPT

## 2024-01-31 PROCEDURE — 82607 VITAMIN B-12: CPT

## 2024-01-31 PROCEDURE — 84443 ASSAY THYROID STIM HORMONE: CPT

## 2024-01-31 PROCEDURE — 83540 ASSAY OF IRON: CPT

## 2024-01-31 PROCEDURE — 83036 HEMOGLOBIN GLYCOSYLATED A1C: CPT

## 2024-01-31 PROCEDURE — 82728 ASSAY OF FERRITIN: CPT

## 2024-02-05 ENCOUNTER — OFFICE VISIT (OUTPATIENT)
Dept: INTERNAL MEDICINE | Facility: CLINIC | Age: 74
End: 2024-02-05
Payer: MEDICARE

## 2024-02-05 ENCOUNTER — LAB VISIT (OUTPATIENT)
Dept: LAB | Facility: HOSPITAL | Age: 74
End: 2024-02-05
Attending: STUDENT IN AN ORGANIZED HEALTH CARE EDUCATION/TRAINING PROGRAM
Payer: MEDICARE

## 2024-02-05 VITALS
SYSTOLIC BLOOD PRESSURE: 138 MMHG | DIASTOLIC BLOOD PRESSURE: 60 MMHG | BODY MASS INDEX: 36.29 KG/M2 | OXYGEN SATURATION: 97 % | HEART RATE: 66 BPM | HEIGHT: 69 IN | WEIGHT: 245 LBS

## 2024-02-05 DIAGNOSIS — M10.9 GOUT, UNSPECIFIED CAUSE, UNSPECIFIED CHRONICITY, UNSPECIFIED SITE: Chronic | ICD-10-CM

## 2024-02-05 DIAGNOSIS — M19.90 INFLAMMATORY ARTHRITIS: Primary | ICD-10-CM

## 2024-02-05 DIAGNOSIS — M19.90 INFLAMMATORY ARTHRITIS: ICD-10-CM

## 2024-02-05 DIAGNOSIS — I25.119 ATHEROSCLEROSIS OF NATIVE CORONARY ARTERY OF NATIVE HEART WITH ANGINA PECTORIS: ICD-10-CM

## 2024-02-05 DIAGNOSIS — E11.42 TYPE 2 DIABETES MELLITUS WITH DIABETIC POLYNEUROPATHY, WITHOUT LONG-TERM CURRENT USE OF INSULIN: ICD-10-CM

## 2024-02-05 PROBLEM — L97.521 NON-PRESSURE CHRONIC ULCER OF OTHER PART OF LEFT FOOT LIMITED TO BREAKDOWN OF SKIN: Status: ACTIVE | Noted: 2024-02-05

## 2024-02-05 LAB — RHEUMATOID FACT SERPL-ACNC: <13 IU/ML

## 2024-02-05 PROCEDURE — 86200 CCP ANTIBODY: CPT

## 2024-02-05 PROCEDURE — 86431 RHEUMATOID FACTOR QUANT: CPT | Mod: 91

## 2024-02-05 PROCEDURE — 36415 COLL VENOUS BLD VENIPUNCTURE: CPT

## 2024-02-05 PROCEDURE — 86039 ANTINUCLEAR ANTIBODIES (ANA): CPT

## 2024-02-05 PROCEDURE — 86431 RHEUMATOID FACTOR QUANT: CPT

## 2024-02-05 PROCEDURE — 99214 OFFICE O/P EST MOD 30 MIN: CPT | Mod: ,,, | Performed by: STUDENT IN AN ORGANIZED HEALTH CARE EDUCATION/TRAINING PROGRAM

## 2024-02-05 RX ORDER — DICLOFENAC SODIUM 75 MG/1
75 TABLET, DELAYED RELEASE ORAL DAILY
Qty: 90 TABLET | Refills: 3 | Status: SHIPPED | OUTPATIENT
Start: 2024-02-05

## 2024-02-05 RX ORDER — PREDNISONE 20 MG/1
40 TABLET ORAL DAILY
Qty: 10 TABLET | Refills: 0 | Status: SHIPPED | OUTPATIENT
Start: 2024-02-05 | End: 2024-02-10

## 2024-02-05 NOTE — ASSESSMENT & PLAN NOTE
Involving both upper extremities  Erosive changes seen on CT of R upper extremity consistent with RA  Will check AZUCENA, Rh factor, CCP  Will start prednisone 20 mg for 5 days to help with inflammation   Will refer to Rheumatology

## 2024-02-05 NOTE — PROGRESS NOTES
Subjective:      Darwin Fletcher  02/05/2024  [id number]      Chief Complaint: Follow-up and Discuss RA       HPI:  Mr Granados presents for follow up. Patient states for the past 2-4 months he started noticing stiffness and swelling of his right hand, this progressed to limited range of motion and tendon rupture when now he is not able to move hand. Patient has followed with Orthopedics and is scheduled for tendon transfer surgery on his right arm. Patient states he started to notice stiffness and swelling on his left hand now, stiffness is worse in the mornings, lasts for 30-40 min and improves throughout the day.     Past Medical History:   Diagnosis Date    Arthritis     Bulging of lumbar intervertebral disc     CAD (coronary artery disease)     Class 2 severe obesity due to excess calories with serious comorbidity and body mass index (BMI) of 36.0 to 36.9 in adult 07/05/2023    Gout 06/06/2022    Hypertension 06/06/2022    Hypothyroidism 06/06/2022    Personal history of colonic polyps     Sleep apnea 06/06/2022    CPAP    Type 2 diabetes mellitus with diabetic polyneuropathy, without long-term current use of insulin 06/06/2022     Past Surgical History:   Procedure Laterality Date    CHOLECYSTECTOMY      COLONOSCOPY  03/17/2015    COLONOSCOPY Bilateral 07/20/2020    CORONARY ANGIOPLASTY WITH STENT PLACEMENT N/A     Excision of Skin Cancer (Arm)  N/A     EYE SURGERY      INJECTION OF ANESTHETIC AGENT AROUND MEDIAL BRANCH NERVES INNERVATING LUMBAR FACET JOINT N/A 10/26/2022    Procedure: Block-nerve-medial branch-lumbar;  Surgeon: Samreen Horne MD;  Location: [facility];  Service: Pain Management;  Laterality: N/A;  Left L4/L5    KNEE ARTHROSCOPY Left     TRANSFORAMINAL EPIDURAL INJECTION OF STEROID Left 1/5/2023    Procedure: INJECTION, STEROID, EPIDURAL, TRANSFORAMINAL APPROACH Left L5;  Surgeon: Samreen Horne MD;  Location: [facility] OR;  Service: Pain Management;  Laterality: Left;  L5    VASECTOMY N/A       Family History   Problem Relation Age of Onset    Heart attack Mother     Lung cancer Father     Cancer Father      Social History     Tobacco Use    Smoking status: Former     Current packs/day: 2.00     Average packs/day: 2.0 packs/day for 35.0 years (70.0 ttl pk-yrs)     Types: Cigarettes    Smokeless tobacco: Never   Substance and Sexual Activity    Alcohol use: Yes     Alcohol/week: 9.0 standard drinks of alcohol     Types: 2 Glasses of wine, 2 Cans of beer, 3 Shots of liquor, 2 Drinks containing 0.5 oz of alcohol per week     Comment: twice weekly    Drug use: Never    Sexual activity: Yes     Partners: Female     Review of patient's allergies indicates:  No Known Allergies    The following were reviewed at this visit: active problem list, medication list, allergies, family history, social history, and health maintenance.    Medications:    Current Outpatient Medications:     allopurinoL (ZYLOPRIM) 300 MG tablet, Take 1 tablet by mouth once daily. (Patient taking differently: Take 300 mg by mouth once daily.), Disp: 90 tablet, Rfl: 3    amLODIPine (NORVASC) 10 MG tablet, Take 1 tablet by mouth once daily. (Patient taking differently: Take 5 mg by mouth once daily.), Disp: 90 tablet, Rfl: 3    amLODIPine (NORVASC) 5 MG tablet, Take 5 mg by mouth once daily., Disp: , Rfl:     aspirin 81 mg Cap, Take 1 tablet by mouth 2 (two) times a day., Disp: , Rfl:     fenofibrate 160 MG Tab, Take 1 tablet by mouth once daily., Disp: 90 tablet, Rfl: 3    glimepiride (AMARYL) 1 MG tablet, Take 1 tablet (1 mg total) by mouth 2 (two) times daily., Disp: 180 tablet, Rfl: 2    glucosamine-chondroitin 250-200 mg Tab, Take 1 tablet by mouth Daily., Disp: , Rfl:     iron-vit c-b12-folic acid (IRON 100 PLUS) Tab, 1 tablet Daily., Disp: , Rfl:     levothyroxine (SYNTHROID, LEVOTHROID) 175 MCG tablet, Take 1 tablet (175 mcg total) by mouth before breakfast., Disp: 90 tablet, Rfl: 2    lisinopriL 10 MG tablet, Take 1 tablet (10 mg  "total) by mouth once daily., Disp: 90 tablet, Rfl: 3    metFORMIN (GLUCOPHAGE) 500 MG tablet, Take 1/2 tablet bid (Patient taking differently: Take 250 mg by mouth 2 (two) times daily with meals. Take 1/2 tablet bid), Disp: 90 tablet, Rfl: 2    traZODone (DESYREL) 50 MG tablet, Take 1 tablet by mouth every evening., Disp: 90 tablet, Rfl: 3    diclofenac (VOLTAREN) 75 MG EC tablet, Take 1 tablet (75 mg total) by mouth Daily., Disp: 90 tablet, Rfl: 3    omeprazole (PRILOSEC) 40 MG capsule, Take 1 capsule by mouth daily., Disp: 90 capsule, Rfl: 3    predniSONE (DELTASONE) 20 MG tablet, Take 2 tablets (40 mg total) by mouth once daily. for 5 days, Disp: 10 tablet, Rfl: 0      Medications have been reviewed and reconciled with patient at this visit.  Barriers to medications reviewed with patient.    Adverse reactions to current medications reviewed with patient..    Over the counter medications reviewed and reconciled with patient.  Review of Systems   Constitutional:  Negative for chills, diaphoresis, fever and weight loss.   HENT:  Negative for congestion, ear discharge, sinus pain and sore throat.    Eyes:  Negative for photophobia.   Respiratory:  Negative for cough, hemoptysis and shortness of breath.    Cardiovascular:  Negative for chest pain, palpitations, claudication, leg swelling and PND.   Gastrointestinal:  Negative for constipation, diarrhea, nausea and vomiting.   Genitourinary:  Negative for dysuria, frequency and urgency.   Musculoskeletal:  Positive for joint pain. Negative for back pain, myalgias and neck pain.   Skin:  Negative for itching and rash.   Neurological:  Negative for dizziness, focal weakness and headaches.   Psychiatric/Behavioral:  Negative for depression. The patient does not have insomnia.            Objective:      Vitals:    02/05/24 0825   BP: 138/60   Pulse: 66   SpO2: 97%   Weight: 111.1 kg (245 lb)   Height: 5' 9" (1.753 m)       Physical Exam  Constitutional:       Appearance: " Normal appearance.   HENT:      Head: Normocephalic and atraumatic.   Cardiovascular:      Rate and Rhythm: Normal rate and regular rhythm.      Pulses: Normal pulses.   Pulmonary:      Effort: Pulmonary effort is normal.      Breath sounds: Normal breath sounds.   Abdominal:      General: Abdomen is flat. Bowel sounds are normal. There is no distension.      Palpations: Abdomen is soft.      Tenderness: There is no abdominal tenderness.   Musculoskeletal:         General: No tenderness. Normal range of motion.      Right lower leg: No edema.      Left lower leg: No edema.      Comments: Swelling and limited ROM of R upper extremity    Lymphadenopathy:      Cervical: No cervical adenopathy.   Neurological:      General: No focal deficit present.      Mental Status: He is alert and oriented to person, place, and time.               Assessment and Plan:       1. Inflammatory arthritis  Assessment & Plan:  Involving both upper extremities  Erosive changes seen on CT of R upper extremity consistent with RA  Will check AZUCENA, Rh factor, CCP  Will start prednisone 20 mg for 5 days to help with inflammation   Will refer to Rheumatology     Orders:  -     CYCLIC CITRULLINATED PEPTIDE (CCP) ANTIBODY; Future; Expected date: 02/05/2024  -     Rheumatoid Factors, IgA, IgG, IgM; Future; Expected date: 02/05/2024  -     AZUCENA by IFA, w/Rflx; Future; Expected date: 02/05/2024    2. Type 2 diabetes mellitus with diabetic polyneuropathy, without long-term current use of insulin  Assessment & Plan:  A1C of 5.2, controlled  Continue metformin and glimepiride       3. Atherosclerosis of native coronary artery of native heart with angina pectoris  Assessment & Plan:  Stable  Continue aspirin       4. Gout, unspecified cause, unspecified chronicity, unspecified site  Assessment & Plan:  Stable   On allopurinol daily     Orders:  -     diclofenac (VOLTAREN) 75 MG EC tablet; Take 1 tablet (75 mg total) by mouth Daily.  Dispense: 90 tablet;  Refill: 3    Other orders  -     predniSONE (DELTASONE) 20 MG tablet; Take 2 tablets (40 mg total) by mouth once daily. for 5 days  Dispense: 10 tablet; Refill: 0            Follow up: Follow up in about 6 weeks (around 3/18/2024) for Medication f/u.

## 2024-02-06 LAB — ANA SER QL HEP2 SUBST: NORMAL

## 2024-02-06 RX ORDER — LANOLIN ALCOHOL/MO/W.PET/CERES
50 CREAM (GRAM) TOPICAL DAILY
COMMUNITY

## 2024-02-06 RX ORDER — LANOLIN ALCOHOL/MO/W.PET/CERES
100 CREAM (GRAM) TOPICAL DAILY
COMMUNITY

## 2024-02-07 ENCOUNTER — PATIENT MESSAGE (OUTPATIENT)
Dept: INTERNAL MEDICINE | Facility: CLINIC | Age: 74
End: 2024-02-07
Payer: MEDICARE

## 2024-02-07 LAB
CYCLIC CITRULLINATED PEPTIDE (CCP) (OHS): <0.4 U/ML
RHEUMATOID FACTOR IGA QUANTITATIVE (OLG): 1.9 IU/ML
RHEUMATOID FACTOR IGM QUANTITATIVE (OLG): <0.6 IU/ML

## 2024-02-07 NOTE — PROGRESS NOTES
Please let patient know of negative results for markers of RA, this does not mean he does not hjave it as it is possible to have symptoms without markers being elevated. Still needs referral to Rheumatology  No pertinent past medical history

## 2024-02-08 ENCOUNTER — TELEPHONE (OUTPATIENT)
Dept: ORTHOPEDICS | Facility: CLINIC | Age: 74
End: 2024-02-08
Payer: MEDICARE

## 2024-02-08 NOTE — TELEPHONE ENCOUNTER
Per Dr. Price I called to change the patients surgery date to 02/22/24 instead of 02/23/24.  Patient agreed to surgery on 02/22/24 but has to confirm that he has someone to pick him up from surgery.     Patient will call me back to confirm that someone is able to pick him up from surgery.

## 2024-02-19 ENCOUNTER — ANESTHESIA EVENT (OUTPATIENT)
Dept: SURGERY | Facility: HOSPITAL | Age: 74
End: 2024-02-19
Payer: MEDICARE

## 2024-02-22 ENCOUNTER — ANESTHESIA (OUTPATIENT)
Dept: SURGERY | Facility: HOSPITAL | Age: 74
End: 2024-02-22
Payer: MEDICARE

## 2024-02-22 ENCOUNTER — HOSPITAL ENCOUNTER (OUTPATIENT)
Facility: HOSPITAL | Age: 74
Discharge: HOME | End: 2024-02-22
Attending: STUDENT IN AN ORGANIZED HEALTH CARE EDUCATION/TRAINING PROGRAM | Admitting: STUDENT IN AN ORGANIZED HEALTH CARE EDUCATION/TRAINING PROGRAM
Payer: MEDICARE

## 2024-02-22 VITALS
TEMPERATURE: 97 F | DIASTOLIC BLOOD PRESSURE: 62 MMHG | BODY MASS INDEX: 35.56 KG/M2 | HEART RATE: 54 BPM | WEIGHT: 240.06 LBS | RESPIRATION RATE: 16 BRPM | SYSTOLIC BLOOD PRESSURE: 129 MMHG | OXYGEN SATURATION: 93 % | HEIGHT: 69 IN

## 2024-02-22 DIAGNOSIS — M66.231 EXTENSOR TENDON RUPTURE, NON-TRAUMATIC, HAND AND WRIST, RIGHT: ICD-10-CM

## 2024-02-22 DIAGNOSIS — M66.241 EXTENSOR TENDON RUPTURE, NON-TRAUMATIC, HAND AND WRIST, RIGHT: ICD-10-CM

## 2024-02-22 DIAGNOSIS — S66.811A EXTENSOR TENDON RUPTURE OF HAND, RIGHT, INITIAL ENCOUNTER: ICD-10-CM

## 2024-02-22 LAB — POCT GLUCOSE: 114 MG/DL (ref 70–110)

## 2024-02-22 PROCEDURE — D9220A PRA ANESTHESIA: Mod: ANES,,, | Performed by: ANESTHESIOLOGY

## 2024-02-22 PROCEDURE — 63600175 PHARM REV CODE 636 W HCPCS

## 2024-02-22 PROCEDURE — 25000003 PHARM REV CODE 250

## 2024-02-22 PROCEDURE — 37000009 HC ANESTHESIA EA ADD 15 MINS: Performed by: STUDENT IN AN ORGANIZED HEALTH CARE EDUCATION/TRAINING PROGRAM

## 2024-02-22 PROCEDURE — 25310 TRANSPLANT FOREARM TENDON: CPT | Mod: RT,,, | Performed by: STUDENT IN AN ORGANIZED HEALTH CARE EDUCATION/TRAINING PROGRAM

## 2024-02-22 PROCEDURE — 63600175 PHARM REV CODE 636 W HCPCS: Performed by: ANESTHESIOLOGY

## 2024-02-22 PROCEDURE — 82962 GLUCOSE BLOOD TEST: CPT | Performed by: STUDENT IN AN ORGANIZED HEALTH CARE EDUCATION/TRAINING PROGRAM

## 2024-02-22 PROCEDURE — 37000008 HC ANESTHESIA 1ST 15 MINUTES: Performed by: STUDENT IN AN ORGANIZED HEALTH CARE EDUCATION/TRAINING PROGRAM

## 2024-02-22 PROCEDURE — 25000003 PHARM REV CODE 250: Performed by: STUDENT IN AN ORGANIZED HEALTH CARE EDUCATION/TRAINING PROGRAM

## 2024-02-22 PROCEDURE — 36000707: Performed by: STUDENT IN AN ORGANIZED HEALTH CARE EDUCATION/TRAINING PROGRAM

## 2024-02-22 PROCEDURE — 25000003 PHARM REV CODE 250: Performed by: ANESTHESIOLOGY

## 2024-02-22 PROCEDURE — 63600175 PHARM REV CODE 636 W HCPCS: Performed by: STUDENT IN AN ORGANIZED HEALTH CARE EDUCATION/TRAINING PROGRAM

## 2024-02-22 PROCEDURE — 36000706: Performed by: STUDENT IN AN ORGANIZED HEALTH CARE EDUCATION/TRAINING PROGRAM

## 2024-02-22 PROCEDURE — 76942 ECHO GUIDE FOR BIOPSY: CPT | Performed by: ANESTHESIOLOGY

## 2024-02-22 PROCEDURE — 71000015 HC POSTOP RECOV 1ST HR: Performed by: STUDENT IN AN ORGANIZED HEALTH CARE EDUCATION/TRAINING PROGRAM

## 2024-02-22 PROCEDURE — D9220A PRA ANESTHESIA: Mod: CRNA,,,

## 2024-02-22 PROCEDURE — 25310 TRANSPLANT FOREARM TENDON: CPT | Mod: AS,RT,,

## 2024-02-22 RX ORDER — MUPIROCIN 20 MG/G
OINTMENT TOPICAL 2 TIMES DAILY
Status: DISCONTINUED | OUTPATIENT
Start: 2024-02-22 | End: 2024-02-22 | Stop reason: HOSPADM

## 2024-02-22 RX ORDER — ONDANSETRON HYDROCHLORIDE 2 MG/ML
INJECTION, SOLUTION INTRAVENOUS
Status: DISCONTINUED | OUTPATIENT
Start: 2024-02-22 | End: 2024-02-22

## 2024-02-22 RX ORDER — METHOCARBAMOL 100 MG/ML
1000 INJECTION, SOLUTION INTRAMUSCULAR; INTRAVENOUS ONCE
Status: DISCONTINUED | OUTPATIENT
Start: 2024-02-22 | End: 2024-02-22 | Stop reason: HOSPADM

## 2024-02-22 RX ORDER — SODIUM CHLORIDE, SODIUM LACTATE, POTASSIUM CHLORIDE, CALCIUM CHLORIDE 600; 310; 30; 20 MG/100ML; MG/100ML; MG/100ML; MG/100ML
1000 INJECTION, SOLUTION INTRAVENOUS ONCE
Status: CANCELLED | OUTPATIENT
Start: 2024-02-22 | End: 2024-02-22

## 2024-02-22 RX ORDER — SODIUM CHLORIDE 9 MG/ML
INJECTION, SOLUTION INTRAVENOUS CONTINUOUS
Status: DISCONTINUED | OUTPATIENT
Start: 2024-02-22 | End: 2024-02-22 | Stop reason: HOSPADM

## 2024-02-22 RX ORDER — ACETAMINOPHEN 500 MG
1000 TABLET ORAL
Status: DISPENSED | OUTPATIENT
Start: 2024-02-22

## 2024-02-22 RX ORDER — GLYCOPYRROLATE 0.2 MG/ML
INJECTION INTRAMUSCULAR; INTRAVENOUS
Status: DISCONTINUED | OUTPATIENT
Start: 2024-02-22 | End: 2024-02-22

## 2024-02-22 RX ORDER — HYDROMORPHONE HYDROCHLORIDE 2 MG/ML
0.4 INJECTION, SOLUTION INTRAMUSCULAR; INTRAVENOUS; SUBCUTANEOUS EVERY 5 MIN PRN
Status: ACTIVE | OUTPATIENT
Start: 2024-02-22

## 2024-02-22 RX ORDER — ROPIVACAINE HYDROCHLORIDE 5 MG/ML
INJECTION, SOLUTION EPIDURAL; INFILTRATION; PERINEURAL
Status: COMPLETED | OUTPATIENT
Start: 2024-02-22 | End: 2024-02-22

## 2024-02-22 RX ORDER — CELECOXIB 200 MG/1
200 CAPSULE ORAL
Status: DISPENSED | OUTPATIENT
Start: 2024-02-22

## 2024-02-22 RX ORDER — MEPERIDINE HYDROCHLORIDE 25 MG/ML
6.25 INJECTION INTRAMUSCULAR; INTRAVENOUS; SUBCUTANEOUS ONCE AS NEEDED
Status: ACTIVE | OUTPATIENT
Start: 2024-02-22 | End: 2024-02-23

## 2024-02-22 RX ORDER — SODIUM CHLORIDE, SODIUM GLUCONATE, SODIUM ACETATE, POTASSIUM CHLORIDE AND MAGNESIUM CHLORIDE 30; 37; 368; 526; 502 MG/100ML; MG/100ML; MG/100ML; MG/100ML; MG/100ML
INJECTION, SOLUTION INTRAVENOUS CONTINUOUS
Status: ACTIVE | OUTPATIENT
Start: 2024-02-22 | End: 2024-03-23

## 2024-02-22 RX ORDER — HYDROCODONE BITARTRATE AND ACETAMINOPHEN 5; 325 MG/1; MG/1
1 TABLET ORAL
Status: ACTIVE | OUTPATIENT
Start: 2024-02-22

## 2024-02-22 RX ORDER — DEXAMETHASONE SODIUM PHOSPHATE 4 MG/ML
INJECTION, SOLUTION INTRA-ARTICULAR; INTRALESIONAL; INTRAMUSCULAR; INTRAVENOUS; SOFT TISSUE
Status: DISCONTINUED | OUTPATIENT
Start: 2024-02-22 | End: 2024-02-22

## 2024-02-22 RX ORDER — PROPOFOL 10 MG/ML
VIAL (ML) INTRAVENOUS
Status: DISCONTINUED | OUTPATIENT
Start: 2024-02-22 | End: 2024-02-22

## 2024-02-22 RX ORDER — ONDANSETRON HYDROCHLORIDE 2 MG/ML
4 INJECTION, SOLUTION INTRAVENOUS DAILY PRN
Status: ACTIVE | OUTPATIENT
Start: 2024-02-22

## 2024-02-22 RX ORDER — ROPIVACAINE HYDROCHLORIDE 5 MG/ML
30 INJECTION, SOLUTION EPIDURAL; INFILTRATION; PERINEURAL ONCE
Status: CANCELLED | OUTPATIENT
Start: 2024-02-22 | End: 2024-02-22

## 2024-02-22 RX ORDER — HYDROCODONE BITARTRATE AND ACETAMINOPHEN 5; 325 MG/1; MG/1
1 TABLET ORAL EVERY 4 HOURS PRN
Qty: 28 TABLET | Refills: 0 | Status: SHIPPED | OUTPATIENT
Start: 2024-02-22

## 2024-02-22 RX ORDER — KETAMINE HCL IN 0.9 % NACL 50 MG/5 ML
SYRINGE (ML) INTRAVENOUS
Status: DISCONTINUED
Start: 2024-02-22 | End: 2024-02-22 | Stop reason: HOSPADM

## 2024-02-22 RX ORDER — METOCLOPRAMIDE HYDROCHLORIDE 5 MG/ML
10 INJECTION INTRAMUSCULAR; INTRAVENOUS EVERY 6 HOURS PRN
Status: DISCONTINUED | OUTPATIENT
Start: 2024-02-22 | End: 2024-02-22 | Stop reason: HOSPADM

## 2024-02-22 RX ORDER — LIDOCAINE HYDROCHLORIDE 20 MG/ML
INJECTION, SOLUTION EPIDURAL; INFILTRATION; INTRACAUDAL; PERINEURAL
Status: DISCONTINUED | OUTPATIENT
Start: 2024-02-22 | End: 2024-02-22

## 2024-02-22 RX ORDER — ONDANSETRON HYDROCHLORIDE 2 MG/ML
4 INJECTION, SOLUTION INTRAVENOUS EVERY 6 HOURS PRN
Status: DISCONTINUED | OUTPATIENT
Start: 2024-02-22 | End: 2024-02-22 | Stop reason: HOSPADM

## 2024-02-22 RX ORDER — MORPHINE SULFATE 4 MG/ML
4 INJECTION, SOLUTION INTRAMUSCULAR; INTRAVENOUS
Status: DISCONTINUED | OUTPATIENT
Start: 2024-02-22 | End: 2024-02-22 | Stop reason: HOSPADM

## 2024-02-22 RX ORDER — MIDAZOLAM HYDROCHLORIDE 1 MG/ML
.5-4 INJECTION INTRAMUSCULAR; INTRAVENOUS
Status: CANCELLED | OUTPATIENT
Start: 2024-02-22

## 2024-02-22 RX ORDER — MIDAZOLAM HYDROCHLORIDE 1 MG/ML
2 INJECTION INTRAMUSCULAR; INTRAVENOUS
Status: ACTIVE | OUTPATIENT
Start: 2024-02-22

## 2024-02-22 RX ORDER — HYDROCODONE BITARTRATE AND ACETAMINOPHEN 5; 325 MG/1; MG/1
1 TABLET ORAL EVERY 4 HOURS PRN
Status: DISCONTINUED | OUTPATIENT
Start: 2024-02-22 | End: 2024-02-22 | Stop reason: HOSPADM

## 2024-02-22 RX ORDER — MIDAZOLAM HYDROCHLORIDE 1 MG/ML
INJECTION INTRAMUSCULAR; INTRAVENOUS
Status: COMPLETED
Start: 2024-02-22 | End: 2024-02-22

## 2024-02-22 RX ORDER — METHOCARBAMOL 500 MG/1
500 TABLET, FILM COATED ORAL EVERY 6 HOURS PRN
Status: DISCONTINUED | OUTPATIENT
Start: 2024-02-22 | End: 2024-02-22 | Stop reason: HOSPADM

## 2024-02-22 RX ORDER — ONDANSETRON 4 MG/1
4 TABLET, ORALLY DISINTEGRATING ORAL
Status: COMPLETED | OUTPATIENT
Start: 2024-02-22 | End: 2024-02-22

## 2024-02-22 RX ORDER — ROPIVACAINE HYDROCHLORIDE 5 MG/ML
INJECTION, SOLUTION EPIDURAL; INFILTRATION; PERINEURAL
Status: COMPLETED
Start: 2024-02-22 | End: 2024-02-22

## 2024-02-22 RX ORDER — FENTANYL CITRATE 50 UG/ML
INJECTION, SOLUTION INTRAMUSCULAR; INTRAVENOUS
Status: DISCONTINUED | OUTPATIENT
Start: 2024-02-22 | End: 2024-02-22

## 2024-02-22 RX ORDER — PROCHLORPERAZINE EDISYLATE 5 MG/ML
5 INJECTION INTRAMUSCULAR; INTRAVENOUS EVERY 30 MIN PRN
Status: ACTIVE | OUTPATIENT
Start: 2024-02-22

## 2024-02-22 RX ORDER — KETAMINE HYDROCHLORIDE 100 MG/ML
INJECTION, SOLUTION INTRAMUSCULAR; INTRAVENOUS
Status: DISCONTINUED | OUTPATIENT
Start: 2024-02-22 | End: 2024-02-22

## 2024-02-22 RX ADMIN — PROPOFOL 60 MG: 10 INJECTION, EMULSION INTRAVENOUS at 11:02

## 2024-02-22 RX ADMIN — GLYCOPYRROLATE 0.2 MG: 0.2 INJECTION INTRAMUSCULAR; INTRAVENOUS at 09:02

## 2024-02-22 RX ADMIN — KETAMINE HYDROCHLORIDE 30 MG: 100 INJECTION, SOLUTION INTRAMUSCULAR; INTRAVENOUS at 09:02

## 2024-02-22 RX ADMIN — LIDOCAINE HYDROCHLORIDE 100 MG: 20 INJECTION, SOLUTION EPIDURAL; INFILTRATION; INTRACAUDAL; PERINEURAL at 09:02

## 2024-02-22 RX ADMIN — SODIUM CHLORIDE, SODIUM GLUCONATE, SODIUM ACETATE, POTASSIUM CHLORIDE AND MAGNESIUM CHLORIDE: 526; 502; 368; 37; 30 INJECTION, SOLUTION INTRAVENOUS at 08:02

## 2024-02-22 RX ADMIN — ACETAMINOPHEN 1000 MG: 500 TABLET ORAL at 08:02

## 2024-02-22 RX ADMIN — KETAMINE HYDROCHLORIDE 10 MG: 100 INJECTION, SOLUTION INTRAMUSCULAR; INTRAVENOUS at 10:02

## 2024-02-22 RX ADMIN — ROPIVACAINE HYDROCHLORIDE 40 ML: 5 INJECTION, SOLUTION EPIDURAL; INFILTRATION; PERINEURAL at 08:02

## 2024-02-22 RX ADMIN — KETAMINE HYDROCHLORIDE 10 MG: 100 INJECTION, SOLUTION INTRAMUSCULAR; INTRAVENOUS at 09:02

## 2024-02-22 RX ADMIN — CEFAZOLIN 2 G: 2 INJECTION, POWDER, FOR SOLUTION INTRAMUSCULAR; INTRAVENOUS at 09:02

## 2024-02-22 RX ADMIN — FENTANYL CITRATE 50 MCG: 50 INJECTION, SOLUTION INTRAMUSCULAR; INTRAVENOUS at 09:02

## 2024-02-22 RX ADMIN — ONDANSETRON HYDROCHLORIDE 4 MG: 2 SOLUTION INTRAMUSCULAR; INTRAVENOUS at 11:02

## 2024-02-22 RX ADMIN — PROPOFOL 80 MG: 10 INJECTION, EMULSION INTRAVENOUS at 09:02

## 2024-02-22 RX ADMIN — MIDAZOLAM HYDROCHLORIDE 2 MG: 1 INJECTION INTRAMUSCULAR; INTRAVENOUS at 08:02

## 2024-02-22 RX ADMIN — SODIUM CHLORIDE, SODIUM GLUCONATE, SODIUM ACETATE, POTASSIUM CHLORIDE AND MAGNESIUM CHLORIDE: 526; 502; 368; 37; 30 INJECTION, SOLUTION INTRAVENOUS at 09:02

## 2024-02-22 RX ADMIN — MIDAZOLAM 2 MG: 1 INJECTION INTRAMUSCULAR; INTRAVENOUS at 08:02

## 2024-02-22 RX ADMIN — PROPOFOL 40 MG: 10 INJECTION, EMULSION INTRAVENOUS at 11:02

## 2024-02-22 RX ADMIN — DEXAMETHASONE SODIUM PHOSPHATE 4 MG: 4 INJECTION, SOLUTION INTRA-ARTICULAR; INTRALESIONAL; INTRAMUSCULAR; INTRAVENOUS; SOFT TISSUE at 09:02

## 2024-02-22 RX ADMIN — PROPOFOL 50 MG: 10 INJECTION, EMULSION INTRAVENOUS at 09:02

## 2024-02-22 RX ADMIN — ONDANSETRON 4 MG: 4 TABLET, ORALLY DISINTEGRATING ORAL at 08:02

## 2024-02-22 RX ADMIN — CELECOXIB 200 MG: 200 CAPSULE ORAL at 08:02

## 2024-02-22 NOTE — DISCHARGE SUMMARY
[facility] - Per[facility] Services  Discharge Note  Short Stay    Procedure(s) (LRB):  TRANSFER, TENDON, HAND (Right)      OUTCOME: Patient tolerated treatment/procedure well without complication and is now ready for discharge.    DISPOSITION: Home or Self Care    FINAL DIAGNOSIS:  Extensor tendon rupture    FOLLOWUP: In clinic    DISCHARGE INSTRUCTIONS:    Discharge Procedure Orders   Diet general     Activity as tolerated     Keep surgical extremity elevated     Ice to affected area     Lifting restrictions   Order Comments: No lifting, pushing, pulling with operative extremity     No driving, operating heavy equipment or signing legal documents while taking pain medication.     Other restrictions (specify):   Order Comments: Okay to wean sling as tolerated.     Leave dressing on - Keep it clean, dry, and intact until clinic visit     Call MD for:  temperature >100.4     Call MD for:  persistent nausea and vomiting     Call MD for:  severe uncontrolled pain     Call MD for:  difficulty breathing, headache or visual disturbances     Call MD for:  redness, tenderness, or signs of infection (pain, swelling, redness, odor or green/yellow discharge around incision site)     Call MD for:  hives     Call MD for:  persistent dizziness or light-headedness     Call MD for:  extreme fatigue     Shower on day dressing removed (No bath)        TIME SPENT ON DISCHARGE: 5 minutes

## 2024-02-22 NOTE — PLAN OF CARE
Preparing for discharge. Vss. Inst reviewed. Reshma SHERMAN. Discharged with hand elevation pillow.

## 2024-02-22 NOTE — ANESTHESIA POSTPROCEDURE EVALUATION
Anesthesia Post Evaluation    Patient: Dariwn Fletcher    Procedure(s) Performed: Procedure(s) (LRB):  TRANSFER, TENDON, HAND (Right)    Final Anesthesia Type: regional      Patient location during evaluation: [facility]  Patient participation: Yes- Able to Participate  Level of consciousness: awake and alert  Post-procedure vital signs: reviewed and stable  Pain management: adequate  Airway patency: patent    PONV status at discharge: No PONV  Anesthetic complications: no      Cardiovascular status: blood pressure returned to baseline and hemodynamically stable  Respiratory status: unassisted and spontaneous ventilation  Hydration status: euvolemic  Follow-up not needed.              Vitals Value Taken Time   /62 02/22/24 1220   Temp  02/22/24 1328   Pulse 54 02/22/24 1220   Resp 16 02/22/24 1220   SpO2 93 % 02/22/24 1220         No case tracking events are documented in the log.      Pain/Lisandro Score: Pain Rating Prior to Med Admin: 1 (2/22/2024  8:18 AM)

## 2024-02-22 NOTE — ANESTHESIA PROCEDURE NOTES
Peripheral Block    Patient location during procedure: pre-op    Reason for block: primary anesthetic    Diagnosis: Right Tendon rupture for transfer/repair   Start time: 2/22/2024 8:42 AM  Timeout: 2/22/2024 8:40 AM   End time: 2/22/2024 8:43 AM    Staffing  Authorizing Provider: Edyta Zaidi MD  Performing Provider: Edyta Zaidi MD    Staffing  Performed by: Edyta Zaidi MD  Authorized by: Edyta Zaidi MD    Preanesthetic Checklist  Completed: patient identified, IV checked, site marked, risks and benefits discussed, surgical consent, monitors and equipment checked, pre-op evaluation and timeout performed  Peripheral Block  Patient position: supine  Prep: ChloraPrep  Patient monitoring: heart rate, cardiac monitor, continuous pulse ox, continuous capnometry and frequent blood pressure checks  Block type: supraclavicular  Laterality: right  Injection technique: single shot  Needle  Needle type: Stimuplex   Needle gauge: 22 G  Needle length: 2 in  Needle localization: anatomical landmarks and ultrasound guidance   -ultrasound image captured on disc.  Assessment  Injection assessment: negative aspiration, negative parasthesia and local visualized surrounding nerve  Paresthesia pain: none  Heart rate change: no  Slow fractionated injection: yes  Pain Tolerance: comfortable throughout block and no complaints  Medications:    Medications: ropivacaine (NAROPIN) injection 0.5% - Perineural   40 mL - 2/22/2024 8:40:00 AM

## 2024-02-22 NOTE — TRANSFER OF CARE
"Anesthesia Transfer of Care Note    Patient: Darwin Fletcher    Procedure(s) Performed: Procedure(s) (LRB):  TRANSFER, TENDON, HAND (Right)    Patient location: OPS    Anesthesia Type: MAC and regional    Transport from OR: Transported from OR on room air with adequate spontaneous ventilation    Post pain: adequate analgesia    Post assessment: no apparent anesthetic complications    Post vital signs: stable    Level of consciousness: awake, alert and oriented    Nausea/Vomiting: no nausea/vomiting    Complications: none    Transfer of care protocol was followed      Last vitals: Visit Vitals  BP (!) 157/67 (BP Location: Left arm, Patient Position: Lying)   Pulse (!) 58   Temp 35.9 °C (96.7 °F) (Tympanic)   Resp 14   Ht 5' 9" (1.753 m)   Wt 108.9 kg (240 lb 1.3 oz)   SpO2 95%   BMI 35.45 kg/m²     "

## 2024-02-22 NOTE — ANESTHESIA PREPROCEDURE EVALUATION
02/22/2024  Darwin Fletcher is a 73 y.o., male with ----------------------------  Arthritis  Bulging of lumbar intervertebral disc  CAD (coronary artery disease)  Class 2 severe obesity due to excess calories with serious   comorbidity and body mass index (BMI) of 36.0 to 36.9 in adult  Digestive disorder  Gout  Hypertension  Hypothyroidism  Personal history of colonic polyps  Sleep apnea      Comment:  CPAP  Type 2 diabetes mellitus with diabetic polyneuropathy, without long-  term current use of insulin    And ----------------------------  Bunionectomy  Cataract extraction w/  intraocular lens implant  Cholecystectomy  Colonoscopy  Colonoscopy  Coronary angioplasty with stent placement      Comment:  x3 stents  Excision of skin cancer (arm)   Injection of anesthetic agent around medial branch nerves innervating   lumbar facet joint      Comment:  Procedure: Block-nerve-medial branch-lumbar;  Surgeon:                Samreen Horne MD;  Location: [facility] OR;  Service: Pain                Management;  Laterality: N/A;  Left L4/L5  Knee arthroscopy  Transforaminal epidural injection of steroid      Comment:  Procedure: INJECTION, STEROID, EPIDURAL, TRANSFORAMINAL                APPROACH Left L5;  Surgeon: Samreen Horne MD;                 Location: [facility] OR;  Service: Pain Management;                 Laterality: Left;  L5  Vasectomy    Presents for right hand tendon transfer      Pre-op Assessment    I have reviewed the NPO Status.      Review of Systems  Cardiovascular:     Hypertension   CAD      Angina           Cardiovascular Symptoms: Angina       Coronary Artery Disease:                            Hypertension         Pulmonary:        Sleep Apnea     Obstructive Sleep Apnea (ARY).           Hepatic/GI:     GERD      Gerd          Neurological:    Neuromuscular Disease,                                  Neuromuscular Disease   Endocrine:  Diabetes Hypothyroidism   Diabetes                   Hypothyroidism              Physical Exam  General: Well nourished, Cooperative, Alert and Oriented    Airway:  Mallampati: III   Mouth Opening: Normal  TM Distance: Normal  Tongue: Normal  Neck ROM: Normal ROM  Neck: Girth Increased  Bearded face  Dental:  Intact    Chest/Lungs:  Clear to auscultation, Normal Respiratory Rate    Heart:  Rate: Normal  Rhythm: Regular Rhythm        Anesthesia Plan  Type of Anesthesia, risks & benefits discussed:    Anesthesia Type: Regional  Intra-op Monitoring Plan: Standard ASA Monitors  Post Op Pain Control Plan: multimodal analgesia, peripheral nerve block and IV/PO Opioids PRN  Induction:  IV  Informed Consent: Informed consent signed with the Patient and all parties understand the risks and agree with anesthesia plan.  All questions answered.   ASA Score: 3  Day of Surgery Review of History & Physical: H&P Update referred to the surgeon/provider.  Anesthesia Plan Notes: Premedication: Midazolam  Regional: Supraclavicular vs interscalene nerve block for surgical anesthesia - Ropiv 0.5% 30mls  Special Technique: Preemptive analgesia with neurontin, zofran, acetaminophen and celebrex or tramadol  Nasal Cannula vs O2 via facemask- consider Supernova Nasal CPAP for obese or ARY patients  PONV prophylaxis  Pt may go directly to Phase 2 if criteria met    Ready For Surgery From Anesthesia Perspective.     .

## 2024-02-23 DIAGNOSIS — E03.9 HYPOTHYROIDISM, UNSPECIFIED TYPE: Chronic | ICD-10-CM

## 2024-02-26 DIAGNOSIS — M66.241 EXTENSOR TENDON RUPTURE, NON-TRAUMATIC, HAND AND WRIST, RIGHT: Primary | ICD-10-CM

## 2024-02-26 DIAGNOSIS — M66.231 EXTENSOR TENDON RUPTURE, NON-TRAUMATIC, HAND AND WRIST, RIGHT: Primary | ICD-10-CM

## 2024-02-26 RX ORDER — LEVOTHYROXINE SODIUM 175 UG/1
175 TABLET ORAL
Qty: 90 TABLET | Refills: 3 | Status: SHIPPED | OUTPATIENT
Start: 2024-02-26

## 2024-02-26 NOTE — OP NOTE
Operative Note    Patient Information:  Darwin Fletcher    Date of Surgery:  02/22/2024    Surgeon:  Javy Price MD    Assistant:  Penny Madsen PA-C  who was necessary and essential for all aspects of the operation, including but not limited to patient positioning, surgical exposure, wound closure, and dressing placement.       Pre-operative Diagnosis:  Right rheumatoid arthritis  Right extensor tendon synovitis of index, middle, ring, little finger extensor tendons  Right index, middle, ring, little finger extensor tendon ruptures    Post-operative Diagnosis:  Same    Procedure Performed:  Radical excision of synovium extensor tendons at wrist CPT 11459  Right flexor digitorum superficialis from middle finger tendon transfer to extensor indices proprius tendon at wrist CPT 71240  Right flexor digitorum superficialis from middle finger tendon transfer to extensor digitorum communis index finger tendon at wrist CPT 23969  Right flexor digitorum superficialis from middle finger tendon transfer to extensor digitorum communis middle finger tendon at wrist CPT 03421  Right flexor digitorum superficialis from ring finger tendon transfer to extensor digitorum communis ring finger tendon at wrist CPT 73967  Right flexor digitorum superficialis from ring finger tendon transfer to extensor digiti minimi tendon at wrist CPT 76685    Anesthesia:  See anesthesia record    Complications:  None    Blood Loss:  See anesthesia record    Specimens:  none    Implants:  * No implants in log *     Indications for Procedure:  Darwin Fletcher is a 73 y.o. male that has extensive inflammatory arthritis with extensor tendon ruptures of the index, middle, ring, small fingers    Risks and benefits of the procedure were discussed with the patient. I explained that surgery and the nature of their condition are not without risks. These include, but are not limited to, bleeding, infection, neurovascular compromise, wound complications,  scarring, cosmetic defects, need for later and/or repeated surgeries, pain, loss of ROM, loss of function, deformity, functional abnormalities, stiffness, thromboembolic complications, compartment syndrome, loss of limb, loss of life, anesthetic complications, and other imponderables. I explained that these can occur despite the adequacy of treatments rendered, and that their risks are heightened given the nature of their condition. They verbalized understanding. They would like to continue with surgery at this time. If appropriate family was involved with surgical discussion. The patient expressed understanding of and agreement with the plan. Informed consent was obtained and signed prior going to the operative room.    Procedure in Detail:  Patient was brought to the operating room by the anesthesia team. Anesthesia was administered per the anesthesia record. The patient was left on the stretcher and a hand table was placed on the side of the bed.     Time out was performed and all parties present agreed with correct patient, correct procedure, correct side, correct site. The operative extremity was prepped and draped in standard normal fashion.     Pre-incision antibiotics were administered prior to skin incision. The tourniquet was inflated to 250mm HG.     I started by making a dorsal longitudinal incision over the wrist.  Dissection was carried down to the extensor retinaculum which was partially released to expose the 4th dorsal compartment.  The extensor tendons were highly synovitic of the 4th dorsal compartment.  A radical extensor synovectomy was performed.  Extensive amount of inflamed synovium was sharply excised with a knife away from the extensor tendons.  The extensor tendons had ruptured at the radiocarpal joint.  All these tendons were released from inflammatory synovium and isolated for tendon transfer.  There were ruptures of the extensor indices proprius tendon, extensor digitorum communis to  index finger, middle finger, ring finger, extensor digiti minimi.    Next I turned my attention to the palmar aspect of the hand.  I addressed the middle finger.  I made a transverse incision over the volar aspect of the proximal interphalangeal joint.  I dissected through subcutaneous tissue to the flexor tendons.  I retracted the flexor digitorum profundus and sharply released the flexor digitorum superficialis insertion.  I then made a counter incision over the A1 pulley in the palm and dissected down to the flexor tendon sheath and pulled the flexor digitorum superficialis tendon out of the wound.  I then made a longitudinal incision in the volar aspect of the forearm and pull of the flexor digitorum superficialis to the middle finger out of the wound.    In a similar fashion I harvested the flexor digitorum superficialis to the ring finger.    I then tunneled both of these tendons in the subcutaneous space and brought them out of the dorsal incision.    I transferred the flexor digitorum superficialis to the middle finger to the extensor indices proprius tendon, extensor digitorum communis tendon to the index finger, extensor digitorum communis tendon to the middle finger using multiple Pulvertaft weaves and fixing them with 3-0 Supramid suture with figure-of-eight sutures.  I then confirmed good tension with wrist tenodesis.    Next I transferred the flexor digitorum superficialis to the ring finger to the extensor digitorum communis to the ring finger and extensor digiti minimi tendons using multiple Pulvertaft weaves and fixing them with 3-0 Supramid suture with figure-of-eight sutures.  I then confirmed good tension with wrist tenodesis.    The wounds were copiously irrigated and closed with 3-0 Monocryl and 3-0 nylon suture.  A sterile dressing was placed with A full finger extension splint    Tourniquet was deflated. Patient was awoken from anesthesia without complications and transferred to the recovery  room in stable condition.       Post-operative Plan:  Patient will start occupational hand therapy on postoperative day 3.  He will get a MP blocking splint with the PIP joints free.  I can see him back in clinic in 2 weeks for wound check.

## 2024-02-26 NOTE — PROGRESS NOTES
Per Dr. Price's op report patient needs a referral for therapy. I routed the referral to [facility].

## 2024-03-13 ENCOUNTER — OFFICE VISIT (OUTPATIENT)
Dept: ORTHOPEDICS | Facility: CLINIC | Age: 74
End: 2024-03-13
Payer: MEDICARE

## 2024-03-13 VITALS
HEIGHT: 69 IN | TEMPERATURE: 98 F | RESPIRATION RATE: 20 BRPM | WEIGHT: 244 LBS | SYSTOLIC BLOOD PRESSURE: 153 MMHG | BODY MASS INDEX: 36.14 KG/M2 | HEART RATE: 62 BPM | OXYGEN SATURATION: 96 % | DIASTOLIC BLOOD PRESSURE: 72 MMHG

## 2024-03-13 DIAGNOSIS — S66.811A EXTENSOR TENDON RUPTURE OF HAND, RIGHT, INITIAL ENCOUNTER: Primary | ICD-10-CM

## 2024-03-13 PROCEDURE — 99214 OFFICE O/P EST MOD 30 MIN: CPT | Mod: PBBFAC

## 2024-03-13 PROCEDURE — 99024 POSTOP FOLLOW-UP VISIT: CPT | Mod: POP,,, | Performed by: STUDENT IN AN ORGANIZED HEALTH CARE EDUCATION/TRAINING PROGRAM

## 2024-03-13 NOTE — PROGRESS NOTES
"Postop Clinic Note    Surgery:  FDS middle and ring to 2-5 extensors transfer 2/22/24    History of present illness:    Patient is doing well. He is doing therapy. No fevers or chills      Past Surgical History:   Procedure Laterality Date    BUNIONECTOMY Left     CATARACT EXTRACTION W/  INTRAOCULAR LENS IMPLANT Bilateral     CHOLECYSTECTOMY      COLONOSCOPY  03/17/2015    COLONOSCOPY Bilateral 07/20/2020    CORONARY ANGIOPLASTY WITH STENT PLACEMENT N/A     x3 stents    Excision of Skin Cancer (Arm)  N/A     INJECTION OF ANESTHETIC AGENT AROUND MEDIAL BRANCH NERVES INNERVATING LUMBAR FACET JOINT N/A 10/26/2022    Procedure: Block-nerve-medial branch-lumbar;  Surgeon: Samreen Horne MD;  Location: [facility] OR;  Service: Pain Management;  Laterality: N/A;  Left L4/L5    KNEE ARTHROSCOPY Left     TRANSFER OF HAND TENDON Right 2/22/2024    Procedure: TRANSFER, TENDON, HAND;  Surgeon: Javy Price MD;  Location: [facility] OR;  Service: Orthopedics;  Laterality: Right;    TRANSFORAMINAL EPIDURAL INJECTION OF STEROID Left 01/05/2023    Procedure: INJECTION, STEROID, EPIDURAL, TRANSFORAMINAL APPROACH Left L5;  Surgeon: Samreen Horne MD;  Location: [facility] OR;  Service: Pain Management;  Laterality: Left;  L5    VASECTOMY N/A            Examination:    Vital Signs:    Vitals:    03/13/24 1252   BP: (!) 153/72   Pulse: 62   Resp: 20   Temp: 98.1 °F (36.7 °C)   SpO2: 96%   Weight: 110.7 kg (244 lb)   Height: 5' 9" (1.753 m)   PainSc:   2       Body mass index is 36.03 kg/m².    Constitution:   Well-developed, well nourished patient in no acute distress.  Neurological:   Alert and oriented x 3 and cooperative to examination.     Psychiatric/Behavior: Normal mental status.  Respiratory:   No shortness of breath.  Eyes:    Extraoccular muscles intact  Skin:    No scars, rash or suspicious lesions.    MSK:   Right hand: surgical incisions are healing. He is able to hold 2-5 digits in extension at the MP joints. He is able to weakly " flex at the PIP joints. The hand is swollen. No drainage or signs of infection. Hand WWP    Imaging:   No new imaging     Assessment: s/p above surgery    Plan:  Continue hand therapy for digit ROM. He can wear his P1 blocking splint at all times except to take it off for controlled ROM exercises. OT 3 times per week. I will see him back in 4 weeks when he will discontinue splint and start activity as tolerated    Follow Up: 4 weeks  Xray at next visit: none

## 2024-03-14 DIAGNOSIS — I10 HYPERTENSION, UNSPECIFIED TYPE: ICD-10-CM

## 2024-03-15 RX ORDER — LISINOPRIL 10 MG/1
10 TABLET ORAL 2 TIMES DAILY
Qty: 90 TABLET | Refills: 1 | Status: SHIPPED | OUTPATIENT
Start: 2024-03-15 | End: 2024-06-03

## 2024-03-20 ENCOUNTER — OFFICE VISIT (OUTPATIENT)
Dept: INTERNAL MEDICINE | Facility: CLINIC | Age: 74
End: 2024-03-20
Payer: MEDICARE

## 2024-03-20 VITALS
WEIGHT: 240 LBS | HEIGHT: 69 IN | HEART RATE: 64 BPM | DIASTOLIC BLOOD PRESSURE: 60 MMHG | SYSTOLIC BLOOD PRESSURE: 120 MMHG | OXYGEN SATURATION: 98 % | BODY MASS INDEX: 35.55 KG/M2

## 2024-03-20 DIAGNOSIS — M19.90 INFLAMMATORY ARTHRITIS: Primary | ICD-10-CM

## 2024-03-20 PROCEDURE — 99214 OFFICE O/P EST MOD 30 MIN: CPT | Mod: ,,, | Performed by: STUDENT IN AN ORGANIZED HEALTH CARE EDUCATION/TRAINING PROGRAM

## 2024-03-20 RX ORDER — FOLIC ACID 1 MG/1
1000 TABLET ORAL DAILY
COMMUNITY
Start: 2024-03-19

## 2024-03-20 RX ORDER — PREDNISONE 20 MG/1
20 TABLET ORAL DAILY
COMMUNITY
Start: 2024-03-19

## 2024-03-20 RX ORDER — METHOTREXATE 2.5 MG/1
2.5 TABLET ORAL
COMMUNITY
Start: 2024-03-19

## 2024-03-21 ENCOUNTER — TELEPHONE (OUTPATIENT)
Dept: INTERNAL MEDICINE | Facility: CLINIC | Age: 74
End: 2024-03-21
Payer: MEDICARE

## 2024-03-21 DIAGNOSIS — E11.42 TYPE 2 DIABETES MELLITUS WITH DIABETIC POLYNEUROPATHY, WITHOUT LONG-TERM CURRENT USE OF INSULIN: Primary | ICD-10-CM

## 2024-03-21 NOTE — ASSESSMENT & PLAN NOTE
Has followed with Rheumatology since last visit   Some improvement in stiffness after taking prednisone prescribed at last visit  Prescribed methotrexate and prednisone by Rheumatologist

## 2024-03-21 NOTE — TELEPHONE ENCOUNTER
----- Message from Nikolai Kwon sent at 3/21/2024  1:42 PM CDT -----  .Type:  Diabetic/Medical Supplies Request    Name of Caller:pt  What supplies are needed:glucose test strips  What is the brand of the supplies:  Refill or New Rx:Refill  If checking glucose, how many times do they check it?:  Who prescribed the original supplies:  Pharmacy/Company Name, Phone #, Location:[id number] AT [place] [address] [address] AT  [[id number]]  Requesting a Call Back?:yes  Would the patient rather a call back or a response via [facility]? Call back  Best Call Back Number:[phone]  Additional Information:

## 2024-03-21 NOTE — TELEPHONE ENCOUNTER
Spoke to patient he uses [employer], was needing to check at least twice daily due to being on Prednisone

## 2024-03-21 NOTE — PROGRESS NOTES
Subjective:      Darwin Fletcher  03/21/2024  [id number]      Chief Complaint: Follow-up (6 week)       HPI:  Mr Granados presents for follow up. Patint had tendon transfer with Ortho since last visit, doing well post op, able to move fingers, continues to wear brace. Patient has seen Rheumatology as well, was prescribed methotrexate and prednisone for RA, has not started taking medications yet.     Past Medical History:   Diagnosis Date    Arthritis     Bulging of lumbar intervertebral disc     CAD (coronary artery disease)     Class 2 severe obesity due to excess calories with serious comorbidity and body mass index (BMI) of 36.0 to 36.9 in adult 07/05/2023    Digestive disorder     Gout 06/06/2022    Hypertension 06/06/2022    Hypothyroidism 06/06/2022    Personal history of colonic polyps     Rheumatoid arthritis     Sleep apnea 06/06/2022    CPAP    Synovitis and tenosynovitis, unspecified     Type 2 diabetes mellitus with diabetic polyneuropathy, without long-term current use of insulin 06/06/2022     Past Surgical History:   Procedure Laterality Date    BUNIONECTOMY Left     CATARACT EXTRACTION W/  INTRAOCULAR LENS IMPLANT Bilateral     CHOLECYSTECTOMY      COLONOSCOPY  03/17/2015    COLONOSCOPY Bilateral 07/20/2020    CORONARY ANGIOPLASTY WITH STENT PLACEMENT N/A     x3 stents    Excision of Skin Cancer (Arm)  N/A     INJECTION OF ANESTHETIC AGENT AROUND MEDIAL BRANCH NERVES INNERVATING LUMBAR FACET JOINT N/A 10/26/2022    Procedure: Block-nerve-medial branch-lumbar;  Surgeon: Samreen Horne MD;  Location: [facility];  Service: Pain Management;  Laterality: N/A;  Left L4/L5    KNEE ARTHROSCOPY Left     TRANSFER OF HAND TENDON Right 2/22/2024    Procedure: TRANSFER, TENDON, HAND;  Surgeon: Javy Price MD;  [facility] OR;  Service: Orthopedics;  Laterality: Right;    TRANSFORAMINAL EPIDURAL INJECTION OF STEROID Left 01/05/2023    Procedure: INJECTION, STEROID, EPIDURAL, TRANSFORAMINAL APPROACH Left L5;   Surgeon: Samreen Horne MD;  Location: [facility];  Service: Pain Management;  Laterality: Left;  L5    VASECTOMY N/A      Family History   Problem Relation Age of Onset    Heart attack Mother     Lung cancer Father     Cancer Father      Social History     Tobacco Use    Smoking status: Former     Current packs/day: 2.00     Average packs/day: 2.0 packs/day for 35.0 years (70.0 ttl pk-yrs)     Types: Cigarettes    Smokeless tobacco: Never   Substance and Sexual Activity    Alcohol use: Yes     Alcohol/week: 9.0 standard drinks of alcohol     Types: 2 Glasses of wine, 2 Cans of beer, 3 Shots of liquor, 2 Drinks containing 0.5 oz of alcohol per week     Comment: twice weekly    Drug use: Never    Sexual activity: Yes     Partners: Female     Review of patient's allergies indicates:  No Known Allergies    The following were reviewed at this visit: active problem list, medication list, allergies, family history, social history, and health maintenance.    Medications:    Current Outpatient Medications:     allopurinoL (ZYLOPRIM) 300 MG tablet, Take 1 tablet by mouth once daily., Disp: 90 tablet, Rfl: 3    amLODIPine (NORVASC) 10 MG tablet, Take 1 tablet by mouth once daily., Disp: 90 tablet, Rfl: 3    aspirin 81 mg Cap, Take 1 tablet by mouth 2 (two) times a day., Disp: , Rfl:     diclofenac (VOLTAREN) 75 MG EC tablet, Take 1 tablet (75 mg total) by mouth Daily., Disp: 90 tablet, Rfl: 3    fenofibrate 160 MG Tab, Take 1 tablet by mouth once daily., Disp: 90 tablet, Rfl: 3    folic acid (FOLVITE) 1 MG tablet, Take 1,000 mcg by mouth once daily., Disp: , Rfl:     glimepiride (AMARYL) 1 MG tablet, Take 1 tablet (1 mg total) by mouth 2 (two) times daily., Disp: 180 tablet, Rfl: 2    glucosamine-chondroitin 250-200 mg Tab, Take 1 tablet by mouth Daily., Disp: , Rfl:     iron-vit c-b12-folic acid (IRON 100 PLUS) Tab, 1 tablet Daily., Disp: , Rfl:     levothyroxine (SYNTHROID, LEVOTHROID) 175 MCG tablet, Take 1 tablet by mouth  before breakfast., Disp: 90 tablet, Rfl: 3    lisinopriL 10 MG tablet, Take 1 tablet by mouth twice daily., Disp: 90 tablet, Rfl: 1    metFORMIN (GLUCOPHAGE) 500 MG tablet, Take 1/2 tablet bid (Patient taking differently: Take 250 mg by mouth 2 (two) times daily with meals. Take 1/2 tablet bid), Disp: 90 tablet, Rfl: 2    methotrexate 2.5 MG Tab, Take 2.5 mg by mouth every 7 days., Disp: , Rfl:     predniSONE (DELTASONE) 20 MG tablet, Take 20 mg by mouth once daily., Disp: , Rfl:     pyridoxine, vitamin B6, (VITAMIN B-6) 50 MG Tab, Take 50 mg by mouth once daily., Disp: , Rfl:     thiamine (VITAMIN B-1) 100 MG tablet, Take 100 mg by mouth once daily., Disp: , Rfl:     traZODone (DESYREL) 50 MG tablet, Take 1 tablet by mouth every evening., Disp: 90 tablet, Rfl: 3    HYDROcodone-acetaminophen (NORCO) 5-325 mg per tablet, Take 1 tablet by mouth every 4 (four) hours as needed for Pain. (Patient not taking: Reported on 3/20/2024), Disp: 28 tablet, Rfl: 0  No current facility-administered medications for this visit.    Facility-Administered Medications Ordered in Other Visits:     acetaminophen tablet 1,000 mg, 1,000 mg, Oral, On Call Procedure, Edyta Zaidi MD, 1,000 mg at 02/22/24 0818    celecoxib capsule 200 mg, 200 mg, Oral, On Call Procedure, Edyta Zaidi MD, 200 mg at 02/22/24 0819    electrolyte-A infusion, , Intravenous, Continuous, dEyta Zaidi MD, Last Rate: 10 mL/hr at 02/22/24 0819, New Bag at 02/22/24 0819    HYDROcodone-acetaminophen 5-325 mg per tablet 1 tablet, 1 tablet, Oral, Q3H PRN, Edyta Zaidi MD    HYDROmorphone (PF) injection 0.4 mg, 0.4 mg, Intravenous, Q5 Min PRN, Edyta Zaidi MD    midazolam (VERSED) 1 mg/mL injection 2 mg, 2 mg, Intravenous, On Call Procedure, Edyta Zaidi MD, 2 mg at 02/22/24 0840    ondansetron injection 4 mg, 4 mg, Intravenous, Daily PRN, Edyta Zaidi MD    prochlorperazine injection Soln 5 mg, 5 mg, Intravenous, Q30 Min PRN,  "Edyta Zaidi MD      Medications have been reviewed and reconciled with patient at this visit.  Barriers to medications reviewed with patient.    Adverse reactions to current medications reviewed with patient..    Over the counter medications reviewed and reconciled with patient.  Review of Systems   Constitutional:  Negative for chills, diaphoresis, fever and weight loss.   HENT:  Negative for congestion, ear discharge, sinus pain and sore throat.    Eyes:  Negative for photophobia.   Respiratory:  Negative for cough, hemoptysis and shortness of breath.    Cardiovascular:  Negative for chest pain, palpitations, claudication, leg swelling and PND.   Gastrointestinal:  Negative for constipation, diarrhea, nausea and vomiting.   Genitourinary:  Negative for dysuria, frequency and urgency.   Musculoskeletal:  Negative for back pain, joint pain, myalgias and neck pain.   Skin:  Negative for itching and rash.   Neurological:  Negative for dizziness, focal weakness and headaches.   Psychiatric/Behavioral:  Negative for depression. The patient does not have insomnia.            Objective:      Vitals:    03/20/24 1107   BP: 120/60   Pulse: 64   SpO2: 98%   Weight: 108.9 kg (240 lb)   Height: 5' 9" (1.753 m)       Physical Exam  Constitutional:       Appearance: Normal appearance.   HENT:      Head: Normocephalic and atraumatic.   Cardiovascular:      Rate and Rhythm: Normal rate and regular rhythm.      Pulses: Normal pulses.   Pulmonary:      Effort: Pulmonary effort is normal.      Breath sounds: Normal breath sounds.   Abdominal:      General: Abdomen is flat. Bowel sounds are normal. There is no distension.      Palpations: Abdomen is soft.      Tenderness: There is no abdominal tenderness.   Musculoskeletal:         General: No tenderness. Normal range of motion.      Right lower leg: No edema.      Left lower leg: No edema.   Lymphadenopathy:      Cervical: No cervical adenopathy.   Neurological:      General: " No focal deficit present.      Mental Status: He is alert and oriented to person, place, and time.               Assessment and Plan:       1. Inflammatory arthritis  Assessment & Plan:  Has followed with Rheumatology since last visit   Some improvement in stiffness after taking prednisone prescribed at last visit  Prescribed methotrexate and prednisone by Rheumatologist                 Follow up: Follow up in about 6 months (around 9/20/2024) for Bp follow up.

## 2024-04-24 ENCOUNTER — OFFICE VISIT (OUTPATIENT)
Dept: ORTHOPEDICS | Facility: CLINIC | Age: 74
End: 2024-04-24
Payer: MEDICARE

## 2024-04-24 VITALS
DIASTOLIC BLOOD PRESSURE: 66 MMHG | HEIGHT: 69 IN | BODY MASS INDEX: 35.84 KG/M2 | HEART RATE: 67 BPM | SYSTOLIC BLOOD PRESSURE: 147 MMHG | WEIGHT: 242 LBS

## 2024-04-24 DIAGNOSIS — M66.241 EXTENSOR TENDON RUPTURE, NON-TRAUMATIC, HAND AND WRIST, RIGHT: Primary | ICD-10-CM

## 2024-04-24 DIAGNOSIS — M66.231 EXTENSOR TENDON RUPTURE, NON-TRAUMATIC, HAND AND WRIST, RIGHT: Primary | ICD-10-CM

## 2024-04-24 PROCEDURE — 99024 POSTOP FOLLOW-UP VISIT: CPT | Mod: POP,,, | Performed by: STUDENT IN AN ORGANIZED HEALTH CARE EDUCATION/TRAINING PROGRAM

## 2024-04-24 NOTE — PROGRESS NOTES
"Postop Clinic Note    Surgery:  FDS middle and ring to 2-5 extensors transfer 2/22/24    History of present illness:    Patient is doing well.  He continues to improve with therapy.  He is now starting to use his hand much more than he was able to do.  He is doing therapy twice a week.  He wears his brace most of the time.  He is now 8 weeks out from this surgery      Past Surgical History:   Procedure Laterality Date    BUNIONECTOMY Left     CATARACT EXTRACTION W/  INTRAOCULAR LENS IMPLANT Bilateral     CHOLECYSTECTOMY      COLONOSCOPY  03/17/2015    COLONOSCOPY Bilateral 07/20/2020    CORONARY ANGIOPLASTY WITH STENT PLACEMENT N/A     x3 stents    Excision of Skin Cancer (Arm)  N/A     INJECTION OF ANESTHETIC AGENT AROUND MEDIAL BRANCH NERVES INNERVATING LUMBAR FACET JOINT N/A 10/26/2022    Procedure: Block-nerve-medial branch-lumbar;  Surgeon: Samreen Horne MD;  Location: [facility] OR;  Service: Pain Management;  Laterality: N/A;  Left L4/L5    KNEE ARTHROSCOPY Left     TRANSFER OF HAND TENDON Right 2/22/2024    Procedure: TRANSFER, TENDON, HAND;  Surgeon: Javy Price MD;  Location: [facility];  Service: Orthopedics;  Laterality: Right;    TRANSFORAMINAL EPIDURAL INJECTION OF STEROID Left 01/05/2023    Procedure: INJECTION, STEROID, EPIDURAL, TRANSFORAMINAL APPROACH Left L5;  Surgeon: Samreen Horne MD;  Location: [facility];  Service: Pain Management;  Laterality: Left;  L5    VASECTOMY N/A            Examination:    Vital Signs:    Vitals:    04/24/24 1337   BP: (!) 147/66   Pulse: 67   Weight: 109.8 kg (242 lb)   Height: 5' 9" (1.753 m)   PainSc:   2       Body mass index is 35.74 kg/m².    Constitution:   Well-developed, well nourished patient in no acute distress.  Neurological:   Alert and oriented x 3 and cooperative to examination.     Psychiatric/Behavior: Normal mental status.  Respiratory:   No shortness of breath.  Eyes:    Extraoccular muscles intact  Skin:    No scars, rash or suspicious " lesions.    MSK:   Right hand:  Surgical incisions are healed.  He has a 45 degree extension lag of the index, middle, ring, small fingers.  He is able to flex the index, middle, little finger to 1 cm away from the distal palmar crease.  The ring finger he can flex to 2 cm away from the distal palmar crease.  He is neurovascularly intact in his hand is warm well perfused            Imaging:   No new imaging     Assessment: s/p above surgery    Plan:  He still has an extensor lag of the digits.  He was happy with his result although I am hoping that with therapy he will be able to get these fingers out of the palm even further.  At this point he can start more aggressively working on therapy and perform activity as tolerated with this hand.  He can continue occupational therapy and I can see him back in the next 2 months to see how he is doing    Follow Up:  2 months  Xray at next visit: none

## 2024-06-03 DIAGNOSIS — I10 HYPERTENSION, UNSPECIFIED TYPE: ICD-10-CM

## 2024-06-03 RX ORDER — LISINOPRIL 10 MG/1
10 TABLET ORAL 2 TIMES DAILY
Qty: 90 TABLET | Refills: 3 | Status: SHIPPED | OUTPATIENT
Start: 2024-06-03

## 2024-07-01 ENCOUNTER — OFFICE VISIT (OUTPATIENT)
Dept: ORTHOPEDICS | Facility: CLINIC | Age: 74
End: 2024-07-01
Payer: MEDICARE

## 2024-07-01 VITALS
BODY MASS INDEX: 35.52 KG/M2 | SYSTOLIC BLOOD PRESSURE: 147 MMHG | HEART RATE: 64 BPM | WEIGHT: 239.81 LBS | DIASTOLIC BLOOD PRESSURE: 69 MMHG | HEIGHT: 69 IN

## 2024-07-01 DIAGNOSIS — M66.241 EXTENSOR TENDON RUPTURE, NON-TRAUMATIC, HAND AND WRIST, RIGHT: Primary | ICD-10-CM

## 2024-07-01 DIAGNOSIS — M66.231 EXTENSOR TENDON RUPTURE, NON-TRAUMATIC, HAND AND WRIST, RIGHT: Primary | ICD-10-CM

## 2024-07-01 PROCEDURE — 99213 OFFICE O/P EST LOW 20 MIN: CPT | Mod: ,,, | Performed by: STUDENT IN AN ORGANIZED HEALTH CARE EDUCATION/TRAINING PROGRAM

## 2024-07-01 RX ORDER — ETANERCEPT 50 MG/ML
SOLUTION SUBCUTANEOUS
COMMUNITY
Start: 2024-06-17

## 2024-07-01 NOTE — PROGRESS NOTES
"Postop Clinic Note    Surgery:  FDS middle and ring to 2-5 extensors transfer 2/22/24    History of present illness:    Patient is doing well.  He continues to improve with therapy.  He is now starting to use his hand much more than he was able to do.  He has now completed therapy.  He was able to use his hand for everything he wants to do in his life.  He saw a rheumatologist and has been started on Enbrel      Past Surgical History:   Procedure Laterality Date    BUNIONECTOMY Left     CATARACT EXTRACTION W/  INTRAOCULAR LENS IMPLANT Bilateral     CHOLECYSTECTOMY      COLONOSCOPY  03/17/2015    COLONOSCOPY Bilateral 07/20/2020    CORONARY ANGIOPLASTY WITH STENT PLACEMENT N/A     x3 stents    Excision of Skin Cancer (Arm)  N/A     INJECTION OF ANESTHETIC AGENT AROUND MEDIAL BRANCH NERVES INNERVATING LUMBAR FACET JOINT N/A 10/26/2022    Procedure: Block-nerve-medial branch-lumbar;  Surgeon: Samreen Horne MD;  Location: [facility] OR;  Service: Pain Management;  Laterality: N/A;  Left L4/L5    KNEE ARTHROSCOPY Left     TRANSFER OF HAND TENDON Right 2/22/2024    Procedure: TRANSFER, TENDON, HAND;  Surgeon: Javy Price MD;  Location: [facility] OR;  Service: Orthopedics;  Laterality: Right;    TRANSFORAMINAL EPIDURAL INJECTION OF STEROID Left 01/05/2023    Procedure: INJECTION, STEROID, EPIDURAL, TRANSFORAMINAL APPROACH Left L5;  Surgeon: Samreen Horne MD;  Location: [facility];  Service: Pain Management;  Laterality: Left;  L5    VASECTOMY N/A            Examination:    Vital Signs:    Vitals:    07/01/24 0857   BP: (!) 147/69   Pulse: 64   Weight: 108.8 kg (239 lb 12.8 oz)   Height: 5' 9" (1.753 m)       Body mass index is 35.41 kg/m².    Constitution:   Well-developed, well nourished patient in no acute distress.  Neurological:   Alert and oriented x 3 and cooperative to examination.     Psychiatric/Behavior: Normal mental status.  Respiratory:   No shortness of breath.  Eyes:    Extraoccular muscles intact  Skin:    No " scars, rash or suspicious lesions.    MSK:   Right hand:  Surgical incisions are healed.  He has a 45 degree extension lag of the index, middle, ring, small fingers.  He is able to flex the index, middle, little finger to 1 cm away from the distal palmar crease.  The ring finger he can flex to 2 cm away from the distal palmar crease.  He is neurovascularly intact in his hand is warm well perfused      Imaging:   No new imaging     Assessment: s/p above surgery    Plan:  He was light extensor lag but this does not bother him.  He has returned to full activity.  He can continue full activity as tolerated and follow up with me as needed.  I am moving to [place] so I will not be able to follow him up however we will get him set up with somebody else in our office if he has any issues.      Follow Up:  As needed  Xray at next visit: none

## 2024-08-22 DIAGNOSIS — E11.42 TYPE 2 DIABETES MELLITUS WITH DIABETIC POLYNEUROPATHY, WITHOUT LONG-TERM CURRENT USE OF INSULIN: Chronic | ICD-10-CM

## 2024-08-22 RX ORDER — METFORMIN HYDROCHLORIDE 500 MG/1
250 TABLET ORAL 2 TIMES DAILY WITH MEALS
Qty: 180 TABLET | Refills: 2 | Status: SHIPPED | OUTPATIENT
Start: 2024-08-22

## 2024-08-26 ENCOUNTER — TELEPHONE (OUTPATIENT)
Dept: INTERNAL MEDICINE | Facility: CLINIC | Age: 74
End: 2024-08-26
Payer: MEDICARE

## 2024-08-26 ENCOUNTER — PATIENT OUTREACH (OUTPATIENT)
Facility: CLINIC | Age: 74
End: 2024-08-26
Payer: MEDICARE

## 2024-08-26 DIAGNOSIS — E11.42 TYPE 2 DIABETES MELLITUS WITH DIABETIC POLYNEUROPATHY, WITHOUT LONG-TERM CURRENT USE OF INSULIN: Primary | Chronic | ICD-10-CM

## 2024-08-26 NOTE — PROGRESS NOTES
Health Maintenance Topic(s) Outreach Outcomes & Actions Taken:    Lab(s) - Outreach Outcomes & Actions Taken  : Overdue Lab(s) Ordered, Patient Declined Scheduling Labs or Will Call Back to Schedule, and hgb aic ordered    Medication Adherence / Statins - Outreach Outcomes & Actions Taken  : states he is on Rx Fenofibrate per Dr Vega    Blood Pressure - Outreach Outcomes & Actions Taken  : Primary Care Follow Up Visit Scheduled  and Patient has scheduled f/u on 9/20/2024 and states it has been good at home 140/70s     Primary Care Appt - Outreach Outcomes & Actions Taken  : Primary Care Appt Scheduled and Patient has f/u with Primary Care Dr on 9/20/2024                              Additional Notes:           Care Management, Digital Medicine, and/or Education Referrals      Next Steps - Referral Actions: Digital Medicine Outcomes and Actions Taken: not eligible but is also followed by Cardio Dr Vega and No referrals sent

## 2024-08-26 NOTE — TELEPHONE ENCOUNTER
----- Message from Yoli Sanchez LPN sent at 8/26/2024  9:01 AM CDT -----  Regarding: I ordered Hgb A1c for patient  Kit Hampton     I did Value based outreach on this Patient MRN# [id number] and ordered Hgb A1c that was due in Health Maintenance if you want to add more .    Thank you Berta Kent CCC

## 2024-08-28 DIAGNOSIS — E78.5 HYPERLIPIDEMIA, UNSPECIFIED HYPERLIPIDEMIA TYPE: ICD-10-CM

## 2024-08-28 RX ORDER — FENOFIBRATE 160 MG/1
160 TABLET ORAL
Qty: 90 TABLET | Refills: 2 | Status: SHIPPED | OUTPATIENT
Start: 2024-08-28

## 2024-08-30 DIAGNOSIS — E11.42 TYPE 2 DIABETES MELLITUS WITH DIABETIC POLYNEUROPATHY, WITHOUT LONG-TERM CURRENT USE OF INSULIN: ICD-10-CM

## 2024-08-30 RX ORDER — GLIMEPIRIDE 1 MG/1
1 TABLET ORAL 2 TIMES DAILY
Qty: 180 TABLET | Refills: 2 | Status: SHIPPED | OUTPATIENT
Start: 2024-08-30

## 2024-09-13 ENCOUNTER — TELEPHONE (OUTPATIENT)
Dept: INTERNAL MEDICINE | Facility: CLINIC | Age: 74
End: 2024-09-13
Payer: MEDICARE

## 2024-09-17 ENCOUNTER — LAB VISIT (OUTPATIENT)
Dept: LAB | Facility: HOSPITAL | Age: 74
End: 2024-09-17
Attending: STUDENT IN AN ORGANIZED HEALTH CARE EDUCATION/TRAINING PROGRAM
Payer: MEDICARE

## 2024-09-17 DIAGNOSIS — E11.42 TYPE 2 DIABETES MELLITUS WITH DIABETIC POLYNEUROPATHY, WITHOUT LONG-TERM CURRENT USE OF INSULIN: Chronic | ICD-10-CM

## 2024-09-17 LAB
EST. AVERAGE GLUCOSE BLD GHB EST-MCNC: 91.1 MG/DL
HBA1C MFR BLD: 4.8 %

## 2024-09-17 PROCEDURE — 36415 COLL VENOUS BLD VENIPUNCTURE: CPT

## 2024-09-17 PROCEDURE — 83036 HEMOGLOBIN GLYCOSYLATED A1C: CPT

## 2024-09-20 ENCOUNTER — OFFICE VISIT (OUTPATIENT)
Dept: INTERNAL MEDICINE | Facility: CLINIC | Age: 74
End: 2024-09-20
Payer: MEDICARE

## 2024-09-20 VITALS
HEART RATE: 93 BPM | HEIGHT: 69 IN | TEMPERATURE: 98 F | DIASTOLIC BLOOD PRESSURE: 65 MMHG | WEIGHT: 239 LBS | OXYGEN SATURATION: 60 % | SYSTOLIC BLOOD PRESSURE: 103 MMHG | BODY MASS INDEX: 35.4 KG/M2

## 2024-09-20 DIAGNOSIS — D50.8 IRON DEFICIENCY ANEMIA SECONDARY TO INADEQUATE DIETARY IRON INTAKE: ICD-10-CM

## 2024-09-20 DIAGNOSIS — E03.9 HYPOTHYROIDISM, UNSPECIFIED TYPE: ICD-10-CM

## 2024-09-20 DIAGNOSIS — E11.42 TYPE 2 DIABETES MELLITUS WITH DIABETIC POLYNEUROPATHY, WITHOUT LONG-TERM CURRENT USE OF INSULIN: ICD-10-CM

## 2024-09-20 DIAGNOSIS — Z00.00 WELL ADULT EXAM: ICD-10-CM

## 2024-09-20 DIAGNOSIS — Z12.5 SCREENING PSA (PROSTATE SPECIFIC ANTIGEN): ICD-10-CM

## 2024-09-20 DIAGNOSIS — E78.5 HYPERLIPIDEMIA, UNSPECIFIED HYPERLIPIDEMIA TYPE: ICD-10-CM

## 2024-09-20 DIAGNOSIS — I10 HYPERTENSION, UNSPECIFIED TYPE: ICD-10-CM

## 2024-09-20 PROBLEM — L97.521 NON-PRESSURE CHRONIC ULCER OF OTHER PART OF LEFT FOOT LIMITED TO BREAKDOWN OF SKIN: Status: RESOLVED | Noted: 2024-02-05 | Resolved: 2024-09-20

## 2024-10-05 NOTE — PROGRESS NOTES
Subjective:      Darwin Fletcher  10/04/2024  [id number]      Chief Complaint: Follow-up (6 month) and Foot exam       HPI:  Mr Granados presents for 6 months follow up of diabetes. Patient does not have new complaints, diabetes is well controlled.     Past Medical History:   Diagnosis Date    Arthritis     Bulging of lumbar intervertebral disc     CAD (coronary artery disease)     Class 2 severe obesity due to excess calories with serious comorbidity and body mass index (BMI) of 36.0 to 36.9 in adult 07/05/2023    Digestive disorder     Gout 06/06/2022    Hypertension 06/06/2022    Hypothyroidism 06/06/2022    Personal history of colonic polyps     Rheumatoid arthritis     Sleep apnea 06/06/2022    CPAP    Synovitis and tenosynovitis, unspecified     Type 2 diabetes mellitus with diabetic polyneuropathy, without long-term current use of insulin 06/06/2022     Past Surgical History:   Procedure Laterality Date    BUNIONECTOMY Left     CATARACT EXTRACTION W/  INTRAOCULAR LENS IMPLANT Bilateral     CHOLECYSTECTOMY      COLONOSCOPY  03/17/2015    COLONOSCOPY Bilateral 07/20/2020    CORONARY ANGIOPLASTY WITH STENT PLACEMENT N/A     x3 stents    Excision of Skin Cancer (Arm)  N/A     EYE SURGERY      INJECTION OF ANESTHETIC AGENT AROUND MEDIAL BRANCH NERVES INNERVATING LUMBAR FACET JOINT N/A 10/26/2022    Procedure: Block-nerve-medial branch-lumbar;  Surgeon: Samreen Horne MD;  Location: [facility] OR;  Service: Pain Management;  Laterality: N/A;  Left L4/L5    KNEE ARTHROSCOPY Left     TRANSFER OF HAND TENDON Right 02/22/2024    Procedure: TRANSFER, TENDON, HAND;  Surgeon: Javy Price MD;  Location: [facility] OR;  Service: Orthopedics;  Laterality: Right;    TRANSFORAMINAL EPIDURAL INJECTION OF STEROID Left 01/05/2023    Procedure: INJECTION, STEROID, EPIDURAL, TRANSFORAMINAL APPROACH Left L5;  Surgeon: Samreen Horne MD;  Location: [facility] OR;  Service: Pain Management;  Laterality: Left;  L5    VASECTOMY N/A      Family  History   Problem Relation Name Age of Onset    Heart attack Mother      Lung cancer Father RUBI Fletcher     Cancer Father RUBI Fletcher      Social History     Tobacco Use    Smoking status: Former     Current packs/day: 2.00     Average packs/day: 2.0 packs/day for 35.0 years (70.0 ttl pk-yrs)     Types: Cigarettes    Smokeless tobacco: Never   Substance and Sexual Activity    Alcohol use: Yes     Alcohol/week: 9.0 standard drinks of alcohol     Types: 2 Glasses of wine, 2 Cans of beer, 3 Shots of liquor, 2 Drinks containing 0.5 oz of alcohol per week     Comment: twice weekly    Drug use: Never    Sexual activity: Yes     Partners: Female     Review of patient's allergies indicates:  No Known Allergies    The following were reviewed at this visit: active problem list, medication list, allergies, family history, social history, and health maintenance.    Medications:    Current Outpatient Medications:     allopurinoL (ZYLOPRIM) 300 MG tablet, Take 1 tablet by mouth once daily., Disp: 90 tablet, Rfl: 3    amLODIPine (NORVASC) 10 MG tablet, Take 1 tablet by mouth once daily., Disp: 90 tablet, Rfl: 3    aspirin 81 mg Cap, Take 1 tablet by mouth 2 (two) times a day., Disp: , Rfl:     blood sugar diagnostic (TRUE METRIX GLUCOSE TEST STRIP) Strp, USE TO CHECK CBG TWICE DAILY, Disp: 200 each, Rfl: 1    diclofenac (VOLTAREN) 75 MG EC tablet, Take 1 tablet (75 mg total) by mouth Daily., Disp: 90 tablet, Rfl: 3    ENBREL SURECLICK 50 mg/mL (1 mL) PnIj, Inject into the skin every 7 days., Disp: , Rfl:     fenofibrate 160 MG Tab, Take 1 tablet by mouth once daily., Disp: 90 tablet, Rfl: 2    folic acid (FOLVITE) 1 MG tablet, Take 1,000 mcg by mouth once daily., Disp: , Rfl:     iron-vit c-b12-folic acid (IRON 100 PLUS) Tab, 1 tablet Daily., Disp: , Rfl:     levothyroxine (SYNTHROID, LEVOTHROID) 175 MCG tablet, Take 1 tablet by mouth before breakfast., Disp: 90 tablet, Rfl: 3    lisinopriL 10 MG tablet, Take 1 tablet by mouth  twice daily., Disp: 90 tablet, Rfl: 3    metFORMIN (GLUCOPHAGE) 500 MG tablet, Take 0.5 tablets (250 mg total) by mouth 2 (two) times daily with meals. Take 1/2 tablet bid, Disp: 180 tablet, Rfl: 2    methotrexate 2.5 MG Tab, Take 2.5 mg by mouth every 7 days., Disp: , Rfl:     pyridoxine, vitamin B6, (VITAMIN B-6) 50 MG Tab, Take 50 mg by mouth once daily., Disp: , Rfl:     thiamine (VITAMIN B-1) 100 MG tablet, Take 100 mg by mouth once daily., Disp: , Rfl:     traZODone (DESYREL) 50 MG tablet, Take 1 tablet by mouth every evening., Disp: 90 tablet, Rfl: 3  No current facility-administered medications for this visit.    Facility-Administered Medications Ordered in Other Visits:     acetaminophen tablet 1,000 mg, 1,000 mg, Oral, On Call Procedure, Edyta Zaidi MD, 1,000 mg at 02/22/24 0818    celecoxib capsule 200 mg, 200 mg, Oral, On Call Procedure, Edyta Zaidi MD, 200 mg at 02/22/24 0819    HYDROcodone-acetaminophen 5-325 mg per tablet 1 tablet, 1 tablet, Oral, Q3H PRN, Edyta Zaidi MD    HYDROmorphone (PF) injection 0.4 mg, 0.4 mg, Intravenous, Q5 Min PRN, Edyta Zaidi MD    midazolam (VERSED) 1 mg/mL injection 2 mg, 2 mg, Intravenous, On Call Procedure, Edyta Zaidi MD, 2 mg at 02/22/24 0840    ondansetron injection 4 mg, 4 mg, Intravenous, Daily PRN, Edyta Zaidi MD    prochlorperazine injection Soln 5 mg, 5 mg, Intravenous, Q30 Min PRN, Edyta Zaidi MD      Medications have been reviewed and reconciled with patient at this visit.  Barriers to medications reviewed with patient.    Adverse reactions to current medications reviewed with patient..    Over the counter medications reviewed and reconciled with patient.  Review of Systems   Constitutional:  Negative for chills, diaphoresis, fever and weight loss.   HENT:  Negative for congestion, ear discharge, sinus pain and sore throat.    Eyes:  Negative for photophobia.   Respiratory:  Negative for cough,  "hemoptysis and shortness of breath.    Cardiovascular:  Negative for chest pain, palpitations, claudication, leg swelling and PND.   Gastrointestinal:  Negative for constipation, diarrhea, nausea and vomiting.   Genitourinary:  Negative for dysuria, frequency and urgency.   Musculoskeletal:  Negative for back pain, joint pain, myalgias and neck pain.   Skin:  Negative for itching and rash.   Neurological:  Negative for dizziness, focal weakness and headaches.   Psychiatric/Behavioral:  Negative for depression. The patient does not have insomnia.            Objective:      Vitals:    09/20/24 0949   BP: 103/65   BP Location: Left arm   Pulse: 93   Temp: 97.7 °F (36.5 °C)   SpO2: (!) 60%   Weight: 108.4 kg (239 lb)   Height: 5' 9" (1.753 m)       Physical Exam  Constitutional:       Appearance: Normal appearance.   HENT:      Head: Normocephalic and atraumatic.   Cardiovascular:      Rate and Rhythm: Normal rate and regular rhythm.      Pulses: Normal pulses.           Dorsalis pedis pulses are 2+ on the right side and 2+ on the left side.   Pulmonary:      Effort: Pulmonary effort is normal.      Breath sounds: Normal breath sounds.   Abdominal:      General: Abdomen is flat. Bowel sounds are normal. There is no distension.      Palpations: Abdomen is soft.      Tenderness: There is no abdominal tenderness.   Musculoskeletal:         General: No tenderness. Normal range of motion.      Right lower leg: No edema.      Left lower leg: No edema.   Feet:      Right foot:      Protective Sensation: 5 sites tested.  5 sites sensed.      Skin integrity: Skin integrity normal.      Left foot:      Protective Sensation: 5 sites tested.  5 sites sensed.      Skin integrity: Skin integrity normal.   Lymphadenopathy:      Cervical: No cervical adenopathy.   Neurological:      General: No focal deficit present.      Mental Status: He is alert and oriented to person, place, and time.               Assessment and Plan:       1. " Type 2 diabetes mellitus with diabetic polyneuropathy, without long-term current use of insulin  Assessment & Plan:  A1C 4.8, controlled  Continue metformin   Foot exam performed today with no findings     Orders:  -     CBC Auto Differential; Future; Expected date: 03/20/2025  -     Comprehensive Metabolic Panel; Future; Expected date: 03/20/2025  -     Hemoglobin A1C; Future; Expected date: 03/20/2025  -     Lipid Panel; Future; Expected date: 03/20/2025  -     Microalbumin/Creatinine Ratio, Urine; Future; Expected date: 03/20/2025  -     PSA, Screening; Future; Expected date: 03/20/2025  -     TSH; Future; Expected date: 03/20/2025  -     Iron and TIBC; Future; Expected date: 03/20/2025  -     Ferritin; Future; Expected date: 03/20/2025    2. Hyperlipidemia, unspecified hyperlipidemia type  Assessment & Plan:  Stable   Continue fenofibrate      Orders:  -     CBC Auto Differential; Future; Expected date: 03/20/2025  -     Comprehensive Metabolic Panel; Future; Expected date: 03/20/2025  -     Hemoglobin A1C; Future; Expected date: 03/20/2025  -     Lipid Panel; Future; Expected date: 03/20/2025  -     Microalbumin/Creatinine Ratio, Urine; Future; Expected date: 03/20/2025  -     PSA, Screening; Future; Expected date: 03/20/2025  -     TSH; Future; Expected date: 03/20/2025  -     Iron and TIBC; Future; Expected date: 03/20/2025  -     Ferritin; Future; Expected date: 03/20/2025    3. Hypertension, unspecified type  Assessment & Plan:  Controlled  Continue amlodipine     Orders:  -     CBC Auto Differential; Future; Expected date: 03/20/2025  -     Comprehensive Metabolic Panel; Future; Expected date: 03/20/2025  -     Hemoglobin A1C; Future; Expected date: 03/20/2025  -     Lipid Panel; Future; Expected date: 03/20/2025  -     Microalbumin/Creatinine Ratio, Urine; Future; Expected date: 03/20/2025  -     PSA, Screening; Future; Expected date: 03/20/2025  -     TSH; Future; Expected date: 03/20/2025  -     Iron  and TIBC; Future; Expected date: 03/20/2025  -     Ferritin; Future; Expected date: 03/20/2025    4. Hypothyroidism, unspecified type  Assessment & Plan:  Continue levothyroxine     Orders:  -     CBC Auto Differential; Future; Expected date: 03/20/2025  -     Comprehensive Metabolic Panel; Future; Expected date: 03/20/2025  -     Hemoglobin A1C; Future; Expected date: 03/20/2025  -     Lipid Panel; Future; Expected date: 03/20/2025  -     Microalbumin/Creatinine Ratio, Urine; Future; Expected date: 03/20/2025  -     PSA, Screening; Future; Expected date: 03/20/2025  -     TSH; Future; Expected date: 03/20/2025  -     Iron and TIBC; Future; Expected date: 03/20/2025  -     Ferritin; Future; Expected date: 03/20/2025    5. Iron deficiency anemia secondary to inadequate dietary iron intake  -     CBC Auto Differential; Future; Expected date: 03/20/2025  -     Comprehensive Metabolic Panel; Future; Expected date: 03/20/2025  -     Hemoglobin A1C; Future; Expected date: 03/20/2025  -     Lipid Panel; Future; Expected date: 03/20/2025  -     Microalbumin/Creatinine Ratio, Urine; Future; Expected date: 03/20/2025  -     PSA, Screening; Future; Expected date: 03/20/2025  -     TSH; Future; Expected date: 03/20/2025  -     Iron and TIBC; Future; Expected date: 03/20/2025  -     Ferritin; Future; Expected date: 03/20/2025    6. Screening PSA (prostate specific antigen)  -     CBC Auto Differential; Future; Expected date: 03/20/2025  -     Comprehensive Metabolic Panel; Future; Expected date: 03/20/2025  -     Hemoglobin A1C; Future; Expected date: 03/20/2025  -     Lipid Panel; Future; Expected date: 03/20/2025  -     Microalbumin/Creatinine Ratio, Urine; Future; Expected date: 03/20/2025  -     PSA, Screening; Future; Expected date: 03/20/2025  -     TSH; Future; Expected date: 03/20/2025  -     Iron and TIBC; Future; Expected date: 03/20/2025  -     Ferritin; Future; Expected date: 03/20/2025    7. Well adult exam  -      CBC Auto Differential; Future; Expected date: 03/20/2025  -     Comprehensive Metabolic Panel; Future; Expected date: 03/20/2025  -     Hemoglobin A1C; Future; Expected date: 03/20/2025  -     Lipid Panel; Future; Expected date: 03/20/2025  -     Microalbumin/Creatinine Ratio, Urine; Future; Expected date: 03/20/2025  -     PSA, Screening; Future; Expected date: 03/20/2025  -     TSH; Future; Expected date: 03/20/2025  -     Iron and TIBC; Future; Expected date: 03/20/2025  -     Ferritin; Future; Expected date: 03/20/2025            Follow up: Follow up in about 6 months (around 3/20/2025) for wellness, Labs check.

## 2024-10-07 LAB
LEFT EYE DM RETINOPATHY: NEGATIVE
RIGHT EYE DM RETINOPATHY: NEGATIVE

## 2024-10-08 DIAGNOSIS — D50.8 IRON DEFICIENCY ANEMIA SECONDARY TO INADEQUATE DIETARY IRON INTAKE: ICD-10-CM

## 2024-10-08 DIAGNOSIS — I10 HYPERTENSION, UNSPECIFIED TYPE: Chronic | ICD-10-CM

## 2024-10-08 RX ORDER — AMLODIPINE BESYLATE 10 MG/1
10 TABLET ORAL DAILY
Qty: 90 TABLET | Refills: 3 | Status: SHIPPED | OUTPATIENT
Start: 2024-10-08

## 2024-10-16 ENCOUNTER — DOCUMENTATION ONLY (OUTPATIENT)
Dept: INTERNAL MEDICINE | Facility: CLINIC | Age: 74
End: 2024-10-16
Payer: MEDICARE

## 2024-10-22 DIAGNOSIS — M10.9 GOUT, UNSPECIFIED CAUSE, UNSPECIFIED CHRONICITY, UNSPECIFIED SITE: ICD-10-CM

## 2024-10-22 RX ORDER — ALLOPURINOL 300 MG/1
300 TABLET ORAL
Qty: 90 TABLET | Refills: 2 | Status: SHIPPED | OUTPATIENT
Start: 2024-10-22

## 2024-11-11 DIAGNOSIS — I10 HYPERTENSION, UNSPECIFIED TYPE: ICD-10-CM

## 2024-11-11 RX ORDER — LISINOPRIL 10 MG/1
10 TABLET ORAL 2 TIMES DAILY
Qty: 90 TABLET | Refills: 2 | Status: SHIPPED | OUTPATIENT
Start: 2024-11-11

## 2024-11-28 DIAGNOSIS — M10.9 GOUT, UNSPECIFIED CAUSE, UNSPECIFIED CHRONICITY, UNSPECIFIED SITE: Chronic | ICD-10-CM

## 2024-12-02 RX ORDER — DICLOFENAC SODIUM 75 MG/1
75 TABLET, DELAYED RELEASE ORAL 2 TIMES DAILY
Qty: 180 TABLET | Refills: 0 | Status: SHIPPED | OUTPATIENT
Start: 2024-12-02

## 2024-12-06 DIAGNOSIS — G47.00 INSOMNIA, UNSPECIFIED TYPE: ICD-10-CM

## 2024-12-09 RX ORDER — TRAZODONE HYDROCHLORIDE 50 MG/1
50 TABLET ORAL NIGHTLY
Qty: 90 TABLET | Refills: 0 | Status: SHIPPED | OUTPATIENT
Start: 2024-12-09

## 2025-02-05 DIAGNOSIS — E03.9 HYPOTHYROIDISM, UNSPECIFIED TYPE: Chronic | ICD-10-CM

## 2025-02-05 RX ORDER — LEVOTHYROXINE SODIUM 175 UG/1
175 TABLET ORAL
Qty: 90 TABLET | Refills: 2 | Status: SHIPPED | OUTPATIENT
Start: 2025-02-05

## 2025-02-06 DIAGNOSIS — M10.9 GOUT, UNSPECIFIED CAUSE, UNSPECIFIED CHRONICITY, UNSPECIFIED SITE: Chronic | ICD-10-CM

## 2025-02-06 RX ORDER — DICLOFENAC SODIUM 75 MG/1
75 TABLET, DELAYED RELEASE ORAL 2 TIMES DAILY
Qty: 180 TABLET | Refills: 0 | Status: SHIPPED | OUTPATIENT
Start: 2025-02-06

## 2025-03-08 DIAGNOSIS — G47.00 INSOMNIA, UNSPECIFIED TYPE: ICD-10-CM

## 2025-03-10 RX ORDER — TRAZODONE HYDROCHLORIDE 50 MG/1
50 TABLET ORAL NIGHTLY
Qty: 90 TABLET | Refills: 3 | Status: SHIPPED | OUTPATIENT
Start: 2025-03-10

## 2025-03-17 ENCOUNTER — TELEPHONE (OUTPATIENT)
Dept: INTERNAL MEDICINE | Facility: CLINIC | Age: 75
End: 2025-03-17
Payer: MEDICARE

## 2025-03-17 NOTE — TELEPHONE ENCOUNTER
1. Are there any outstanding tasks in the patient's chart? Yes, fasting labs    2. Is there any documentation in the chart? No, labs need

## 2025-03-19 ENCOUNTER — LAB VISIT (OUTPATIENT)
Dept: LAB | Facility: HOSPITAL | Age: 75
End: 2025-03-19
Attending: STUDENT IN AN ORGANIZED HEALTH CARE EDUCATION/TRAINING PROGRAM
Payer: MEDICARE

## 2025-03-19 ENCOUNTER — RESULTS FOLLOW-UP (OUTPATIENT)
Dept: INTERNAL MEDICINE | Facility: CLINIC | Age: 75
End: 2025-03-19

## 2025-03-19 DIAGNOSIS — I10 HYPERTENSION, UNSPECIFIED TYPE: ICD-10-CM

## 2025-03-19 DIAGNOSIS — E03.9 HYPOTHYROIDISM, UNSPECIFIED TYPE: ICD-10-CM

## 2025-03-19 DIAGNOSIS — Z00.00 WELL ADULT EXAM: ICD-10-CM

## 2025-03-19 DIAGNOSIS — D50.8 IRON DEFICIENCY ANEMIA SECONDARY TO INADEQUATE DIETARY IRON INTAKE: ICD-10-CM

## 2025-03-19 DIAGNOSIS — E11.42 TYPE 2 DIABETES MELLITUS WITH DIABETIC POLYNEUROPATHY, WITHOUT LONG-TERM CURRENT USE OF INSULIN: ICD-10-CM

## 2025-03-19 DIAGNOSIS — E78.5 HYPERLIPIDEMIA, UNSPECIFIED HYPERLIPIDEMIA TYPE: ICD-10-CM

## 2025-03-19 DIAGNOSIS — Z12.5 SCREENING PSA (PROSTATE SPECIFIC ANTIGEN): ICD-10-CM

## 2025-03-19 LAB
ALBUMIN SERPL-MCNC: 4.1 G/DL (ref 3.4–4.8)
ALBUMIN/GLOB SERPL: 1.8 RATIO (ref 1.1–2)
ALP SERPL-CCNC: 29 UNIT/L (ref 40–150)
ALT SERPL-CCNC: 21 UNIT/L (ref 0–55)
ANION GAP SERPL CALC-SCNC: 9 MEQ/L
AST SERPL-CCNC: 21 UNIT/L (ref 5–34)
BASOPHILS # BLD AUTO: 0.05 X10(3)/MCL
BASOPHILS NFR BLD AUTO: 0.9 %
BILIRUB SERPL-MCNC: 0.5 MG/DL
BUN SERPL-MCNC: 22.7 MG/DL (ref 8.4–25.7)
CALCIUM SERPL-MCNC: 9.4 MG/DL (ref 8.8–10)
CHLORIDE SERPL-SCNC: 106 MMOL/L (ref 98–107)
CHOLEST SERPL-MCNC: 134 MG/DL
CHOLEST/HDLC SERPL: 3 {RATIO} (ref 0–5)
CO2 SERPL-SCNC: 26 MMOL/L (ref 23–31)
CREAT SERPL-MCNC: 0.92 MG/DL (ref 0.72–1.25)
CREAT UR-MCNC: 58.7 MG/DL (ref 63–166)
CREAT/UREA NIT SERPL: 25
EOSINOPHIL # BLD AUTO: 0.38 X10(3)/MCL (ref 0–0.9)
EOSINOPHIL NFR BLD AUTO: 6.6 %
ERYTHROCYTE [DISTWIDTH] IN BLOOD BY AUTOMATED COUNT: 13.8 % (ref 11.5–17)
EST. AVERAGE GLUCOSE BLD GHB EST-MCNC: 122.6 MG/DL
FERRITIN SERPL-MCNC: 371.37 NG/ML (ref 21.81–274.66)
GFR SERPLBLD CREATININE-BSD FMLA CKD-EPI: >60 ML/MIN/1.73/M2
GLOBULIN SER-MCNC: 2.3 GM/DL (ref 2.4–3.5)
GLUCOSE SERPL-MCNC: 161 MG/DL (ref 82–115)
HBA1C MFR BLD: 5.9 %
HCT VFR BLD AUTO: 36.9 % (ref 42–52)
HDLC SERPL-MCNC: 41 MG/DL (ref 35–60)
HGB BLD-MCNC: 12.2 G/DL (ref 14–18)
IMM GRANULOCYTES # BLD AUTO: 0.05 X10(3)/MCL (ref 0–0.04)
IMM GRANULOCYTES NFR BLD AUTO: 0.9 %
IRON SATN MFR SERPL: 29 % (ref 20–50)
IRON SERPL-MCNC: 84 UG/DL (ref 65–175)
LDLC SERPL CALC-MCNC: 72 MG/DL (ref 50–140)
LYMPHOCYTES # BLD AUTO: 1.07 X10(3)/MCL (ref 0.6–4.6)
LYMPHOCYTES NFR BLD AUTO: 18.7 %
MCH RBC QN AUTO: 33.6 PG (ref 27–31)
MCHC RBC AUTO-ENTMCNC: 33.1 G/DL (ref 33–36)
MCV RBC AUTO: 101.7 FL (ref 80–94)
MICROALBUMIN UR-MCNC: 8.4 UG/ML
MICROALBUMIN/CREAT RATIO PNL UR: 14.3 MG/GM CR (ref 0–30)
MONOCYTES # BLD AUTO: 0.52 X10(3)/MCL (ref 0.1–1.3)
MONOCYTES NFR BLD AUTO: 9.1 %
NEUTROPHILS # BLD AUTO: 3.66 X10(3)/MCL (ref 2.1–9.2)
NEUTROPHILS NFR BLD AUTO: 63.8 %
NRBC BLD AUTO-RTO: 0 %
PLATELET # BLD AUTO: 179 X10(3)/MCL (ref 130–400)
PMV BLD AUTO: 9.8 FL (ref 7.4–10.4)
POTASSIUM SERPL-SCNC: 4.7 MMOL/L (ref 3.5–5.1)
PROT SERPL-MCNC: 6.4 GM/DL (ref 5.8–7.6)
PSA SERPL-MCNC: 2.11 NG/ML
RBC # BLD AUTO: 3.63 X10(6)/MCL (ref 4.7–6.1)
SODIUM SERPL-SCNC: 141 MMOL/L (ref 136–145)
TIBC SERPL-MCNC: 210 UG/DL (ref 60–240)
TIBC SERPL-MCNC: 294 UG/DL (ref 250–450)
TRANSFERRIN SERPL-MCNC: 265 MG/DL (ref 163–344)
TRIGL SERPL-MCNC: 105 MG/DL (ref 34–140)
TSH SERPL-ACNC: 1.51 UIU/ML (ref 0.35–4.94)
VLDLC SERPL CALC-MCNC: 21 MG/DL
WBC # BLD AUTO: 5.73 X10(3)/MCL (ref 4.5–11.5)

## 2025-03-19 PROCEDURE — 83036 HEMOGLOBIN GLYCOSYLATED A1C: CPT

## 2025-03-19 PROCEDURE — 83550 IRON BINDING TEST: CPT

## 2025-03-19 PROCEDURE — 36415 COLL VENOUS BLD VENIPUNCTURE: CPT

## 2025-03-19 PROCEDURE — 80061 LIPID PANEL: CPT

## 2025-03-19 PROCEDURE — 84443 ASSAY THYROID STIM HORMONE: CPT

## 2025-03-19 PROCEDURE — 82728 ASSAY OF FERRITIN: CPT

## 2025-03-19 PROCEDURE — 84153 ASSAY OF PSA TOTAL: CPT

## 2025-03-19 PROCEDURE — 85025 COMPLETE CBC W/AUTO DIFF WBC: CPT

## 2025-03-19 PROCEDURE — 82043 UR ALBUMIN QUANTITATIVE: CPT

## 2025-03-19 PROCEDURE — 80053 COMPREHEN METABOLIC PANEL: CPT

## 2025-03-24 ENCOUNTER — OFFICE VISIT (OUTPATIENT)
Dept: INTERNAL MEDICINE | Facility: CLINIC | Age: 75
End: 2025-03-24
Payer: MEDICARE

## 2025-03-24 VITALS
RESPIRATION RATE: 18 BRPM | OXYGEN SATURATION: 97 % | WEIGHT: 235.63 LBS | HEIGHT: 69 IN | HEART RATE: 59 BPM | DIASTOLIC BLOOD PRESSURE: 73 MMHG | BODY MASS INDEX: 34.9 KG/M2 | SYSTOLIC BLOOD PRESSURE: 133 MMHG

## 2025-03-24 DIAGNOSIS — E03.9 HYPOTHYROIDISM, UNSPECIFIED TYPE: Chronic | ICD-10-CM

## 2025-03-24 DIAGNOSIS — Z00.00 MEDICARE ANNUAL WELLNESS VISIT, SUBSEQUENT: ICD-10-CM

## 2025-03-24 DIAGNOSIS — I27.29 OTHER SECONDARY PULMONARY HYPERTENSION: ICD-10-CM

## 2025-03-24 DIAGNOSIS — D84.821 IMMUNODEFICIENCY DUE TO DRUGS (CODE): ICD-10-CM

## 2025-03-24 DIAGNOSIS — E11.42 TYPE 2 DIABETES MELLITUS WITH DIABETIC POLYNEUROPATHY, WITHOUT LONG-TERM CURRENT USE OF INSULIN: ICD-10-CM

## 2025-03-24 DIAGNOSIS — M06.09 RHEUMATOID ARTHRITIS WITHOUT RHEUMATOID FACTOR, MULTIPLE SITES: Primary | ICD-10-CM

## 2025-03-24 DIAGNOSIS — E78.5 HYPERLIPIDEMIA, UNSPECIFIED HYPERLIPIDEMIA TYPE: Chronic | ICD-10-CM

## 2025-03-24 DIAGNOSIS — I25.119 ATHEROSCLEROSIS OF NATIVE CORONARY ARTERY OF NATIVE HEART WITH ANGINA PECTORIS: ICD-10-CM

## 2025-03-24 DIAGNOSIS — I10 HYPERTENSION, UNSPECIFIED TYPE: Chronic | ICD-10-CM

## 2025-03-24 PROCEDURE — G0439 PPPS, SUBSEQ VISIT: HCPCS | Mod: ,,, | Performed by: STUDENT IN AN ORGANIZED HEALTH CARE EDUCATION/TRAINING PROGRAM

## 2025-03-26 PROBLEM — E66.01 CLASS 2 SEVERE OBESITY DUE TO EXCESS CALORIES WITH SERIOUS COMORBIDITY AND BODY MASS INDEX (BMI) OF 36.0 TO 36.9 IN ADULT: Chronic | Status: RESOLVED | Noted: 2023-07-05 | Resolved: 2025-03-26

## 2025-03-26 PROBLEM — E66.812 CLASS 2 SEVERE OBESITY DUE TO EXCESS CALORIES WITH SERIOUS COMORBIDITY AND BODY MASS INDEX (BMI) OF 36.0 TO 36.9 IN ADULT: Chronic | Status: RESOLVED | Noted: 2023-07-05 | Resolved: 2025-03-26

## 2025-03-26 PROBLEM — D84.821 IMMUNODEFICIENCY DUE TO DRUGS (CODE): Status: ACTIVE | Noted: 2025-03-26

## 2025-03-26 PROBLEM — M06.09 RHEUMATOID ARTHRITIS WITHOUT RHEUMATOID FACTOR, MULTIPLE SITES: Status: ACTIVE | Noted: 2025-03-26

## 2025-03-26 PROBLEM — Z00.00 MEDICARE ANNUAL WELLNESS VISIT, SUBSEQUENT: Status: ACTIVE | Noted: 2025-03-26

## 2025-03-26 NOTE — ASSESSMENT & PLAN NOTE
Established with Rheumatology  Started on Enbrel and methotrexate but still having increased pain  Will discuss with Rheumatologist regarding going up on doses

## 2025-03-26 NOTE — PROGRESS NOTES
"Subjective:      Darwin Fletcher  03/26/2025  [id number]    Vanessa Stout MD  Patient Care Team:  Vanessa Stout MD as PCP - General (Internal Medicine)  Yoli Sanchez LPN as Licensed Practical Nurse  [place], Aileen Chau MD as Consulting Physician (Rheumatology)  Jc Vega MD as Consulting Physician (Cardiology)          Visit Type:a scheduled routine follow-up visit    Chief Complaint: Medicare AWV, Hypotension (Low BP x8m ), and Dizziness    History of Present Illness    Mr Granados presents today for Medicare wellness visit. He reports low diastolic blood pressure readings, typically in the 50s-70s, with recent readings of 138/59 in the morning and 124/54 around lunchtime. He experiences associated dizziness. Currently taking amlodipine and lisinopril 10 mg for management. He reports variable fatigue severity. While some days he maintains activity throughout the day, he frequently becomes exhausted after two hours of activity, describing having "nothing left." His walking tolerance is limited to 200-300 steps before requiring 5-10 minute rest periods. He reports significant energy depletion after exceeding 5,000 steps daily, with maximum tolerance around 7,000-8,000 steps as demonstrated during recent yard work. His blood sugars are generally well controlled with readings typically around 100 mg/dL or less, though he experiences consistently elevated fasting blood sugar in the morning. He reports widespread arthritis pain affecting multiple joints including knees and shoulders. Currently taking Enbrel and methotrexate, which do not adequately control his pain. He takes morning diclofenac and afternoon Tylenol for additional pain management. Post-surgery, he has limited hand mobility with significant pain in one specific area. He can open his hand with effort.         Past Medical History:   Diagnosis Date    Arthritis     Bulging of lumbar intervertebral disc     CAD (coronary artery " disease)     Class 2 severe obesity due to excess calories with serious comorbidity and body mass index (BMI) of 36.0 to 36.9 in adult 07/05/2023    Digestive disorder     Gout 06/06/2022    Hypertension 06/06/2022    Hypothyroidism 06/06/2022    Personal history of colonic polyps     Rheumatoid arthritis     Sleep apnea 06/06/2022    CPAP    Synovitis and tenosynovitis, unspecified     Type 2 diabetes mellitus with diabetic polyneuropathy, without long-term current use of insulin 06/06/2022     Past Surgical History:   Procedure Laterality Date    BUNIONECTOMY Left     CATARACT EXTRACTION W/  INTRAOCULAR LENS IMPLANT Bilateral     CHOLECYSTECTOMY      COLONOSCOPY  03/17/2015    COLONOSCOPY Bilateral 07/20/2020    CORONARY ANGIOPLASTY WITH STENT PLACEMENT N/A     x3 stents    Excision of Skin Cancer (Arm)  N/A     EYE SURGERY      INJECTION OF ANESTHETIC AGENT AROUND MEDIAL BRANCH NERVES INNERVATING LUMBAR FACET JOINT N/A 10/26/2022    Procedure: Block-nerve-medial branch-lumbar;  Surgeon: Samreen Horne MD;  Location: [facility] OR;  Service: Pain Management;  Laterality: N/A;  Left L4/L5    KNEE ARTHROSCOPY Left     TRANSFER OF HAND TENDON Right 02/22/2024    Procedure: TRANSFER, TENDON, HAND;  Surgeon: Javy Price MD;  Location: [facility] OR;  Service: Orthopedics;  Laterality: Right;    TRANSFORAMINAL EPIDURAL INJECTION OF STEROID Left 01/05/2023    Procedure: INJECTION, STEROID, EPIDURAL, TRANSFORAMINAL APPROACH Left L5;  Surgeon: Samreen Horne MD;  Location: [facility] OR;  Service: Pain Management;  Laterality: Left;  L5    VASECTOMY N/A      Family History   Problem Relation Name Age of Onset    Heart attack Mother      Lung cancer Father C Spenser Fletcher     Cancer Father C Spenser Fletcher      Social History     Tobacco Use    Smoking status: Former     Current packs/day: 2.00     Average packs/day: 2.0 packs/day for 35.0 years (70.0 ttl pk-yrs)     Types: Cigarettes    Smokeless tobacco: Never   Substance and Sexual  Activity    Alcohol use: Yes     Alcohol/week: 9.0 standard drinks of alcohol     Types: 2 Glasses of wine, 2 Cans of beer, 3 Shots of liquor, 2 Drinks containing 0.5 oz of alcohol per week     Comment: twice weekly    Drug use: Never    Sexual activity: Yes     Partners: Female     Active Problem List with Overview Notes    Diagnosis Date Noted    Rheumatoid arthritis without rheumatoid factor, multiple sites 03/26/2025    Immunodeficiency due to drugs (CODE) 03/26/2025    Medicare annual wellness visit, subsequent 03/26/2025    Extensor tendon rupture of hand, right, initial encounter 02/22/2024    Extensor tendon rupture, non-traumatic, hand and wrist, right 01/12/2024    Ganglion cyst 11/30/2023    Sensation disorder 11/30/2023    Gastroesophageal reflux disease without esophagitis 10/03/2023    Hyperlipidemia 10/03/2023    Inflammatory arthritis     Insomnia 05/01/2023    Hyperuricemia without signs of inflammatory arthritis and tophaceous disease 03/13/2023    Swelling of first metatarsophalangeal (MTP) joint 03/13/2023     left foot      Type 2 diabetes mellitus with diabetic polyneuropathy, without long-term current use of insulin 06/06/2022    Other secondary pulmonary hypertension 06/06/2022    Atherosclerosis of native coronary artery of native heart with angina pectoris 06/06/2022    Hypothyroidism 06/06/2022    Seasonal allergic rhinitis 06/06/2022    Sleep apnea 06/06/2022     uses CPAP  uses CPAP      Hypertension 06/06/2022    Gout 06/06/2022     Review of patient's allergies indicates:  No Known Allergies    The following were reviewed at this visit: active problem list, medication list, allergies, family history, social history, and health maintenance.    Medications:  Current Medications[1]      Medications have been reviewed and reconciled with patient at this visit.  Barriers to medications reviewed with patient.    Adverse reactions to current medications reviewed with patient..    Over the  "counter medications reviewed and reconciled with patient.    Opioid Screening: Patient medication list reviewed, patient is not taking prescription opioids. Patient is not using additional opioids than prescribed. Patient is at low risk of substance abuse based on this opioid use history.     Review of Systems   Constitutional:  Negative for chills, diaphoresis, fever and weight loss.   HENT:  Negative for congestion, ear discharge, sinus pain and sore throat.    Eyes:  Negative for photophobia.   Respiratory:  Negative for cough, hemoptysis and shortness of breath.    Cardiovascular:  Negative for chest pain, palpitations, claudication, leg swelling and PND.   Gastrointestinal:  Negative for constipation, diarrhea, nausea and vomiting.   Genitourinary:  Negative for dysuria, frequency and urgency.   Musculoskeletal:  Negative for back pain, joint pain, myalgias and neck pain.   Skin:  Negative for itching and rash.   Neurological:  Negative for dizziness, focal weakness and headaches.   Psychiatric/Behavioral:  Negative for depression. The patient does not have insomnia.                   Objective:      Vitals:    03/24/25 0952   BP: 133/73   Pulse: (!) 59   Resp: 18   SpO2: 97%   Weight: 106.9 kg (235 lb 9.6 oz)   Height: 5' 9" (1.753 m)       Physical Exam  Constitutional:       Appearance: Normal appearance.   HENT:      Head: Normocephalic and atraumatic.   Cardiovascular:      Rate and Rhythm: Normal rate and regular rhythm.      Pulses: Normal pulses.   Pulmonary:      Effort: Pulmonary effort is normal.      Breath sounds: Normal breath sounds.   Abdominal:      General: Abdomen is flat. Bowel sounds are normal. There is no distension.      Palpations: Abdomen is soft.      Tenderness: There is no abdominal tenderness.   Musculoskeletal:         General: Normal range of motion.      Right lower leg: No edema.      Left lower leg: No edema.   Lymphadenopathy:      Cervical: No cervical adenopathy. "   Neurological:      General: No focal deficit present.      Mental Status: He is alert and oriented to person, place, and time.           A comprehensive HEALTH RISK ASSESSMENT was completed today. Results are summarized below:    There are NO EMOTIONAL/SOCIAL CONCERNS identified on today's screening for Social Isolation, Depression and Anxiety.    There are NO COGNITIVE FUNCTION CONCERNS identified on today's screening.     The patient reports NO OPIOID PRESCRIPTIONS. This was confirmed through medication reconciliation and the [facility] website.    The patient is NOT A TOBACCO USER.        All Questions regarding food, transportation or housing were not answered today.        Laboratory Reviewed ({Yes)  Lab Results   Component Value Date    WBC 5.73 03/19/2025    HGB 12.2 (L) 03/19/2025    HCT 36.9 (L) 03/19/2025     03/19/2025    CHOL 134 03/19/2025    TRIG 105 03/19/2025    HDL 41 03/19/2025    ALT 21 03/19/2025    AST 21 03/19/2025     03/19/2025    K 4.7 03/19/2025     03/19/2025    CREATININE 0.92 03/19/2025    BUN 22.7 03/19/2025    CO2 26 03/19/2025    TSH 1.509 03/19/2025    PSA 2.11 03/19/2025    HGBA1C 5.9 03/19/2025         Assessment and Plan:       Problem List Items Addressed This Visit          Cardiac/Vascular    Hypertension (Chronic)    Reports low diastolic blood pressure causing dizziness and fatigue  Will try decreasing lisinopril to 10 mg once a day instead of twice a day  Continue amlodipine   Continue to monitor BP at home and keep log              Hyperlipidemia (Chronic)    LDL and triglycerides ar gial  Continue fenofibrate          Other secondary pulmonary hypertension    Established with Cardiology  Stable          Atherosclerosis of native coronary artery of native heart with angina pectoris    Stable  Established with Cardiology              Immunology/Multi System    Rheumatoid arthritis without rheumatoid factor, multiple sites - Primary    Established with  Rheumatology  Started on Enbrel and methotrexate but still having increased pain  Will discuss with Rheumatologist regarding going up on doses                Endocrine    Type 2 diabetes mellitus with diabetic polyneuropathy, without long-term current use of insulin (Chronic)    A1C of 5.9, controlled  Continue metormin          Hypothyroidism (Chronic)    TSH within normal limits  Continue levothyroxine             Other    Immunodeficiency due to drugs (CODE)    Medicare annual wellness visit, subsequent    Colonoscopy: done 07/2020  Labs: done and reviewed in office               Care Plan/Goals: Reviewed    Goals    None         Follow up: Follow up in about 6 months (around 9/24/2025) for Diabetes f/u, Bp follow up.    No orders of the defined types were placed in this encounter.      Medicare Annual Wellness and Personalized Prevention Plan:   Fall Risk + Home Safety + Hearing Impairment + Depression Screen + Cognitive Impairment Screen + Health Risk Assessment all reviewed.     Health Maintenance Topics with due status: Not Due       Topic Last Completion Date    Colorectal Cancer Screening 07/27/2020    Foot Exam 09/20/2024    Diabetic Eye Exam 10/07/2024    Diabetes Urine Screening 03/19/2025    Lipid Panel 03/19/2025    Hemoglobin A1c 03/19/2025      The patient's Health Maintenance was reviewed and the following appears to be due at this time:   Health Maintenance Due   Topic Date Due    TETANUS VACCINE  Never done    Aspirin/Antiplatelet Therapy  Never done    High Dose Statin  Never done    Shingles Vaccine (2 of 2) 12/23/2020    COVID-19 Vaccine (8 - 2024-25 season) 09/01/2024       Advance Care Planning   I attest to discussing Advance Care Planning with patient and/or family member.  Education was provided including the importance of the Health Care Power of , Advance Directives, and/or [place] documentation.  The patient expressed understanding to the importance of this information and  discussion.            [1]   Current Outpatient Medications:     allopurinoL (ZYLOPRIM) 300 MG tablet, Take 1 tablet by mouth once daily., Disp: 90 tablet, Rfl: 2    amLODIPine (NORVASC) 10 MG tablet, Take 1 tablet (10 mg total) by mouth once daily., Disp: 90 tablet, Rfl: 3    aspirin 81 mg Cap, Take 1 tablet by mouth 2 (two) times a day., Disp: , Rfl:     blood sugar diagnostic (TRUE METRIX GLUCOSE TEST STRIP) Strp, USE TO CHECK CBG TWICE DAILY, Disp: 200 each, Rfl: 1    diclofenac (VOLTAREN) 75 MG EC tablet, Take 1 tablet (75 mg total) by mouth 2 (two) times daily., Disp: 180 tablet, Rfl: 0    ENBREL SURECLICK 50 mg/mL (1 mL) PnIj, Inject into the skin every 7 days., Disp: , Rfl:     fenofibrate 160 MG Tab, Take 1 tablet by mouth once daily., Disp: 90 tablet, Rfl: 2    folic acid (FOLVITE) 1 MG tablet, Take 1,000 mcg by mouth once daily., Disp: , Rfl:     iron-vit c-b12-folic acid (IRON 100 PLUS) Tab, 1 tablet Daily., Disp: , Rfl:     levothyroxine (SYNTHROID, LEVOTHROID) 175 MCG tablet, Take 1 tablet by mouth before breakfast., Disp: 90 tablet, Rfl: 2    lisinopriL 10 MG tablet, Take 1 tablet by mouth twice daily. (Patient taking differently: Take 10 mg by mouth once daily.), Disp: 90 tablet, Rfl: 1    metFORMIN (GLUCOPHAGE) 500 MG tablet, Take 0.5 tablets (250 mg total) by mouth 2 (two) times daily with meals. Take 1/2 tablet bid, Disp: 180 tablet, Rfl: 2    methotrexate 2.5 MG Tab, Take 2.5 mg by mouth every 7 days., Disp: , Rfl:     pyridoxine, vitamin B6, (VITAMIN B-6) 50 MG Tab, Take 50 mg by mouth once daily., Disp: , Rfl:     thiamine (VITAMIN B-1) 100 MG tablet, Take 100 mg by mouth once daily., Disp: , Rfl:     traZODone (DESYREL) 50 MG tablet, Take 1 tablet by mouth every evening., Disp: 90 tablet, Rfl: 3  No current facility-administered medications for this visit.    Facility-Administered Medications Ordered in Other Visits:     acetaminophen tablet 1,000 mg, 1,000 mg, Oral, On Call Procedure,  Edyta Zaidi MD, 1,000 mg at 02/22/24 0818    celecoxib capsule 200 mg, 200 mg, Oral, On Call Procedure, Edyta Zaidi MD, 200 mg at 02/22/24 0819    HYDROcodone-acetaminophen 5-325 mg per tablet 1 tablet, 1 tablet, Oral, Q3H PRN, Edyta Zaidi MD    HYDROmorphone (PF) injection 0.4 mg, 0.4 mg, Intravenous, Q5 Min PRN, Edyta Zaidi MD    midazolam (VERSED) 1 mg/mL injection 2 mg, 2 mg, Intravenous, On Call Procedure, Edyta Zaidi MD, 2 mg at 02/22/24 0840    ondansetron injection 4 mg, 4 mg, Intravenous, Daily PRN, Edyta Zaidi MD    prochlorperazine injection Soln 5 mg, 5 mg, Intravenous, Q30 Min PRN, Edyta Zaidi MD

## 2025-03-26 NOTE — ASSESSMENT & PLAN NOTE
Reports low diastolic blood pressure causing dizziness and fatigue  Will try decreasing lisinopril to 10 mg once a day instead of twice a day  Continue amlodipine   Continue to monitor BP at home and keep log

## 2025-04-13 DIAGNOSIS — E78.5 HYPERLIPIDEMIA, UNSPECIFIED HYPERLIPIDEMIA TYPE: ICD-10-CM

## 2025-04-14 RX ORDER — FENOFIBRATE 160 MG/1
160 TABLET ORAL
Qty: 90 TABLET | Refills: 1 | Status: SHIPPED | OUTPATIENT
Start: 2025-04-14

## 2025-04-17 DIAGNOSIS — M10.9 GOUT, UNSPECIFIED CAUSE, UNSPECIFIED CHRONICITY, UNSPECIFIED SITE: Chronic | ICD-10-CM

## 2025-04-21 ENCOUNTER — OFFICE VISIT (OUTPATIENT)
Dept: INTERNAL MEDICINE | Facility: CLINIC | Age: 75
End: 2025-04-21
Payer: MEDICARE

## 2025-04-21 ENCOUNTER — RESULTS FOLLOW-UP (OUTPATIENT)
Dept: INTERNAL MEDICINE | Facility: CLINIC | Age: 75
End: 2025-04-21

## 2025-04-21 VITALS
HEART RATE: 59 BPM | WEIGHT: 234 LBS | HEIGHT: 69 IN | SYSTOLIC BLOOD PRESSURE: 137 MMHG | RESPIRATION RATE: 18 BRPM | TEMPERATURE: 98 F | BODY MASS INDEX: 34.66 KG/M2 | OXYGEN SATURATION: 97 % | DIASTOLIC BLOOD PRESSURE: 62 MMHG

## 2025-04-21 DIAGNOSIS — J06.9 ACUTE UPPER RESPIRATORY INFECTION, UNSPECIFIED: ICD-10-CM

## 2025-04-21 LAB
FLUAV AG UPPER RESP QL IA.RAPID: NOT DETECTED
FLUBV AG UPPER RESP QL IA.RAPID: NOT DETECTED
RSV A 5' UTR RNA NPH QL NAA+PROBE: NOT DETECTED
SARS-COV-2 RNA RESP QL NAA+PROBE: NOT DETECTED

## 2025-04-21 PROCEDURE — 0241U COVID/RSV/FLU A&B PCR: CPT | Performed by: STUDENT IN AN ORGANIZED HEALTH CARE EDUCATION/TRAINING PROGRAM

## 2025-04-21 PROCEDURE — 99213 OFFICE O/P EST LOW 20 MIN: CPT | Mod: ,,, | Performed by: STUDENT IN AN ORGANIZED HEALTH CARE EDUCATION/TRAINING PROGRAM

## 2025-04-21 RX ORDER — DICLOFENAC SODIUM 75 MG/1
75 TABLET, DELAYED RELEASE ORAL 2 TIMES DAILY
Qty: 180 TABLET | Refills: 0 | Status: SHIPPED | OUTPATIENT
Start: 2025-04-21

## 2025-04-21 NOTE — PROGRESS NOTES
Please let patient know of negative Covid/flu/RSV results. Please advise patient symptoms are likely from viral URI, recommend to take Mucinex or Coricidin twice a day for congestion, Tylenol as needed for fever, to stay well hydrated, stay well hydrated. Please advise patient to call clinic if symptoms have not improved after 1 week

## 2025-04-23 PROBLEM — J06.9 ACUTE UPPER RESPIRATORY INFECTION, UNSPECIFIED: Status: ACTIVE | Noted: 2025-04-23

## 2025-04-24 NOTE — ASSESSMENT & PLAN NOTE
Covid/flu/RSV PCR obtained today  Advised patient to take Mucinex DM BID as needed for congestion and postnasal drip and Tylenol as needed for fever and body aches   Lungs are clear to auscultation, no wheezing noted

## 2025-05-08 ENCOUNTER — TELEPHONE (OUTPATIENT)
Dept: INTERNAL MEDICINE | Facility: CLINIC | Age: 75
End: 2025-05-08
Payer: MEDICARE

## 2025-05-08 DIAGNOSIS — J06.9 ACUTE UPPER RESPIRATORY INFECTION, UNSPECIFIED: Primary | ICD-10-CM

## 2025-05-08 RX ORDER — AZITHROMYCIN 250 MG/1
TABLET, FILM COATED ORAL
Qty: 6 TABLET | Refills: 0 | Status: SHIPPED | OUTPATIENT
Start: 2025-05-08 | End: 2025-05-13

## 2025-05-08 NOTE — TELEPHONE ENCOUNTER
Copied from CRM #[id number]. Topic: General Inquiry - Patient Advice  >> May 8, 2025 12:33 PM Em[place] wrote:  Who Called: Darwin Fletcher    Caller is requesting assistance/information from provider's office.    Symptoms (please be specific): sinus issues   How long has patient had these symptoms:  3 weeks  List of preferred pharmacies on file (remove unneeded): [unfilled]  If different, enter pharmacy into here including location and phone number: [facility] #[id number] at [address] - [facility] [address] AT    Phone: [phone]  Fax: [phone]      Preferred Method of Contact: Phone Call  Patient's Preferred Phone Number on File: [phone]   Best Call Back Number, if different:  Additional Information: medical advice, please advise, thanks

## 2025-05-08 NOTE — TELEPHONE ENCOUNTER
Copied from CRM #[id number]. Topic: General Inquiry - Patient Advice  >> May 8, 2025 11:10 AM Rosette Sharpe wrote:  Who Called: Darwin Fletcher    Caller is requesting assistance/information from provider's office.    Symptoms (please be specific): N/A   How long has patient had these symptoms:  N/A  List of preferred pharmacies on file (remove unneeded): [unfilled]  If different, enter pharmacy into here including location and phone number: N/A      Preferred Method of Contact: Phone Call  Patient's Preferred Phone Number on File: [phone]   Best Call Back Number, if different:  Additional Information: pt stated he like a cb . Pt said he's needing meds for a sinus infection

## 2025-05-29 DIAGNOSIS — M10.9 GOUT, UNSPECIFIED CAUSE, UNSPECIFIED CHRONICITY, UNSPECIFIED SITE: ICD-10-CM

## 2025-05-29 RX ORDER — ALLOPURINOL 300 MG/1
300 TABLET ORAL
Qty: 90 TABLET | Refills: 1 | Status: SHIPPED | OUTPATIENT
Start: 2025-05-29

## 2025-06-26 DIAGNOSIS — M10.9 GOUT, UNSPECIFIED CAUSE, UNSPECIFIED CHRONICITY, UNSPECIFIED SITE: Chronic | ICD-10-CM

## 2025-06-26 RX ORDER — DICLOFENAC SODIUM 75 MG/1
75 TABLET, DELAYED RELEASE ORAL 2 TIMES DAILY
Qty: 180 TABLET | Refills: 0 | Status: SHIPPED | OUTPATIENT
Start: 2025-06-26

## 2025-07-01 ENCOUNTER — TELEPHONE (OUTPATIENT)
Dept: HEPATOLOGY | Facility: HOSPITAL | Age: 75
End: 2025-07-01
Payer: MEDICARE

## 2025-07-09 ENCOUNTER — OFFICE VISIT (OUTPATIENT)
Dept: INTERNAL MEDICINE | Facility: CLINIC | Age: 75
End: 2025-07-09
Payer: MEDICARE

## 2025-07-09 ENCOUNTER — HOSPITAL ENCOUNTER (OUTPATIENT)
Dept: RADIOLOGY | Facility: HOSPITAL | Age: 75
Discharge: HOME | End: 2025-07-09
Attending: STUDENT IN AN ORGANIZED HEALTH CARE EDUCATION/TRAINING PROGRAM
Payer: MEDICARE

## 2025-07-09 VITALS
OXYGEN SATURATION: 95 % | HEART RATE: 65 BPM | SYSTOLIC BLOOD PRESSURE: 128 MMHG | WEIGHT: 224 LBS | BODY MASS INDEX: 33.18 KG/M2 | HEIGHT: 69 IN | TEMPERATURE: 98 F | DIASTOLIC BLOOD PRESSURE: 73 MMHG

## 2025-07-09 DIAGNOSIS — R05.9 COUGH, UNSPECIFIED TYPE: ICD-10-CM

## 2025-07-09 DIAGNOSIS — Z01.818 PREOP EXAM FOR INTERNAL MEDICINE: Primary | ICD-10-CM

## 2025-07-09 DIAGNOSIS — E11.42 TYPE 2 DIABETES MELLITUS WITH DIABETIC POLYNEUROPATHY, WITHOUT LONG-TERM CURRENT USE OF INSULIN: Chronic | ICD-10-CM

## 2025-07-09 DIAGNOSIS — E03.9 HYPOTHYROIDISM, UNSPECIFIED TYPE: Chronic | ICD-10-CM

## 2025-07-09 DIAGNOSIS — I10 HYPERTENSION, UNSPECIFIED TYPE: Chronic | ICD-10-CM

## 2025-07-09 DIAGNOSIS — M06.09 RHEUMATOID ARTHRITIS WITHOUT RHEUMATOID FACTOR, MULTIPLE SITES: ICD-10-CM

## 2025-07-09 DIAGNOSIS — I25.119 ATHEROSCLEROSIS OF NATIVE CORONARY ARTERY OF NATIVE HEART WITH ANGINA PECTORIS: ICD-10-CM

## 2025-07-09 PROCEDURE — 71046 X-RAY EXAM CHEST 2 VIEWS: CPT | Mod: TC

## 2025-07-09 PROCEDURE — 99214 OFFICE O/P EST MOD 30 MIN: CPT | Mod: ,,, | Performed by: STUDENT IN AN ORGANIZED HEALTH CARE EDUCATION/TRAINING PROGRAM

## 2025-07-09 RX ORDER — AMLODIPINE BESYLATE 5 MG/1
5 TABLET ORAL DAILY
Qty: 90 TABLET | Refills: 3 | Status: SHIPPED | OUTPATIENT
Start: 2025-07-09

## 2025-07-12 ENCOUNTER — RESULTS FOLLOW-UP (OUTPATIENT)
Dept: INTERNAL MEDICINE | Facility: CLINIC | Age: 75
End: 2025-07-12
Payer: MEDICARE

## 2025-07-12 DIAGNOSIS — R09.89 CHEST CONGESTION: Primary | ICD-10-CM

## 2025-07-12 PROBLEM — R05.9 COUGH: Status: ACTIVE | Noted: 2025-07-12

## 2025-07-12 PROBLEM — Z01.818 PREOP EXAM FOR INTERNAL MEDICINE: Status: ACTIVE | Noted: 2025-07-12

## 2025-07-12 NOTE — PROGRESS NOTES
Please let patient know of normal chest xray results. Please ask patient if congestion has improved, if no improvement will need antibiotics Augmentin 875 mg twice a day for 7 days.

## 2025-07-12 NOTE — ASSESSMENT & PLAN NOTE
Continues to have episodes where diastolic blood pressure is too low   Continue lisinopril 10 mg daily  Will decrease amlodipine to 5 mg daily   Continue to monitor BP and keep log

## 2025-07-12 NOTE — ASSESSMENT & PLAN NOTE
Due to cardiac history advised patient to contact Cardiology office to obtain clearance from them as well since they will have results from last Echo and/or stress test  Will need to discontinue aspirin for 7-10 days prior to the procedure  Patient is at low risk for low risk procedure

## 2025-07-12 NOTE — ASSESSMENT & PLAN NOTE
Chronic sinus congestion and cough  Will obtain chest xray today  Advised patient to continue taking Mucinex or Coricidin HBP for congestion for 5-7 days and then continue with Claritin and Flonase nasal spray daily.

## 2025-07-12 NOTE — PROGRESS NOTES
Subjective:      Darwin Fletcher  07/12/2025  [id number]      Chief Complaint: Pre-op Exam (Pt is here for a surgery clearance. Pt stated that he has a lot of chest congestion and fatigue x 3 months.)       History of Present Illness    Mr Granados presents today for pre-operative clearance for prostate biopsy and ongoing sinus infection. He reports ongoing sinus infection since April with persistent congestion that has spread to his lungs. He has attempted treatment with Mucinex for one week and is currently using Benadryl and sinus rinse to manage symptoms. Despite these interventions, he continues to experience mucus production and respiratory symptoms without resolution. He experiences episodes of low blood pressure dropping to the lower 50s diastolic with associated dizziness. He notes that Benadryl has helped increase his blood pressure. He has cardiac stents in place and last saw his cardiologist approximately six months ago with ongoing cardiac follow-up care.           Past Medical History:   Diagnosis Date    Arthritis     Bulging of lumbar intervertebral disc     CAD (coronary artery disease)     Class 2 severe obesity due to excess calories with serious comorbidity and body mass index (BMI) of 36.0 to 36.9 in adult 07/05/2023    Digestive disorder     Gout 06/06/2022    Hypertension 06/06/2022    Hypothyroidism 06/06/2022    Personal history of colonic polyps     Rheumatoid arthritis     Skin cancer 06/17/2025    Sleep apnea 06/06/2022    CPAP    Synovitis and tenosynovitis, unspecified     Type 2 diabetes mellitus with diabetic polyneuropathy, without long-term current use of insulin 06/06/2022     Past Surgical History:   Procedure Laterality Date    BUNIONECTOMY Left     CATARACT EXTRACTION W/  INTRAOCULAR LENS IMPLANT Bilateral     CHOLECYSTECTOMY      COLONOSCOPY  03/17/2015    COLONOSCOPY Bilateral 07/20/2020    CORONARY ANGIOPLASTY WITH STENT PLACEMENT N/A     x3 stents    Excision of Skin Cancer  (Arm)  N/A     EYE SURGERY      INJECTION OF ANESTHETIC AGENT AROUND MEDIAL BRANCH NERVES INNERVATING LUMBAR FACET JOINT N/A 10/26/2022    Procedure: Block-nerve-medial branch-lumbar;  Surgeon: Samreen Horne MD;  Location: [facility] OR;  Service: Pain Management;  Laterality: N/A;  Left L4/L5    KNEE ARTHROSCOPY Left     TRANSFER OF HAND TENDON Right 02/22/2024    Procedure: TRANSFER, TENDON, HAND;  Surgeon: Javy Price MD;  Location: [facility] OR;  Service: Orthopedics;  Laterality: Right;    TRANSFORAMINAL EPIDURAL INJECTION OF STEROID Left 01/05/2023    Procedure: INJECTION, STEROID, EPIDURAL, TRANSFORAMINAL APPROACH Left L5;  Surgeon: Samreen Horne MD;  Location: [facility] OR;  Service: Pain Management;  Laterality: Left;  L5    VASECTOMY N/A      Family History   Problem Relation Name Age of Onset    Heart attack Mother      Lung cancer Father RUBI Fletcher     Cancer Father RUBI Fletcher      Social History     Tobacco Use    Smoking status: Former     Current packs/day: 2.00     Average packs/day: 2.0 packs/day for 35.0 years (70.0 ttl pk-yrs)     Types: Cigarettes    Smokeless tobacco: Never   Substance and Sexual Activity    Alcohol use: Yes     Alcohol/week: 9.0 standard drinks of alcohol     Types: 2 Glasses of wine, 2 Cans of beer, 3 Shots of liquor, 2 Drinks containing 0.5 oz of alcohol per week     Comment: twice weekly    Drug use: Never    Sexual activity: Yes     Partners: Female     Review of patient's allergies indicates:  No Known Allergies    The following were reviewed at this visit: active problem list, medication list, allergies, family history, social history, and health maintenance.    Medications:  Current Medications[1]      Medications have been reviewed and reconciled with patient at this visit.  Barriers to medications reviewed with patient.    Adverse reactions to current medications reviewed with patient..    Over the counter medications reviewed and reconciled with patient.  Review of  "Systems   Constitutional:  Negative for chills, diaphoresis, fever and weight loss.   HENT:  Negative for congestion, ear discharge, sinus pain and sore throat.    Eyes:  Negative for photophobia.   Respiratory:  Negative for cough, hemoptysis and shortness of breath.    Cardiovascular:  Negative for chest pain, palpitations, claudication, leg swelling and PND.   Gastrointestinal:  Negative for constipation, diarrhea, nausea and vomiting.   Genitourinary:  Negative for dysuria, frequency and urgency.   Musculoskeletal:  Negative for back pain, joint pain, myalgias and neck pain.   Skin:  Negative for itching and rash.   Neurological:  Negative for dizziness, focal weakness and headaches.   Psychiatric/Behavioral:  Negative for depression. The patient does not have insomnia.            Objective:      Vitals:    07/09/25 1359   BP: 128/73   BP Location: Left arm   Patient Position: Sitting   Pulse: 65   Temp: 97.7 °F (36.5 °C)   SpO2: 95%   Weight: 101.6 kg (224 lb)   Height: 5' 9" (1.753 m)       Physical Exam  Constitutional:       Appearance: Normal appearance.   HENT:      Head: Normocephalic and atraumatic.   Cardiovascular:      Rate and Rhythm: Normal rate and regular rhythm.      Pulses: Normal pulses.   Pulmonary:      Effort: Pulmonary effort is normal.      Breath sounds: Normal breath sounds.   Abdominal:      General: Abdomen is flat. Bowel sounds are normal. There is no distension.      Palpations: Abdomen is soft.      Tenderness: There is no abdominal tenderness.   Musculoskeletal:      Right lower leg: No edema.      Left lower leg: No edema.   Neurological:      General: No focal deficit present.      Mental Status: He is alert and oriented to person, place, and time.               Assessment and Plan:       1. Preop exam for internal medicine  Assessment & Plan:  Due to cardiac history advised patient to contact Cardiology office to obtain clearance from them as well since they will have results " from last Echo and/or stress test  Will need to discontinue aspirin for 7-10 days prior to the procedure  Patient is at low risk for low risk procedure       2. Cough, unspecified type  Assessment & Plan:  Chronic sinus congestion and cough  Will obtain chest xray today  Advised patient to continue taking Mucinex or Coricidin HBP for congestion for 5-7 days and then continue with Claritin and Flonase nasal spray daily.     Orders:  -     X-Ray Chest PA And Lateral; Future; Expected date: 07/09/2025    3. Hypertension, unspecified type  Assessment & Plan:  Continues to have episodes where diastolic blood pressure is too low   Continue lisinopril 10 mg daily  Will decrease amlodipine to 5 mg daily   Continue to monitor BP and keep log      Orders:  -     amLODIPine (NORVASC) 5 MG tablet; Take 1 tablet (5 mg total) by mouth once daily.  Dispense: 90 tablet; Refill: 3    4. Type 2 diabetes mellitus with diabetic polyneuropathy, without long-term current use of insulin  Assessment & Plan:  A1C of 5.9 in 03/2025, controlled  Continue metformin       5. Rheumatoid arthritis without rheumatoid factor, multiple sites  Assessment & Plan:  Stable  Continue methotrexate and Enbrel  Established with Rheumatology       6. Hypothyroidism, unspecified type  Assessment & Plan:  Stable  Continue levothyroxine       7. Atherosclerosis of native coronary artery of native heart with angina pectoris  Assessment & Plan:  S/p stent placement   On aspirin, will need to discontinue aspirin 7-10 days before procedure                Follow up: Follow up as scheduled for diabetes follow up or sooner if needed              [1]   Current Outpatient Medications:     allopurinoL (ZYLOPRIM) 300 MG tablet, Take 1 tablet by mouth once daily., Disp: 90 tablet, Rfl: 1    aspirin 81 mg Cap, Take 1 tablet by mouth 2 (two) times a day., Disp: , Rfl:     blood sugar diagnostic (TRUE METRIX GLUCOSE TEST STRIP) Strp, USE TO CHECK CBG TWICE DAILY, Disp: 200  each, Rfl: 1    diclofenac (VOLTAREN) 75 MG EC tablet, Take 1 tablet by mouth twice daily., Disp: 180 tablet, Rfl: 0    ENBREL SURECLICK 50 mg/mL (1 mL) PnIj, Inject into the skin every 7 days., Disp: , Rfl:     fenofibrate 160 MG Tab, Take 1 tablet by mouth once daily., Disp: 90 tablet, Rfl: 1    folic acid (FOLVITE) 1 MG tablet, Take 1,000 mcg by mouth once daily., Disp: , Rfl:     iron-vit c-b12-folic acid (IRON 100 PLUS) Tab, 1 tablet Daily., Disp: , Rfl:     levothyroxine (SYNTHROID, LEVOTHROID) 175 MCG tablet, Take 1 tablet by mouth before breakfast., Disp: 90 tablet, Rfl: 2    lisinopriL 10 MG tablet, Take 1 tablet by mouth twice daily., Disp: 90 tablet, Rfl: 1    metFORMIN (GLUCOPHAGE) 500 MG tablet, Take 0.5 tablets (250 mg total) by mouth 2 (two) times daily with meals. Take 1/2 tablet bid, Disp: 180 tablet, Rfl: 2    methotrexate 2.5 MG Tab, Take 2.5 mg by mouth every 7 days., Disp: , Rfl:     pyridoxine, vitamin B6, (VITAMIN B-6) 50 MG Tab, Take 50 mg by mouth once daily., Disp: , Rfl:     thiamine (VITAMIN B-1) 100 MG tablet, Take 100 mg by mouth once daily., Disp: , Rfl:     traZODone (DESYREL) 50 MG tablet, Take 1 tablet by mouth every evening., Disp: 90 tablet, Rfl: 3    amLODIPine (NORVASC) 5 MG tablet, Take 1 tablet (5 mg total) by mouth once daily., Disp: 90 tablet, Rfl: 3  No current facility-administered medications for this visit.    Facility-Administered Medications Ordered in Other Visits:     acetaminophen tablet 1,000 mg, 1,000 mg, Oral, On Call Procedure, Edyta Zaidi MD, 1,000 mg at 02/22/24 0818    celecoxib capsule 200 mg, 200 mg, Oral, On Call Procedure, Edyta Zaidi MD, 200 mg at 02/22/24 0819    HYDROcodone-acetaminophen 5-325 mg per tablet 1 tablet, 1 tablet, Oral, Q3H PRN, Edyta Zaidi MD    HYDROmorphone (PF) injection 0.4 mg, 0.4 mg, Intravenous, Q5 Min VIDAN, Edyta Zaidi MD    midazolam (VERSED) 1 mg/mL injection 2 mg, 2 mg, Intravenous, On Call  Procedure, Edyta Zaidi MD, 2 mg at 02/22/24 0840    ondansetron injection 4 mg, 4 mg, Intravenous, Daily PRN, Edyta Zaidi MD    prochlorperazine injection Soln 5 mg, 5 mg, Intravenous, Q30 Min PRN, Edyta Zaidi MD

## 2025-07-14 RX ORDER — AMOXICILLIN AND CLAVULANATE POTASSIUM 875; 125 MG/1; MG/1
1 TABLET, FILM COATED ORAL 2 TIMES DAILY
Qty: 14 TABLET | Refills: 0 | Status: SHIPPED | OUTPATIENT
Start: 2025-07-14

## 2025-07-18 ENCOUNTER — TELEPHONE (OUTPATIENT)
Dept: INTERNAL MEDICINE | Facility: CLINIC | Age: 75
End: 2025-07-18
Payer: MEDICARE

## 2025-07-18 NOTE — TELEPHONE ENCOUNTER
Copied from CRM #[id number]. Topic: Appointments - Amb Referral  >> Jul 18, 2025  8:50 AM Maegan wrote:  .Type:  Patient Returning Call    Who Called:Jazmine with [facility]   Who Left Message for Patient:Jazmine  Does the patient know what this is regarding?:surgery clearance   Would the patient rather a call back or a response via [facility]?   Best Call Back Number:[phone] ask for Jazmine or Karishma  Additional Information: Please call back about surgery clearance that was faxed over for the patient on July 9th   Quality 226: Preventive Care And Screening: Tobacco Use: Screening And Cessation Intervention: Patient screened for tobacco use and is an ex/non-smoker Detail Level: Detailed Quality 130: Documentation Of Current Medications In The Medical Record: Current Medications Documented

## 2025-07-18 NOTE — TELEPHONE ENCOUNTER
Stephanie with [facility] urology stated that she spoke with pt about surgery clearance and he advised her that he will get the surgery clearance from his cardiologist and that our office didn't have anything else to do it was ok. She thank me for returning her call and voiced understanding.

## (undated) DEVICE — SUT MONOCRYL 3-0 PS-2 UND

## (undated) DEVICE — NDL FLTR 5MCRN BLNT TIP 18GX1

## (undated) DEVICE — Device

## (undated) DEVICE — SET SMARTSITE EXT SMALLBORE NF

## (undated) DEVICE — BANDAGE SHEER STRIP 3/4X3IN

## (undated) DEVICE — SUT SUPRAMID 3-0 BB 12X.375IN

## (undated) DEVICE — CUFF ATS 2 PORT SNGL BLDR 18IN

## (undated) DEVICE — NDL SYR 10ML 18X1.5 LL BLUNT

## (undated) DEVICE — GOWN NONREINF SET-IN SLV XL

## (undated) DEVICE — DRAPE MEDIUM SHEET 40X70IN

## (undated) DEVICE — CORD BIPOLAR 12 FOOT

## (undated) DEVICE — APPLICATOR CHLORAPREP ORN 26ML

## (undated) DEVICE — KIT DRAIN WOUND RND SPRNG RESV

## (undated) DEVICE — SUT ETHILON 3-0 PS2 18 BLK

## (undated) DEVICE — CHLORAPREP 10.5 ML APPLICATOR

## (undated) DEVICE — KIT SURGICAL TURNOVER

## (undated) DEVICE — SYR DISP LL 5CC

## (undated) DEVICE — BANDAGE ESMARK LATEX FREE 4INX

## (undated) DEVICE — GLOVE SENSICARE PI GRN 7.5

## (undated) DEVICE — GLOVE SENSICARE PI GRN 6.5

## (undated) DEVICE — DRAPE UTILITY W/ TAPE 20X30IN

## (undated) DEVICE — BANDAGE VELCLOSE ELAS 2INX5YD

## (undated) DEVICE — DRAPE HAND STERILE

## (undated) DEVICE — BANDAGE VELCLOSE ELAS 3INX5YD

## (undated) DEVICE — KIT ANTIFOG W/SPONG & FLUID

## (undated) DEVICE — CANNULA AIRLIFE ETCO2 NSL 7FT

## (undated) DEVICE — POSITIONER HEAD ADULT

## (undated) DEVICE — SYR 10CC LUER LOCK

## (undated) DEVICE — GLOVE SENSICARE PI SURG 6.5

## (undated) DEVICE — NDL SAFETY 25G X 1.5 ECLIPSE

## (undated) DEVICE — SOL NACL IRR 1000ML BTL

## (undated) DEVICE — BLADE SURG STAINLESS STEEL #15

## (undated) DEVICE — NDL QUINCKE S/SU 22GA 5IN

## (undated) DEVICE — SYR 3ML LL 18GA 1.5IN

## (undated) DEVICE — GLOVE PROTEXIS PI CRM 7

## (undated) DEVICE — ELECTRODE PATIENT RETURN DISP

## (undated) DEVICE — CONTRAST ISOVUE M 200 20ML VIL

## (undated) DEVICE — NDL HYPO REG 25G X 1 1/2

## (undated) DEVICE — GLOVE PROTEXIS PI CRM 6.5

## (undated) DEVICE — SYR 3CC LUER LOC

## (undated) DEVICE — DRESSING XEROFORM FOIL PK 1X8

## (undated) DEVICE — SPONGE KERLIX SUPER 6X6.75IN

## (undated) DEVICE — GLOVE PROTEXIS LTX MICRO 6.5

## (undated) DEVICE — NDL QUINCKE S/SU 22GA 7IN

## (undated) DEVICE — SUT 4-0 ETHILON 18 PS-2

## (undated) DEVICE — NDL SPINAL 22GA 3.5 IN QUINCKE